# Patient Record
Sex: FEMALE | Race: WHITE | NOT HISPANIC OR LATINO | Employment: FULL TIME | URBAN - METROPOLITAN AREA
[De-identification: names, ages, dates, MRNs, and addresses within clinical notes are randomized per-mention and may not be internally consistent; named-entity substitution may affect disease eponyms.]

---

## 2017-07-15 ENCOUNTER — OFFICE VISIT (OUTPATIENT)
Dept: URGENT CARE | Age: 47
End: 2017-07-15
Payer: COMMERCIAL

## 2017-07-15 PROCEDURE — 99203 OFFICE O/P NEW LOW 30 MIN: CPT | Performed by: FAMILY MEDICINE

## 2017-07-27 ENCOUNTER — GENERIC CONVERSION - ENCOUNTER (OUTPATIENT)
Dept: OTHER | Facility: OTHER | Age: 47
End: 2017-07-27

## 2017-10-18 ENCOUNTER — ALLSCRIPTS OFFICE VISIT (OUTPATIENT)
Dept: OTHER | Facility: OTHER | Age: 47
End: 2017-10-18

## 2017-10-18 DIAGNOSIS — D64.9 ANEMIA: ICD-10-CM

## 2017-10-18 DIAGNOSIS — G43.019 INTRACTABLE MIGRAINE WITHOUT AURA AND WITHOUT STATUS MIGRAINOSUS: ICD-10-CM

## 2017-10-18 DIAGNOSIS — Z13.220 ENCOUNTER FOR SCREENING FOR LIPOID DISORDERS: ICD-10-CM

## 2017-10-18 DIAGNOSIS — R53.83 OTHER FATIGUE: ICD-10-CM

## 2017-10-18 DIAGNOSIS — H93.13 TINNITUS OF BOTH EARS: ICD-10-CM

## 2017-10-19 ENCOUNTER — TRANSCRIBE ORDERS (OUTPATIENT)
Dept: ADMINISTRATIVE | Facility: HOSPITAL | Age: 47
End: 2017-10-19

## 2017-10-19 DIAGNOSIS — H93.13 TINNITUS OF BOTH EARS: Primary | ICD-10-CM

## 2017-10-20 ENCOUNTER — GENERIC CONVERSION - ENCOUNTER (OUTPATIENT)
Dept: OTHER | Facility: OTHER | Age: 47
End: 2017-10-20

## 2017-10-20 ENCOUNTER — APPOINTMENT (OUTPATIENT)
Dept: LAB | Facility: CLINIC | Age: 47
End: 2017-10-20
Payer: COMMERCIAL

## 2017-10-20 DIAGNOSIS — G43.019 INTRACTABLE MIGRAINE WITHOUT AURA AND WITHOUT STATUS MIGRAINOSUS: ICD-10-CM

## 2017-10-20 DIAGNOSIS — H93.13 TINNITUS OF BOTH EARS: ICD-10-CM

## 2017-10-20 DIAGNOSIS — R53.83 OTHER FATIGUE: ICD-10-CM

## 2017-10-20 DIAGNOSIS — Z13.220 ENCOUNTER FOR SCREENING FOR LIPOID DISORDERS: ICD-10-CM

## 2017-10-20 LAB
ALBUMIN SERPL BCP-MCNC: 3.4 G/DL (ref 3.5–5)
ALP SERPL-CCNC: 67 U/L (ref 46–116)
ALT SERPL W P-5'-P-CCNC: 19 U/L (ref 12–78)
ANION GAP SERPL CALCULATED.3IONS-SCNC: 6 MMOL/L (ref 4–13)
AST SERPL W P-5'-P-CCNC: 11 U/L (ref 5–45)
BASOPHILS # BLD AUTO: 0.02 THOUSANDS/ΜL (ref 0–0.1)
BASOPHILS NFR BLD AUTO: 0 % (ref 0–1)
BILIRUB SERPL-MCNC: 0.27 MG/DL (ref 0.2–1)
BUN SERPL-MCNC: 13 MG/DL (ref 5–25)
CALCIUM SERPL-MCNC: 9.8 MG/DL (ref 8.3–10.1)
CHLORIDE SERPL-SCNC: 106 MMOL/L (ref 100–108)
CHOLEST SERPL-MCNC: 162 MG/DL (ref 50–200)
CO2 SERPL-SCNC: 27 MMOL/L (ref 21–32)
CREAT SERPL-MCNC: 0.66 MG/DL (ref 0.6–1.3)
EOSINOPHIL # BLD AUTO: 0.16 THOUSAND/ΜL (ref 0–0.61)
EOSINOPHIL NFR BLD AUTO: 3 % (ref 0–6)
ERYTHROCYTE [DISTWIDTH] IN BLOOD BY AUTOMATED COUNT: 13.8 % (ref 11.6–15.1)
GFR SERPL CREATININE-BSD FRML MDRD: 105 ML/MIN/1.73SQ M
GLUCOSE P FAST SERPL-MCNC: 96 MG/DL (ref 65–99)
HCT VFR BLD AUTO: 31.9 % (ref 34.8–46.1)
HDLC SERPL-MCNC: 47 MG/DL (ref 40–60)
HGB BLD-MCNC: 9.7 G/DL (ref 11.5–15.4)
LDLC SERPL CALC-MCNC: 77 MG/DL (ref 0–100)
LYMPHOCYTES # BLD AUTO: 1.81 THOUSANDS/ΜL (ref 0.6–4.47)
LYMPHOCYTES NFR BLD AUTO: 37 % (ref 14–44)
MCH RBC QN AUTO: 26.8 PG (ref 26.8–34.3)
MCHC RBC AUTO-ENTMCNC: 30.4 G/DL (ref 31.4–37.4)
MCV RBC AUTO: 88 FL (ref 82–98)
MONOCYTES # BLD AUTO: 0.31 THOUSAND/ΜL (ref 0.17–1.22)
MONOCYTES NFR BLD AUTO: 6 % (ref 4–12)
NEUTROPHILS # BLD AUTO: 2.57 THOUSANDS/ΜL (ref 1.85–7.62)
NEUTS SEG NFR BLD AUTO: 54 % (ref 43–75)
NRBC BLD AUTO-RTO: 0 /100 WBCS
PLATELET # BLD AUTO: 266 THOUSANDS/UL (ref 149–390)
PMV BLD AUTO: 10.5 FL (ref 8.9–12.7)
POTASSIUM SERPL-SCNC: 4.3 MMOL/L (ref 3.5–5.3)
PROT SERPL-MCNC: 7.1 G/DL (ref 6.4–8.2)
RBC # BLD AUTO: 3.62 MILLION/UL (ref 3.81–5.12)
SODIUM SERPL-SCNC: 139 MMOL/L (ref 136–145)
TRIGL SERPL-MCNC: 191 MG/DL
TSH SERPL DL<=0.05 MIU/L-ACNC: 2.48 UIU/ML (ref 0.36–3.74)
WBC # BLD AUTO: 4.88 THOUSAND/UL (ref 4.31–10.16)

## 2017-10-20 PROCEDURE — 36415 COLL VENOUS BLD VENIPUNCTURE: CPT

## 2017-10-20 PROCEDURE — 80061 LIPID PANEL: CPT

## 2017-10-20 PROCEDURE — 85025 COMPLETE CBC W/AUTO DIFF WBC: CPT

## 2017-10-20 PROCEDURE — 80053 COMPREHEN METABOLIC PANEL: CPT

## 2017-10-20 PROCEDURE — 84443 ASSAY THYROID STIM HORMONE: CPT

## 2017-11-30 ENCOUNTER — ALLSCRIPTS OFFICE VISIT (OUTPATIENT)
Dept: OTHER | Facility: OTHER | Age: 47
End: 2017-11-30

## 2017-12-05 NOTE — PROGRESS NOTES
Assessment    1  Acute maxillary sinusitis (461 0) (J01 00)    Plan  Acute maxillary sinusitis    · Azithromycin 250 MG Oral Tablet; TAKE 2 TABLETS ON DAY 1 THEN TAKE 1  TABLET A DAY FOR 4 DAYS    Discussion/Summary  Possible side effects of new medications were reviewed with the patient/guardian today  The treatment plan was reviewed with the patient/guardian  The patient/guardian understands and agrees with the treatment plan      Chief Complaint  PT here for Sore Throat, cough, mucus, body aches and fatigue      History of Present Illness  Cold Symptoms: Calton Ahumada presents with complaints of cold symptoms  Associated symptoms include nasal congestion, runny nose, post nasal drainage, sore throat, dry cough, facial pressure and fever, but no sneezing, no scratchy throat, no hoarseness, no productive cough, no facial pain, no headache, no plugged ear(s), no ear pain, no swollen lymph nodes, no wheezing, no shortness of breath, no fatigue, no weakness, no nausea, no vomiting, no diarrhea and no chills  Review of Systems    Constitutional: fever, but no chills  ENT: as noted in HPI  Cardiovascular: no complaints of slow or fast heart rate, no chest pain, no palpitations, no leg claudication or lower extremity edema  Respiratory: cough, but no shortness of breath and no wheezing  Active Problems    1  Anemia (285 9) (D64 9)   2  Encounter for routine gynecological examination () (Z01 419)   3  Denied: History of self breast exam   4  Migraine without aura, intractable (346 11) (G43 019)   5  Need for lipid screening (7 91) (Z13 220)   6  Screening for HPV (human papillomavirus) ( 81) (Z11 51)   7  Tinnitus of both ears (388 30) (H93 13)   8  Uterine prolapse (618 1) (N81 4)   9  Visit for screening mammogram (6 12) (Z12 31)    Past Medical History    1  History of  3   2  Headache (784 0) (R51)   3   Denied: History of self breast exam  Active Problems And Past Medical History Reviewed: The active problems and past medical history were reviewed and updated today  Family History  Father    1  Family history of Acute Renal Failure   2  Family history of Type 2 Diabetes Mellitus  Family History Reviewed: The family history was reviewed and updated today  Social History    · Denied: History of Alcohol Use (History)   · Birth Control Not Practiced   · Caffeine Use   · Marital History - Single   · Never A Smoker   · No drug use   · Three children   · Uses Safety Equipment - Seatbelts  The social history was reviewed and updated today  The social history was reviewed and is unchanged  Surgical History    1  History of  Section   2  History of Eye Surgery  Surgical History Reviewed: The surgical history was reviewed and updated today  Current Meds   1  ALPRAZolam 0 25 MG Oral Tablet; Therapy: 31YBS3315 to Recorded   2  Ibuprofen 800 MG Oral Tablet; Therapy: 2016 to Recorded   3  Magnesium Oxide 500 MG Oral Tablet; take 1 tablet every twelve hours; Therapy: 97MOI1373 to (Evaluate:2017)  Requested for: 07AXN9938; Last   Rx:2017 Ordered    The medication list was reviewed and updated today  Allergies    1  No Known Drug Allergies    2  Seasonal    Vitals   Recorded: 88USW1914 02:27PM   Temperature 100 1 F   Heart Rate 104   Respiration 16   Systolic 259   Diastolic 72   Height 5 ft 4 in   Weight 154 lb 4 oz   BMI Calculated 26 48   BSA Calculated 1 75     Physical Exam    Constitutional   General appearance: No acute distress, well appearing and well nourished  Eyes   Conjunctiva and lids: No swelling, erythema or discharge  Pupils and irises: Equal, round and reactive to light  Ears, Nose, Mouth, and Throat   External inspection of ears and nose: Normal     Otoscopic examination: Tympanic membranes translucent with normal light reflex  Canals patent without erythema      Nasal mucosa, septum, and turbinates: Abnormal  There was clear rhinorrhea from both nares  The bilateral nasal mucosa was red  Pulmonary   Respiratory effort: No increased work of breathing or signs of respiratory distress  Auscultation of lungs: Clear to auscultation  Cardiovascular   Palpation of heart: Normal PMI, no thrills  Auscultation of heart: Normal rate and rhythm, normal S1 and S2, without murmurs  Results/Data  PHQ-2 Adult Depression Screening 12QAE2410 02:29PM User, Ahs     Test Name Result Flag Reference   PHQ-2 Adult Depression Score 0     Over the last two weeks, how often have you been bothered by any of the following problems?   Little interest or pleasure in doing things: Not at all - 0  Feeling down, depressed, or hopeless: Not at all - 0   PHQ-2 Adult Depression Screening Negative         Future Appointments    Date/Time Provider Specialty Site   01/18/2018 04:45 PM Mayte De La O MD [address]   Electronically signed by : Raiford Brittle, MD; Nov 30 2017  2:43PM EST                       (Author)

## 2018-01-09 ENCOUNTER — GENERIC CONVERSION - ENCOUNTER (OUTPATIENT)
Dept: OTHER | Facility: OTHER | Age: 48
End: 2018-01-09

## 2018-01-12 NOTE — RESULT NOTES
Discussion/Summary   blood work showed mild anemia at 9 7 ( normal 11-15)   I want to order more blood work to find out the underlying cause of her anemia  blood work order in chart  I want her start iron and Vitamin C supplements     high triglycerides level- watch sweets and fatty food   normal liver and kidney function    - Dr Franco Love      Verified Results  (1) TSH WITH FT4 REFLEX 20Oct2017 08:34AM Leilani De La O    Order Number: [id number]     Test Name Result Flag Reference   TSH 2 480 uIU/mL  0 358-3 740   Patients undergoing fluorescein dye angiography may retain small amounts of fluorescein in the body for 48-72 hours post procedure  Samples containing fluorescein can produce falsely depressed TSH values  If the patient had this procedure,a specimen should be resubmitted post fluorescein clearance  The recommended reference ranges for TSH during pregnancy are as follows:  First trimester 0 1 to 2 5 uIU/mL  Second trimester  0 2 to 3 0 uIU/mL  Third trimester 0 3 to 3 0 uIU/m     (1) COMPREHENSIVE METABOLIC PANEL 09NKX6630 27:27IX [facility] Order Number: [id number]     Test Name Result Flag Reference   SODIUM 139 mmol/L  136-145   POTASSIUM 4 3 mmol/L  3 5-5 3   CHLORIDE 106 mmol/L  100-108   CARBON DIOXIDE 27 mmol/L  21-32   ANION GAP (CALC) 6 mmol/L  4-13   BLOOD UREA NITROGEN 13 mg/dL  5-25   CREATININE 0 66 mg/dL  0 60-1 30   Standardized to IDMS reference method   CALCIUM 9 8 mg/dL  8 3-10 1   BILI, TOTAL 0 27 mg/dL  0 20-1 00   ALK PHOSPHATAS 67 U/L     ALT (SGPT) 19 U/L  12-78   Specimen collection should occur prior to Sulfasalazine and/or Sulfapyridine administration due to the potential for falsely depressed results  AST(SGOT) 11 U/L  5-45   Specimen collection should occur prior to Sulfasalazine administration due to the potential for falsely depressed results     ALBUMIN 3 4 g/dL L 3 5-5 0   TOTAL PROTEIN 7 1 g/dL  6 4-8 2   eGFR 105 ml/min/1 73sq m National Kidney Disease Education Program recommendations are as follows:  GFR calculation is accurate only with a steady state creatinine  Chronic Kidney disease less than 60 ml/min/1 73 sq  meters  Kidney failure less than 15 ml/min/1 73 sq  meters  GLUCOSE FASTING 96 mg/dL  65-99   Specimen collection should occur prior to Sulfasalazine administration due to the potential for falsely depressed results  Specimen collection should occur prior to Sulfapyridine administration due to the potential for falsely elevated results  (1) CBC/PLT/DIFF 20Oct2017 08:34AM Masha De La O Order Number: [id number]     Test Name Result Flag Reference   WBC COUNT 4 88 Thousand/uL  4 31-10 16   RBC COUNT 3 62 Million/uL L 3 81-5 12   HEMOGLOBIN 9 7 g/dL L 11 5-15 4   HEMATOCRIT 31 9 % L 34 8-46  1   MCV 88 fL  82-98   MCH 26 8 pg  26 8-34 3   MCHC 30 4 g/dL L 31 4-37 4   RDW 13 8 %  11 6-15 1   MPV 10 5 fL  8 9-12 7   PLATELET COUNT 584 Thousands/uL  149-390   nRBC AUTOMATED 0 /100 WBCs     NEUTROPHILS RELATIVE PERCENT 54 %  43-75   LYMPHOCYTES RELATIVE PERCENT 37 %  14-44   MONOCYTES RELATIVE PERCENT 6 %  4-12   EOSINOPHILS RELATIVE PERCENT 3 %  0-6   BASOPHILS RELATIVE PERCENT 0 %  0-1   NEUTROPHILS ABSOLUTE COUNT 2 57 Thousands/? ??L  1 85-7 62   LYMPHOCYTES ABSOLUTE COUNT 1 81 Thousands/? ??L  0 60-4 47   MONOCYTES ABSOLUTE COUNT 0 31 Thousand/? ??L  0 17-1 22   EOSINOPHILS ABSOLUTE COUNT 0 16 Thousand/? ??L  0 00-0 61   BASOPHILS ABSOLUTE COUNT 0 02 Thousands/? ??L  0 00-0 10     (1) LIPID PANEL, FASTING 12UNF1883 08:34AM Masha De La O Order Number: [id number]     Test Name Result Flag Reference   CHOLESTEROL 162 mg/dL     HDL,DIRECT 47 mg/dL  40-60   Specimen collection should occur prior to Metamizole administration due to the potential for falsley depressed results     LDL CHOLESTEROL CALCULATED 77 mg/dL  0-100   Triglyceride:        Normal <150 mg/dl   Borderline High 150-199 mg/dl   High 200-499 mg/dl   Very High >499 mg/dl      Cholesterol:       Desirable <200 mg/dl    Borderline High 200-239 mg/dl    High >239 mg/dl      HDL Cholesterol:       High>59 mg/dL    Low <41 mg/dL      This screening LDL is a calculated result  It does not have the accuracy of the Direct Measured LDL in the monitoring of patients with hyperlipidemia and/or statin therapy  Direct Measure LDL (DTY008) must be ordered separately in these patients  TRIGLYCERIDES 191 mg/dL H <=150   Specimen collection should occur prior to N-Acetylcysteine or Metamizole administration due to the potential for falsely depressed results  Plan  Anemia    · (1) FERRITIN; Status:Active; Requested HVK:91CQT5131;    · (1) IRON; Status:Active; Requested DXY:25BRX0965;    · (1) METHYLMALONIC ACID,BLOOD; Status:Active; Requested for:20Oct2017;    · (1) VITAMIN B12; Status:Active;  Requested SAP:[id number];     Signatures   Electronically signed by : Kuldip Mueller MD; Oct 20 2017  2:49PM EST                       (Author)

## 2018-01-12 NOTE — PROGRESS NOTES
Assessment    1  Encounter for preventive health examination (V70 0) (Z00 00)   2  Tinnitus of both ears (388 30) (H93 13)   3  Migraine without aura, intractable (346 11) (G43 019)   4  Fatigue (780 79) (R53 83)   5  Need for lipid screening (V77 91) (Z13 220)    Plan  Fatigue    · (1) CBC/PLT/DIFF; Status:Active; Requested for:18Oct2017; Health Maintenance    · Always use a seat belt and shoulder strap when riding or driving a motor vehicle ;  Status:Complete;   Done: 51KWI4014   · Begin or continue regular aerobic exercise  Gradually work up to at least 3 sessions of 30  minutes of exercise a week ; Status:Complete;   Done: 02ZCR4652   · Brush your teeth 3 times a day and floss at least once a day ; Status:Complete;   Done:  73XMN4453   · Drink plenty of fluids ; Status:Complete;   Done: 03OXK5955   · Eat a low fat and low cholesterol diet ; Status:Complete;   Done: 57REB9663   · Eat a normal well-balanced diet ; Status:Complete;   Done: 32WJP8627   · Eat foods that are high in calcium ; Status:Complete;   Done: 62GFF0963   · Some eating tips that can help you lose weight ; Status:Complete;   Done: 79ZDB2871   · Use a sun block product with an SPF of 15 or more ; Status:Complete;   Done:  29KCL6648   · Vitamins can help you get daily requirements that your diet may not be giving you ;  Status:Complete;   Done: 04FXC0588   · We recommend that you bring your body mass index down to 26 ; Status:Complete;    Done: 09NJV8490   · Follow-up visit in 1 year Evaluation and Treatment  Follow-up  Status: Complete  Done:  70WSS5726  Migraine without aura, intractable    · Magnesium Oxide 500 MG Oral Tablet; take 1 tablet every twelve hours   · Topiramate 50 MG Oral Tablet (Topamax); 1/2 tab every night for 1 week then 1 tab  QHS  Migraine without aura, intractable, Tinnitus of both ears    · (1) COMPREHENSIVE METABOLIC PANEL; Status:Active; Requested for:18Oct2017;    · (1) TSH WITH FT4 REFLEX; Status:Active;  Requested AXV:27KID1122;    · * MRI BRAIN W WO CONTRAST; Status:Need Information - Financial Authorization; Requested for:18Oct2017;   Need for lipid screening    · (1) LIPID PANEL, FASTING; Status:Active; Requested for:18Oct2017;     Discussion/Summary  health maintenance visit Currently, she eats an adequate diet  the risks and benefits of cervical cancer screening were discussed Breast cancer screening: the risks and benefits of breast cancer screening were discussed and breast cancer screening is not indicated  Colorectal cancer screening: the risks and benefits of colorectal cancer screening were discussed and colorectal cancer screening is not indicated  Screening lab work includes hemoglobin, glucose and lipid profile  The patient declines immunizations  She was advised to be evaluated by an ophthalmologist and a dentist  Advice and education were given regarding nutrition, contraception, self skin examination, helmet use and seat belt use  Chief Complaint  Pt here for New PCP and Physical      History of Present Illness  HM, Adult Female: The patient is being seen for a health maintenance evaluation  The last health maintenance visit was 1 year(s) ago  General Health: The patient's health since the last visit is described as good  She has regular dental visits  She denies vision problems  She denies hearing loss  Immunizations status: not up to date  Lifestyle:  She consumes a diverse and healthy diet  She does not have any weight concerns  She exercises regularly  She does not use tobacco  She denies alcohol use  She denies drug use  Reproductive health:  she reports normal menses  she uses no contraception  she is not sexually active  pregnancy history: G 3P 3, 3  Screening: cancer screening reviewed and updated  metabolic screening reviewed and updated  risk screening reviewed and updated     HPI: tinnitus on both ears for 1 year  seen by ENT that time and all work up were normal   worsening headaches and tinnitus for the last few months   Never had MRI of the brain for evaluation   Ibuprofen is not helping with her headaches anymore       Review of Systems    Constitutional: No fever, no chills, feels well, no tiredness, no recent weight gain or weight loss  Eyes: No complaints of eye pain, no red eyes, no eyesight problems, no discharge, no dry eyes, no itching of eyes  ENT: no complaints of earache, no loss of hearing, no nose bleeds, no nasal discharge, no sore throat, no hoarseness  Cardiovascular: No complaints of slow heart rate, no fast heart rate, no chest pain, no palpitations, no leg claudication, no lower extremity edema  Respiratory: No complaints of shortness of breath, no wheezing, no cough, no SOB on exertion, no orthopnea, no PND  Gastrointestinal: No complaints of abdominal pain, no constipation, no nausea or vomiting, no diarrhea, no bloody stools  Genitourinary: No complaints of dysuria, no incontinence, no pelvic pain, no dysmenorrhea, no vaginal discharge or bleeding  Musculoskeletal: No complaints of arthralgias, no myalgias, no joint swelling or stiffness, no limb pain or swelling  Integumentary: No complaints of skin rash or lesions, no itching, no skin wounds, no breast pain or lump  Neurological: headache, but as noted in HPI, no numbness, no confusion and no convulsions  Psychiatric: Not suicidal, no sleep disturbance, no anxiety or depression, no change in personality, no emotional problems  Endocrine: No complaints of proptosis, no hot flashes, no muscle weakness, no deepening of the voice, no feelings of weakness  Hematologic/Lymphatic: No complaints of swollen glands, no swollen glands in the neck, does not bleed easily, does not bruise easily  Active Problems    1  Encounter for routine gynecological examination (V72 31) (Z01 419)   2  Denied: History of self breast exam   3  Screening for HPV (human papillomavirus) (Y83 81) (Z11 51)   4  Uterine prolapse (618 1) (N81 4)   5  Visit for screening mammogram (V76 12) (Z12 31)    Past Medical History    · History of  3   · Headache (784 0) (R51)   · Denied: History of self breast exam    Surgical History    · History of  Section   · History of Eye Surgery    Family History  Father    · Family history of Acute Renal Failure   · Family history of Type 2 Diabetes Mellitus    Social History    · Denied: History of Alcohol Use (History)   · Birth Control Not Practiced   · Caffeine Use   · Marital History - Single   · Never A Smoker   · No drug use   · Three children   · Uses Safety Equipment - Seatbelts    Current Meds   1  ALPRAZolam 0 25 MG Oral Tablet; Therapy: 03WYV4949 to Recorded   2  Ibuprofen 800 MG Oral Tablet; Therapy: 87Hfl1032 to Recorded    Allergies    1  No Known Drug Allergies    Vitals   Recorded: 21DVJ2197 03:52PM   Heart Rate 100   Respiration 16   Systolic 156   Diastolic 72   Height 5 ft 4 65 in   Weight 149 lb 4 oz   BMI Calculated 25 11   BSA Calculated 1 74     Physical Exam    Constitutional   General appearance: No acute distress, well appearing and well nourished  Head and Face   Head and face: Normal     Palpation of the face and sinuses: No sinus tenderness  Eyes   Conjunctiva and lids: No swelling, erythema or discharge  Pupils and irises: Equal, round, reactive to light  Ophthalmoscopic examination: Normal fundi and optic discs  Ears, Nose, Mouth, and Throat   External inspection of ears and nose: Normal     Otoscopic examination: Tympanic membranes translucent with normal light reflex  Canals patent without erythema  Hearing: Normal     Nasal mucosa, septum, and turbinates: Normal without edema or erythema  Lips, teeth, and gums: Normal, good dentition  Oropharynx: Normal with no erythema, edema, exudate or lesions  Neck   Neck: Supple, symmetric, trachea midline, no masses  Thyroid: Normal, no thyromegaly      Pulmonary Respiratory effort: No increased work of breathing or signs of respiratory distress  Auscultation of lungs: Clear to auscultation  Cardiovascular   Palpation of heart: Normal PMI, no thrills  Auscultation of heart: Normal rate and rhythm, normal S1 and S2, no murmurs  Carotid pulses: 2+ bilaterally  Examination of extremities for edema and/or varicosities: Normal     Chest   Chest: Normal     Abdomen   Abdomen: Non-tender, no masses  Liver and spleen: No hepatomegaly or splenomegaly  Examination for hernias: No hernia appreciated  Lymphatic   Palpation of lymph nodes in neck: No lymphadenopathy  Palpation of lymph nodes in axillae: No lymphadenopathy  Palpation of lymph nodes in groin: No lymphadenopathy  Musculoskeletal   Gait and station: Normal     Digits and nails: Normal without clubbing or cyanosis  Joints, bones, and muscles: Normal     Range of motion: Normal     Stability: Normal     Muscle strength/tone: Normal     Skin   Skin and subcutaneous tissue: Normal without rashes or lesions  Palpation of skin and subcutaneous tissue: Normal turgor  Neurologic   Cranial nerves: Cranial nerves II-XII intact  Cortical function: Normal mental status  Reflexes: 2+ and symmetric  Sensation: No sensory loss  Coordination: Normal finger to nose and heel to shin  Psychiatric   Judgment and insight: Normal     Orientation to person, place, and time: Normal     Recent and remote memory: Intact      Mood and affect: Normal        Future Appointments    Date/Time Provider Specialty Site   01/18/2018 04:45 PM Selena De La O MD [place] [address]   Electronically signed by : Lin Harvey MD; Oct 18 2017  5:03PM EST                       (Author)

## 2018-01-13 VITALS
HEART RATE: 100 BPM | HEIGHT: 65 IN | SYSTOLIC BLOOD PRESSURE: 118 MMHG | WEIGHT: 149.25 LBS | DIASTOLIC BLOOD PRESSURE: 72 MMHG | BODY MASS INDEX: 24.87 KG/M2 | RESPIRATION RATE: 16 BRPM

## 2018-01-14 VITALS
RESPIRATION RATE: 16 BRPM | WEIGHT: 154.25 LBS | HEART RATE: 104 BPM | BODY MASS INDEX: 26.34 KG/M2 | SYSTOLIC BLOOD PRESSURE: 118 MMHG | DIASTOLIC BLOOD PRESSURE: 72 MMHG | TEMPERATURE: 100.1 F | HEIGHT: 64 IN

## 2018-01-14 NOTE — MISCELLANEOUS
Message   Date: 27 Jul 2017 11:09 AM EST, Recorded By: Jose C Russ For: [place]: Nigel Runner, [place]   Phone: [phone] [facility]), [phone] (Work)   Reason: Medical Complaint   pt is having breast pain    pt will schedule apt for evaluation           Active Problems    1  Encounter for routine gynecological examination (V72 31) (Z01 419)   2  Headache (784 0) (R51)   3  Denied: History of self breast exam   4  Lightheadedness (780 4) (R42)   5  Screening for HPV (human papillomavirus) (V73 81) (Z11 51)   6  Tinnitus of left ear (388 30) (H93 12)   7  Uterine prolapse (618 1) (N81 4)   8  Visit for screening mammogram (V76 12) (Z12 31)    Current Meds   1  ALPRAZolam 0 25 MG Oral Tablet; Therapy: 88ZWQ5352 to Recorded   2  Ibuprofen 800 MG Oral Tablet; Therapy: 53Yrj9267 to Recorded    Allergies    1   No Known Drug Allergies    Signatures   Electronically signed by : Tommie Rodriguez, ; Jul 27 2017 11:10AM EST                       (Author)

## 2018-01-18 ENCOUNTER — GENERIC CONVERSION - ENCOUNTER (OUTPATIENT)
Dept: OTHER | Facility: OTHER | Age: 48
End: 2018-01-18

## 2018-01-18 DIAGNOSIS — D64.9 ANEMIA: ICD-10-CM

## 2018-01-24 VITALS
SYSTOLIC BLOOD PRESSURE: 136 MMHG | HEIGHT: 64 IN | HEART RATE: 72 BPM | DIASTOLIC BLOOD PRESSURE: 72 MMHG | WEIGHT: 153.13 LBS | BODY MASS INDEX: 26.14 KG/M2 | RESPIRATION RATE: 16 BRPM

## 2018-01-24 VITALS
WEIGHT: 152 LBS | RESPIRATION RATE: 16 BRPM | SYSTOLIC BLOOD PRESSURE: 142 MMHG | HEART RATE: 84 BPM | BODY MASS INDEX: 25.95 KG/M2 | DIASTOLIC BLOOD PRESSURE: 74 MMHG | TEMPERATURE: 97.9 F | HEIGHT: 64 IN

## 2018-03-19 ENCOUNTER — OFFICE VISIT (OUTPATIENT)
Dept: FAMILY MEDICINE CLINIC | Facility: CLINIC | Age: 48
End: 2018-03-19
Payer: COMMERCIAL

## 2018-03-19 VITALS
SYSTOLIC BLOOD PRESSURE: 112 MMHG | WEIGHT: 156.4 LBS | DIASTOLIC BLOOD PRESSURE: 72 MMHG | HEART RATE: 76 BPM | RESPIRATION RATE: 16 BRPM | BODY MASS INDEX: 26.85 KG/M2

## 2018-03-19 DIAGNOSIS — G43.019 MIGRAINE WITHOUT AURA, INTRACTABLE: Primary | ICD-10-CM

## 2018-03-19 DIAGNOSIS — H93.13 TINNITUS OF BOTH EARS: ICD-10-CM

## 2018-03-19 DIAGNOSIS — F51.01 PRIMARY INSOMNIA: ICD-10-CM

## 2018-03-19 PROCEDURE — 99214 OFFICE O/P EST MOD 30 MIN: CPT | Performed by: FAMILY MEDICINE

## 2018-03-19 RX ORDER — THIAMINE HCL 100 MG
1 TABLET ORAL
COMMUNITY
Start: 2017-10-18 | End: 2018-09-20

## 2018-03-19 RX ORDER — ALPRAZOLAM 0.25 MG/1
0.25 TABLET ORAL
Qty: 30 TABLET | Refills: 0 | Status: SHIPPED | OUTPATIENT
Start: 2018-03-19 | End: 2018-09-10 | Stop reason: SDUPTHER

## 2018-03-19 RX ORDER — RIZATRIPTAN BENZOATE 10 MG/1
10 TABLET, ORALLY DISINTEGRATING ORAL ONCE AS NEEDED
Qty: 9 TABLET | Refills: 0 | Status: SHIPPED | OUTPATIENT
Start: 2018-03-19 | End: 2018-05-30 | Stop reason: SDUPTHER

## 2018-03-19 RX ORDER — ALPRAZOLAM 0.25 MG/1
TABLET ORAL
COMMUNITY
Start: 2016-03-30 | End: 2018-03-19 | Stop reason: SDUPTHER

## 2018-03-19 NOTE — PROGRESS NOTES
Assessment/Plan:         Diagnoses and all orders for this visit:    Migraine without aura, intractable  -     rizatriptan (MAXALT-MLT) 10 MG disintegrating tablet; Take 1 tablet (10 mg total) by mouth once as needed for migraine for up to 1 dose May repeat in 2 hours if needed    Primary insomnia  -     ALPRAZolam (XANAX) 0 25 mg tablet; Take 1 tablet (0 25 mg total) by mouth daily at bedtime as needed for anxiety    Tinnitus of both ears  -     Ambulatory Referral to Otolaryngology    Other orders  -     Discontinue: ALPRAZolam (XANAX) 0 25 mg tablet; Take by mouth  -     Magnesium 500 MG TABS; Take 1 tablet by mouth      restart her magnesium daily  Spent 25 minutes with the patient and more than 50% was spent counseling   Subjective:      Patient ID: Tacho Styles is a 51 y o  female  Migraine    This is a new problem  The current episode started in the past 7 days  The problem has been waxing and waning  The pain is located in the bilateral region  The pain quality is similar to prior headaches  The quality of the pain is described as aching  The pain is at a severity of 3/10  The pain is mild  Associated symptoms include insomnia, phonophobia, photophobia and tinnitus  Pertinent negatives include no abdominal pain, abnormal behavior, anorexia, back pain, blurred vision, coughing, dizziness, drainage, ear pain, eye pain, eye redness, eye watering, facial sweating, fever, hearing loss, loss of balance, muscle aches, nausea, neck pain, numbness, rhinorrhea, scalp tenderness, seizures, sinus pressure, sore throat, swollen glands, tingling, visual change, vomiting, weakness or weight loss  The symptoms are aggravated by fatigue and noise  She has tried cold packs and Excedrin for the symptoms  The treatment provided mild relief  Her past medical history is significant for migraine headaches   There is no history of cancer, cluster headaches, hypertension, immunosuppression, migraines in the family, obesity, pseudotumor cerebri, recent head traumas, sinus disease or TMJ  Still having ringing in both ears on and off and hasn't seen an ENT yet    Trouble falling asleep at night due to stress  No recent stressors but her minds wont shut off at night  No suicidal or homicidal ideations      The following portions of the patient's history were reviewed and updated as appropriate: allergies, current medications, past family history, past medical history, past social history, past surgical history and problem list     Review of Systems   Constitutional: Negative for fever and weight loss  HENT: Positive for tinnitus  Negative for ear pain, hearing loss, rhinorrhea, sinus pressure and sore throat  Eyes: Positive for photophobia  Negative for blurred vision, pain and redness  Respiratory: Negative for cough  Gastrointestinal: Negative for abdominal pain, anorexia, nausea and vomiting  Musculoskeletal: Negative for back pain and neck pain  Neurological: Negative for dizziness, tingling, seizures, weakness, numbness and loss of balance  Psychiatric/Behavioral: The patient has insomnia  No past medical history on file  Past Surgical History:   Procedure Laterality Date     SECTION      EYE SURGERY       Social History     Social History    Marital status: Single     Spouse name: N/A    Number of children: 3    Years of education: N/A     Occupational History    Not on file       Social History Main Topics    Smoking status: Never Smoker    Smokeless tobacco: Never Used    Alcohol use No    Drug use: No    Sexual activity: Not on file      Comment: birth control not practiced     Other Topics Concern    Not on file     Social History Narrative    Caffeine use    Uses safety equipment seatbelts     Allergies   Allergen Reactions    Seasonal Ic  [Cholestatin]          Family History   Problem Relation Age of Onset    Kidney failure Father      acute per allscripts    Diabetes type II Father            Current Outpatient Prescriptions:     ALPRAZolam (XANAX) 0 25 mg tablet, Take 1 tablet (0 25 mg total) by mouth daily at bedtime as needed for anxiety, Disp: 30 tablet, Rfl: 0    Magnesium 500 MG TABS, Take 1 tablet by mouth, Disp: , Rfl:     rizatriptan (MAXALT-MLT) 10 MG disintegrating tablet, Take 1 tablet (10 mg total) by mouth once as needed for migraine for up to 1 dose May repeat in 2 hours if needed, Disp: 9 tablet, Rfl: 0    Objective:      /72   Pulse 76   Resp 16   Wt 70 9 kg (156 lb 6 4 oz)   BMI 26 85 kg/m²        Physical Exam   Constitutional: She is oriented to person, place, and time  She appears well-developed and well-nourished  HENT:   Head: Normocephalic  Eyes: Conjunctivae are normal  Pupils are equal, round, and reactive to light  Neck: Normal range of motion  Neck supple  Cardiovascular: Normal rate and regular rhythm  Pulmonary/Chest: Effort normal and breath sounds normal    Musculoskeletal: Normal range of motion  Neurological: She is alert and oriented to person, place, and time  Skin: Skin is warm  Psychiatric: She has a normal mood and affect   Her behavior is normal

## 2018-05-09 ENCOUNTER — OFFICE VISIT (OUTPATIENT)
Dept: FAMILY MEDICINE CLINIC | Facility: CLINIC | Age: 48
End: 2018-05-09
Payer: COMMERCIAL

## 2018-05-09 VITALS
WEIGHT: 155.8 LBS | BODY MASS INDEX: 26.74 KG/M2 | DIASTOLIC BLOOD PRESSURE: 64 MMHG | RESPIRATION RATE: 18 BRPM | HEART RATE: 64 BPM | OXYGEN SATURATION: 97 % | SYSTOLIC BLOOD PRESSURE: 108 MMHG

## 2018-05-09 DIAGNOSIS — R10.11 RIGHT UPPER QUADRANT ABDOMINAL PAIN: Primary | ICD-10-CM

## 2018-05-09 DIAGNOSIS — K59.04 CHRONIC IDIOPATHIC CONSTIPATION: ICD-10-CM

## 2018-05-09 PROCEDURE — 99213 OFFICE O/P EST LOW 20 MIN: CPT | Performed by: FAMILY MEDICINE

## 2018-05-09 RX ORDER — POLYETHYLENE GLYCOL 3350 17 G/17G
17 POWDER, FOR SOLUTION ORAL DAILY
Qty: 14 EACH | Refills: 0 | Status: SHIPPED | OUTPATIENT
Start: 2018-05-09 | End: 2018-09-20

## 2018-05-09 NOTE — PROGRESS NOTES
Assessment/Plan:         Diagnoses and all orders for this visit:    Right upper quadrant abdominal pain  -     US abdomen complete; Future    Chronic idiopathic constipation  -     polyethylene glycol (MIRALAX) 17 g packet; Take 17 g by mouth daily     take probiotics daily to help bloating  To r/o gallbladder disease        Subjective:      Patient ID: Sondra Pagan is a 51 y o  female  Abdominal Pain   This is a recurrent problem  The current episode started more than 1 month ago  The onset quality is gradual  The problem occurs intermittently  The problem has been waxing and waning  The pain is located in the RUQ  The pain is at a severity of 2/10  The pain is mild  The quality of the pain is dull  The abdominal pain does not radiate  Associated symptoms include constipation and flatus  Pertinent negatives include no anorexia, arthralgias, belching, diarrhea, dysuria, fever, frequency, headaches, hematochezia, hematuria, melena, myalgias, nausea, vomiting or weight loss  Nothing aggravates the pain  The pain is relieved by nothing  She has tried nothing for the symptoms  The treatment provided no relief  There is no history of abdominal surgery, colon cancer, Crohn's disease, gallstones, GERD, irritable bowel syndrome, pancreatitis, PUD or ulcerative colitis  The following portions of the patient's history were reviewed and updated as appropriate: allergies, current medications, past family history, past medical history, past social history, past surgical history and problem list     Review of Systems   Constitutional: Negative for fever and weight loss  Gastrointestinal: Positive for abdominal pain, constipation and flatus  Negative for anorexia, diarrhea, hematochezia, melena, nausea and vomiting  Genitourinary: Negative for dysuria, frequency and hematuria  Musculoskeletal: Negative for arthralgias and myalgias  Neurological: Negative for headaches               No past medical history on file   Past Surgical History:   Procedure Laterality Date     SECTION      EYE SURGERY       Social History     Social History    Marital status: Single     Spouse name: N/A    Number of children: 3    Years of education: N/A     Occupational History    Not on file  Social History Main Topics    Smoking status: Never Smoker    Smokeless tobacco: Never Used    Alcohol use No    Drug use: No    Sexual activity: Not on file      Comment: birth control not practiced     Other Topics Concern    Not on file     Social History Narrative    Caffeine use    Uses safety equipment seatbelts     Allergies   Allergen Reactions    Seasonal Ic  [Cholestatin]          Family History   Problem Relation Age of Onset    Kidney failure Father      acute per allscripts    Diabetes type II Father            Current Outpatient Prescriptions:     ALPRAZolam (XANAX) 0 25 mg tablet, Take 1 tablet (0 25 mg total) by mouth daily at bedtime as needed for anxiety, Disp: 30 tablet, Rfl: 0    Magnesium 500 MG TABS, Take 1 tablet by mouth, Disp: , Rfl:     polyethylene glycol (MIRALAX) 17 g packet, Take 17 g by mouth daily, Disp: 14 each, Rfl: 0    rizatriptan (MAXALT-MLT) 10 MG disintegrating tablet, Take 1 tablet (10 mg total) by mouth once as needed for migraine for up to 1 dose May repeat in 2 hours if needed, Disp: 9 tablet, Rfl: 0      Objective:      /64   Pulse 64   Resp 18   Wt 70 7 kg (155 lb 12 8 oz)   SpO2 97%   Breastfeeding? No   BMI 26 74 kg/m²          Physical Exam   Constitutional: She appears well-developed and well-nourished  HENT:   Head: Normocephalic and atraumatic  Eyes: EOM are normal  Pupils are equal, round, and reactive to light  Cardiovascular: Normal rate and regular rhythm  Pulmonary/Chest: Effort normal and breath sounds normal    Abdominal: Bowel sounds are normal  She exhibits no distension  There is tenderness

## 2018-05-16 ENCOUNTER — HOSPITAL ENCOUNTER (OUTPATIENT)
Dept: ULTRASOUND IMAGING | Facility: HOSPITAL | Age: 48
Discharge: HOME | End: 2018-05-16
Payer: COMMERCIAL

## 2018-05-16 DIAGNOSIS — K86.9 LESION OF PANCREAS: ICD-10-CM

## 2018-05-16 DIAGNOSIS — R10.13 EPIGASTRIC PAIN: Primary | ICD-10-CM

## 2018-05-16 DIAGNOSIS — R10.11 RIGHT UPPER QUADRANT ABDOMINAL PAIN: ICD-10-CM

## 2018-05-16 PROCEDURE — 76700 US EXAM ABDOM COMPLETE: CPT

## 2018-05-25 ENCOUNTER — APPOINTMENT (OUTPATIENT)
Dept: LAB | Facility: CLINIC | Age: 48
End: 2018-05-25
Payer: COMMERCIAL

## 2018-05-25 LAB
ALBUMIN SERPL BCP-MCNC: 3.5 G/DL (ref 3.5–5)
ALP SERPL-CCNC: 64 U/L (ref 46–116)
ALT SERPL W P-5'-P-CCNC: 20 U/L (ref 12–78)
AMYLASE SERPL-CCNC: 57 IU/L (ref 25–115)
ANION GAP SERPL CALCULATED.3IONS-SCNC: 6 MMOL/L (ref 4–13)
AST SERPL W P-5'-P-CCNC: 11 U/L (ref 5–45)
BASOPHILS # BLD AUTO: 0.02 THOUSANDS/ΜL (ref 0–0.1)
BASOPHILS NFR BLD AUTO: 0 % (ref 0–1)
BILIRUB SERPL-MCNC: 0.19 MG/DL (ref 0.2–1)
BUN SERPL-MCNC: 15 MG/DL (ref 5–25)
CALCIUM SERPL-MCNC: 10 MG/DL (ref 8.3–10.1)
CHLORIDE SERPL-SCNC: 110 MMOL/L (ref 100–108)
CO2 SERPL-SCNC: 26 MMOL/L (ref 21–32)
CREAT SERPL-MCNC: 0.65 MG/DL (ref 0.6–1.3)
EOSINOPHIL # BLD AUTO: 0.08 THOUSAND/ΜL (ref 0–0.61)
EOSINOPHIL NFR BLD AUTO: 1 % (ref 0–6)
ERYTHROCYTE [DISTWIDTH] IN BLOOD BY AUTOMATED COUNT: 14.4 % (ref 11.6–15.1)
GFR SERPL CREATININE-BSD FRML MDRD: 105 ML/MIN/1.73SQ M
GLUCOSE P FAST SERPL-MCNC: 96 MG/DL (ref 65–99)
HCT VFR BLD AUTO: 39.6 % (ref 34.8–46.1)
HGB BLD-MCNC: 12.2 G/DL (ref 11.5–15.4)
LIPASE SERPL-CCNC: 174 U/L (ref 73–393)
LYMPHOCYTES # BLD AUTO: 1.91 THOUSANDS/ΜL (ref 0.6–4.47)
LYMPHOCYTES NFR BLD AUTO: 31 % (ref 14–44)
MCH RBC QN AUTO: 29 PG (ref 26.8–34.3)
MCHC RBC AUTO-ENTMCNC: 30.8 G/DL (ref 31.4–37.4)
MCV RBC AUTO: 94 FL (ref 82–98)
MONOCYTES # BLD AUTO: 0.37 THOUSAND/ΜL (ref 0.17–1.22)
MONOCYTES NFR BLD AUTO: 6 % (ref 4–12)
NEUTROPHILS # BLD AUTO: 3.76 THOUSANDS/ΜL (ref 1.85–7.62)
NEUTS SEG NFR BLD AUTO: 61 % (ref 43–75)
NRBC BLD AUTO-RTO: 0 /100 WBCS
PLATELET # BLD AUTO: 266 THOUSANDS/UL (ref 149–390)
PMV BLD AUTO: 10.7 FL (ref 8.9–12.7)
POTASSIUM SERPL-SCNC: 4.3 MMOL/L (ref 3.5–5.3)
PROT SERPL-MCNC: 6.8 G/DL (ref 6.4–8.2)
RBC # BLD AUTO: 4.2 MILLION/UL (ref 3.81–5.12)
SODIUM SERPL-SCNC: 142 MMOL/L (ref 136–145)
WBC # BLD AUTO: 6.15 THOUSAND/UL (ref 4.31–10.16)

## 2018-05-25 PROCEDURE — 86301 IMMUNOASSAY TUMOR CA 19-9: CPT | Performed by: FAMILY MEDICINE

## 2018-05-25 PROCEDURE — 85025 COMPLETE CBC W/AUTO DIFF WBC: CPT | Performed by: FAMILY MEDICINE

## 2018-05-25 PROCEDURE — 36415 COLL VENOUS BLD VENIPUNCTURE: CPT | Performed by: FAMILY MEDICINE

## 2018-05-25 PROCEDURE — 82150 ASSAY OF AMYLASE: CPT | Performed by: FAMILY MEDICINE

## 2018-05-25 PROCEDURE — 83690 ASSAY OF LIPASE: CPT | Performed by: FAMILY MEDICINE

## 2018-05-25 PROCEDURE — 80053 COMPREHEN METABOLIC PANEL: CPT | Performed by: FAMILY MEDICINE

## 2018-05-26 LAB — CANCER AG19-9 SERPL-ACNC: 14 U/ML (ref 0–35)

## 2018-05-30 DIAGNOSIS — G43.019 MIGRAINE WITHOUT AURA, INTRACTABLE: ICD-10-CM

## 2018-05-30 RX ORDER — RIZATRIPTAN BENZOATE 10 MG/1
TABLET, ORALLY DISINTEGRATING ORAL
Qty: 9 TABLET | Refills: 0 | Status: SHIPPED | OUTPATIENT
Start: 2018-05-30 | End: 2018-09-28 | Stop reason: SDUPTHER

## 2018-06-05 ENCOUNTER — TELEPHONE (OUTPATIENT)
Dept: FAMILY MEDICINE CLINIC | Facility: CLINIC | Age: 48
End: 2018-06-05

## 2018-06-20 ENCOUNTER — HOSPITAL ENCOUNTER (OUTPATIENT)
Dept: RADIOLOGY | Facility: HOSPITAL | Age: 48
Discharge: HOME | End: 2018-06-20
Payer: COMMERCIAL

## 2018-06-20 DIAGNOSIS — K86.9 LESION OF PANCREAS: ICD-10-CM

## 2018-06-20 DIAGNOSIS — R10.13 EPIGASTRIC PAIN: ICD-10-CM

## 2018-06-20 PROCEDURE — A9585 GADOBUTROL INJECTION: HCPCS | Performed by: RADIOLOGY

## 2018-06-20 PROCEDURE — 74183 MRI ABD W/O CNTR FLWD CNTR: CPT

## 2018-06-20 RX ADMIN — GADOBUTROL 7 ML: 604.72 INJECTION INTRAVENOUS at 22:25

## 2018-06-25 ENCOUNTER — OFFICE VISIT (OUTPATIENT)
Dept: FAMILY MEDICINE CLINIC | Facility: CLINIC | Age: 48
End: 2018-06-25
Payer: COMMERCIAL

## 2018-06-25 ENCOUNTER — TELEPHONE (OUTPATIENT)
Dept: FAMILY MEDICINE CLINIC | Facility: CLINIC | Age: 48
End: 2018-06-25

## 2018-06-25 VITALS
RESPIRATION RATE: 22 BRPM | HEART RATE: 78 BPM | HEIGHT: 65 IN | WEIGHT: 151 LBS | BODY MASS INDEX: 25.16 KG/M2 | DIASTOLIC BLOOD PRESSURE: 80 MMHG | SYSTOLIC BLOOD PRESSURE: 146 MMHG

## 2018-06-25 DIAGNOSIS — N81.4 UTERINE PROLAPSE: Primary | ICD-10-CM

## 2018-06-25 DIAGNOSIS — R10.10 PAIN OF UPPER ABDOMEN: ICD-10-CM

## 2018-06-25 PROBLEM — G47.00 INSOMNIA: Status: ACTIVE | Noted: 2018-06-25

## 2018-06-25 PROCEDURE — 99213 OFFICE O/P EST LOW 20 MIN: CPT | Performed by: FAMILY MEDICINE

## 2018-06-25 RX ORDER — FEXOFENADINE HYDROCHLORIDE 60 MG/1
TABLET, FILM COATED ORAL EVERY 12 HOURS
COMMUNITY
End: 2018-07-09 | Stop reason: ALTCHOICE

## 2018-06-25 RX ORDER — MONTELUKAST SODIUM 10 MG/1
TABLET ORAL
COMMUNITY
End: 2018-07-09 | Stop reason: ALTCHOICE

## 2018-06-25 RX ORDER — IBUPROFEN 800 MG/1
TABLET ORAL
COMMUNITY
End: 2020-01-23

## 2018-06-25 RX ORDER — KETOROLAC TROMETHAMINE 4 MG/ML
SOLUTION/ DROPS OPHTHALMIC
COMMUNITY
End: 2018-07-09 | Stop reason: ALTCHOICE

## 2018-06-25 NOTE — PROGRESS NOTES
Assessment/Plan:         Diagnoses and all orders for this visit:    Uterine prolapse  -     Ambulatory referral to Obstetrics / Gynecology; Future    Pain of upper abdomen  -     Ambulatory referral to Gastroenterology    Other orders  -     fexofenadine (ALLEGRA ALLERGY) 60 MG tablet; Every 12 hours  -     ibuprofen (MOTRIN) 800 mg tablet; ibuprofen 800 mg tablet  -     montelukast (SINGULAIR) 10 mg tablet; montelukast 10 mg tablet  -     ketorolac (ACULAR) 0 4 % SOLN; ketorolac 0 4 % eye drops      Reviewed MRI abdomen in details with the patient and answered all the questions   To see GI if the pain persists  To watch her diet to avoid greasy, spicy food      Subjective:      Patient ID: Bronson mAado is a 51 y o  female  Here to f/u MRI abdomen   Epigastric pain has improved since she changed her diet  Normal MRI abdomen other than cysts in liver and kidneys  Feeling better  Hasn't seen her GYN for her uterine prolapse and she wants to get surgery done for it      Abdominal Pain   This is a recurrent problem  The current episode started more than 1 month ago  The onset quality is gradual  The problem occurs rarely  The problem has been gradually improving  The pain is located in the epigastric region and RUQ  The pain is at a severity of 0/10  The patient is experiencing no pain  The quality of the pain is sharp and colicky  The abdominal pain does not radiate  Pertinent negatives include no anorexia, arthralgias, belching, constipation, diarrhea, dysuria, fever, flatus, frequency, hematochezia, hematuria, melena, myalgias, nausea, vomiting or weight loss  Nothing aggravates the pain  The pain is relieved by nothing  She has tried nothing for the symptoms  The treatment provided moderate relief  Prior diagnostic workup includes CT scan and ultrasound  There is no history of abdominal surgery, colon cancer, Crohn's disease, gallstones, GERD, irritable bowel syndrome, pancreatitis, PUD or ulcerative colitis  The following portions of the patient's history were reviewed and updated as appropriate: allergies, current medications, past family history, past medical history, past social history, past surgical history and problem list     Review of Systems   Constitutional: Negative for fever and weight loss  Gastrointestinal: Positive for abdominal pain  Negative for anorexia, constipation, diarrhea, flatus, hematochezia, melena, nausea and vomiting  Genitourinary: Negative for dysuria, frequency and hematuria  Musculoskeletal: Negative for arthralgias and myalgias  Past Medical History:   Diagnosis Date    Known health problems: none 2018     Past Surgical History:   Procedure Laterality Date     SECTION      EYE SURGERY       Social History     Social History    Marital status: Single     Spouse name: N/A    Number of children: 3    Years of education: N/A     Occupational History    Not on file       Social History Main Topics    Smoking status: Never Smoker    Smokeless tobacco: Never Used    Alcohol use No    Drug use: No    Sexual activity: Not on file      Comment: birth control not practiced     Other Topics Concern    Not on file     Social History Narrative    Caffeine use    Uses safety equipment seatbelts     Allergies   Allergen Reactions    Seasonal Ic  [Cholestatin]     Sulfamethoxazole-Trimethoprim Rash         Family History   Problem Relation Age of Onset    Kidney failure Father         acute per allscripts    Diabetes type II Father            Current Outpatient Prescriptions:     rizatriptan (MAXALT-MLT) 10 MG disintegrating tablet, DISSOLVE 1 TABLET BY MOUTH ONCE AS NEEDED FOR MIGRAINE FOR UP TO 1 DOSE  MAY REPEAT IN 2 HOURS IF NEEDED, Disp: 9 tablet, Rfl: 0    ALPRAZolam (XANAX) 0 25 mg tablet, Take 1 tablet (0 25 mg total) by mouth daily at bedtime as needed for anxiety, Disp: 30 tablet, Rfl: 0    fexofenadine (ALLEGRA ALLERGY) 60 MG tablet, Every 12 hours, Disp: , Rfl:     ibuprofen (MOTRIN) 800 mg tablet, ibuprofen 800 mg tablet, Disp: , Rfl:     ketorolac (ACULAR) 0 4 % SOLN, ketorolac 0 4 % eye drops, Disp: , Rfl:     Magnesium 500 MG TABS, Take 1 tablet by mouth, Disp: , Rfl:     montelukast (SINGULAIR) 10 mg tablet, montelukast 10 mg tablet, Disp: , Rfl:     polyethylene glycol (MIRALAX) 17 g packet, Take 17 g by mouth daily, Disp: 14 each, Rfl: 0        Objective:      /80 (BP Location: Left arm, Patient Position: Sitting, Cuff Size: Standard)   Pulse 78   Resp 22   Ht 5' 5" (1 651 m)   Wt 68 5 kg (151 lb)   BMI 25 13 kg/m²          Physical Exam   Constitutional: She appears well-developed and well-nourished  Eyes: Pupils are equal, round, and reactive to light  Neck: Normal range of motion  Neck supple  Cardiovascular: Normal rate  Pulmonary/Chest: Effort normal and breath sounds normal    Abdominal: Soft  Bowel sounds are normal    Psychiatric: She has a normal mood and affect   Her behavior is normal

## 2018-07-09 ENCOUNTER — ANNUAL EXAM (OUTPATIENT)
Dept: OBGYN CLINIC | Facility: CLINIC | Age: 48
End: 2018-07-09
Payer: COMMERCIAL

## 2018-07-09 VITALS
SYSTOLIC BLOOD PRESSURE: 120 MMHG | HEIGHT: 64 IN | BODY MASS INDEX: 25.74 KG/M2 | DIASTOLIC BLOOD PRESSURE: 64 MMHG | WEIGHT: 150.8 LBS

## 2018-07-09 DIAGNOSIS — Z12.4 CERVICAL CANCER SCREENING: ICD-10-CM

## 2018-07-09 DIAGNOSIS — Z12.31 SCREENING MAMMOGRAM, ENCOUNTER FOR: Primary | ICD-10-CM

## 2018-07-09 DIAGNOSIS — Z12.9 SCREENING FOR CANCER: ICD-10-CM

## 2018-07-09 DIAGNOSIS — Z11.51 SCREENING FOR HUMAN PAPILLOMAVIRUS (HPV): ICD-10-CM

## 2018-07-09 DIAGNOSIS — B37.9 YEAST INFECTION: ICD-10-CM

## 2018-07-09 PROCEDURE — G0145 SCR C/V CYTO,THINLAYER,RESCR: HCPCS | Performed by: OBSTETRICS & GYNECOLOGY

## 2018-07-09 PROCEDURE — 99213 OFFICE O/P EST LOW 20 MIN: CPT | Performed by: OBSTETRICS & GYNECOLOGY

## 2018-07-09 PROCEDURE — 87624 HPV HI-RISK TYP POOLED RSLT: CPT | Performed by: OBSTETRICS & GYNECOLOGY

## 2018-07-09 RX ORDER — CLOTRIMAZOLE AND BETAMETHASONE DIPROPIONATE 10; .64 MG/G; MG/G
CREAM TOPICAL 2 TIMES DAILY
Qty: 30 G | Refills: 0 | Status: SHIPPED | OUTPATIENT
Start: 2018-07-09 | End: 2018-09-20

## 2018-07-09 NOTE — PROGRESS NOTES
A/P    1  Annual exam    Last pap was  discussed the schedulong of paps but with the history of  HPV in past and the desire for surgery pap was done today    Mammogram - 2016 normal per patient - slip given    Colonoscopy - no risk factors so to be done in  at 48 y/o    2  Procidentia    Pt has complete prolapse  Risk factors 3 pregnancy of 7 + pounds with 2  followed by C section     She is down to the introitus - progressively getting worse and now starting to      irritate her and cause itching, advised to use lubrication    Pelvic US and then schedule with Dr Leti Hayes for Surgery     3  Vaginal itching - Yeast infection external - Lotrisone cream     4   perimenopause -   One episode of break through bleeding over 4 months ago but regular cycles    Will get us and if abnormal lining for dc after prior to surgery or if the cycles  becomes abnormal       Subjective     Stacey Peguero is a 51 y o  female who presents for evaluation of a uterine prolapse  Problem started several years  Symptoms include: discomfort: mild and dryness and chafing of the cervix  Symptoms have gradually worsened  Menstrual History:  OB History     Para Term  AB Living   3 3 3     3   SAB TAB Ectopic Multiple Live Births           1      Menarche age: 12  Patient's last menstrual period was 2018 (within days)    Period Cycle (Days): 30  Period Duration (Days): 3-5  Period Pattern: Regular  Menstrual Flow: Heavy  Dysmenorrhea: None  The following portions of the patient's history were reviewed and updated as appropriate: allergies, current medications, past family history, past medical history, past social history, past surgical history and problem list     Review of Systems  A comprehensive review of systems was negative except for: Genitourinary: positive for uterine prolapse and pain and irritation     Objective    /64 (BP Location: Left arm, Patient Position: Sitting, Cuff Size: Standard)   Ht 5' 4" (1 626 m)   Wt 68 4 kg (150 lb 12 8 oz)   LMP 06/20/2018 (Within Days)   BMI 25 88 kg/m²     General: alert and oriented, in no acute distress, alert and appears stated age  Heart: normal rate   Lungs: symmetrical air entry   Abdomen: soft, non-tender, without masses or organomegaly  Vulva: normal  Vagina: normal mucosa  Cervix: prolapsed to the level of the introitus   Uterus: normal size, anteverted, non-tender  Adnexa: normal adnexa and no mass, fullness, tenderness

## 2018-07-09 NOTE — PATIENT INSTRUCTIONS
Uterine Prolapse   WHAT YOU NEED TO KNOW:   Uterine prolapse is when your uterus slips into your vagina  DISCHARGE INSTRUCTIONS:   Medicines:   · Estrogen therapy:  Estrogen may help strengthen the pelvic muscles and keep your uterine prolapse from getting worse  This may be taken as a pill, applied as a cream, or inserted into your vagina  · Take your medicine as directed  Contact your healthcare provider if you think your medicine is not helping or if you have side effects  Tell him or her if you are allergic to any medicine  Keep a list of the medicines, vitamins, and herbs you take  Include the amounts, and when and why you take them  Bring the list or the pill bottles to follow-up visits  Carry your medicine list with you in case of an emergency  Follow up with your healthcare provider or gynecologist as directed: You may need to return regularly to have your pessary checked  You may also need to see your gynecologist for possible surgery  Write down your questions so you remember to ask them during your visits  Pessary care:  A pessary is a rubber device shaped like a donut  It helps to hold your uterus in place  If your gynecologist fits you for a pessary, you will need to remove and clean it regularly  Self-care:   · Do Kegel exercises: These exercises strengthen the muscles that hold your uterus in place  They also tighten the muscles you use when you urinate or have a bowel movement  Tighten muscles in your pelvis (muscles you use to stop urinating)  Hold the muscles tight for 5 seconds, then relax for 5 seconds  Gradually work up to holding the muscles contracted for 10 seconds  Do at least 3 sets of 10 repetitions a day  · Avoid straining:  Do not lift heavy objects, stand for long periods of time, or strain to have a bowel movement  Prevent constipation by drinking plenty of liquids and eating foods high in fiber  Ask how much liquid to drink every day   High fiber foods include fresh fruits, vegetables, and whole grains  · Maintain a healthy weight:  Ask your healthcare provider if you need to lose weight and how much you need to lose  Weighing too much can put pressure on the tissues and muscles of your uterus and make your symptoms worse  Ask him to help you with a weight loss program   Contact your healthcare provider or gynecologist if:   · You are leaking urine or bowel movement  · You have a fever  · You have foul-smelling fluid coming from your vagina  · You see blood coming from your vagina that is not from your monthly period  · You have questions or concerns about your condition or care  Seek care immediately or call 911 if:   · You have bleeding from your vagina that does not stop  · You have a mass coming out of your vagina that you cannot push back in     · You are unable to urinate or have a bowel movement  · You have severe abdominal pain  © 2017 [employer] Information is for End User's use only and may not be sold, redistributed or otherwise used for commercial purposes  All illustrations and images included in CareNotes® are the copyrighted property of A [employer]  or [employer]  The above information is an  only  It is not intended as medical advice for individual conditions or treatments  Talk to your doctor, nurse or pharmacist before following any medical regimen to see if it is safe and effective for you

## 2018-07-11 ENCOUNTER — TELEPHONE (OUTPATIENT)
Dept: OBGYN CLINIC | Facility: CLINIC | Age: 48
End: 2018-07-11

## 2018-07-11 LAB — HPV RRNA GENITAL QL NAA+PROBE: NORMAL

## 2018-07-11 NOTE — PROGRESS NOTES
Please call the patient and let her know the testing was ok and normal follow up is expected   Thank you

## 2018-07-11 NOTE — TELEPHONE ENCOUNTER
----- Message from Norma Seth MD sent at 7/11/2018 12:05 PM EDT -----  Please call the patient and let her know the testing was ok and normal follow up is expected   Thank you

## 2018-07-12 ENCOUNTER — PROCEDURE VISIT (OUTPATIENT)
Dept: OBGYN CLINIC | Facility: CLINIC | Age: 48
End: 2018-07-12
Payer: COMMERCIAL

## 2018-07-12 ENCOUNTER — ULTRASOUND (OUTPATIENT)
Dept: OBGYN CLINIC | Facility: CLINIC | Age: 48
End: 2018-07-12
Payer: COMMERCIAL

## 2018-07-12 VITALS
BODY MASS INDEX: 25.44 KG/M2 | HEIGHT: 64 IN | SYSTOLIC BLOOD PRESSURE: 120 MMHG | DIASTOLIC BLOOD PRESSURE: 85 MMHG | WEIGHT: 149 LBS

## 2018-07-12 DIAGNOSIS — N81.3 COMPLETE UTEROVAGINAL PROLAPSE: Primary | ICD-10-CM

## 2018-07-12 DIAGNOSIS — R93.89 ENDOMETRIAL THICKENING ON ULTRASOUND: ICD-10-CM

## 2018-07-12 DIAGNOSIS — Z01.818 PREOPERATIVE TESTING: ICD-10-CM

## 2018-07-12 DIAGNOSIS — N81.4 UTERINE PROLAPSE: ICD-10-CM

## 2018-07-12 LAB
LAB AP GYN PRIMARY INTERPRETATION: NORMAL
Lab: NORMAL

## 2018-07-12 PROCEDURE — 58100 BIOPSY OF UTERUS LINING: CPT | Performed by: OBSTETRICS & GYNECOLOGY

## 2018-07-12 PROCEDURE — 88305 TISSUE EXAM BY PATHOLOGIST: CPT | Performed by: PATHOLOGY

## 2018-07-12 PROCEDURE — 99214 OFFICE O/P EST MOD 30 MIN: CPT | Performed by: OBSTETRICS & GYNECOLOGY

## 2018-07-12 PROCEDURE — 76830 TRANSVAGINAL US NON-OB: CPT

## 2018-07-12 NOTE — PROGRESS NOTES
AMB US Pelvic Non OB  Date/Time: 7/12/2018 1:13 PM  Performed by: Ray Nieto by: Antony Downey     Procedure details:     Technique:  Transvaginal US, Non-OB    Position: lithotomy exam    Uterine findings:     Diameter (mm):  94    Length (mm):  112    Width (mm):  91    Endometrial stripe: identified      Endometrium thickness (mm):  12 2  Left ovary findings:     Left ovary:  Visualized    Diameter (mm):  33 2    Length (mm):  49 6    Width (mm):  37  Right ovary findings:     Right ovary:  Visualized    Diameter (mm):  32 4    Length (mm):  45 9    Width (mm):  37 6  Other findings:     Free pelvic fluid: not identified      Free peritoneal fluid: not identified    Post-Procedure Details:     Impression:  Retroverted uterus demonstrates an anterior intramural fibroid 1 2cm  The endometrium is thickened and echogenic  The right ovary contains a complex cyst 2 9cm with increased peripheral blood flow on color doppler  The left ovary demonstrates an echogenic mass 2 8cm  There is normal appearing blood flow on color doppler  There is free fluid within the cul de sac  Tolerance: Tolerated well, no immediate complications    Complications: no complications    Additional Procedure Comments:      Siemens Acuson X150 EC9-4 transvaginal transducer Serial # [id number] was used to perform the examination today and subsequently followed with high level disinfection utilizing Trophon EPR procedure

## 2018-07-12 NOTE — PROGRESS NOTES
CC:  Uterus dropping    HPI: Dominick Florian presents for evaluation and possible surgery with respect to the patient feeling that her uterus is dropping  Laila Dior has been seen previously by 1 of my associates who did diagnosed uterine prolapse  The patient herself has been aware of this for many years  She has noted that is slowly worsening and currently to the point where she is very uncomfortable, and occasional have problems with defecation  She has had several vaginal deliveries, none of which had extensive tears into her rectal area or required extensive suturing  She denies any voiding issues  She has never had any vaginal surgery other than the childbearing  An ultrasound today does demonstrate a top-normal size uterus with thickened endometrium  A 2 9 cm cyst on both the right and left ovary  An endometrial sampling was performed today  A previously performed Pap smears within normal limits  Past Medical History:  Past Medical History:   Diagnosis Date    Abnormal Pap smear of cervix     HPV (human papilloma virus) infection     Known health problems: none 2018    Migraine        Past Surgical History:  Past Surgical History:   Procedure Laterality Date     SECTION      EYE SURGERY         Past OB/Gyn History:  Menstrual cycles     ALLERGIES:   Allergies   Allergen Reactions    Seasonal Ic  [Cholestatin]     Sulfamethoxazole-Trimethoprim Rash       MEDS:   Current Outpatient Prescriptions:     ALPRAZolam (XANAX) 0 25 mg tablet    clotrimazole-betamethasone (LOTRISONE) 1-0 05 % cream    ibuprofen (MOTRIN) 800 mg tablet    Magnesium 500 MG TABS    polyethylene glycol (MIRALAX) 17 g packet    rizatriptan (MAXALT-MLT) 10 MG disintegrating tablet    Review of Systems:  Skin: No rashes or discolorations of any concern  RESP: Denies SOB, no cough  CV: Denies chest pain or palpitations  Breasts: Denies masses, pain, skin changes and nipple discharge     GI: Denies abdominal pain, heartburn, nausea, vomiting, changes in bowel habits  : Denies dysuria, frequency, CVA tenderness, incontinence and hematuria  Genitalia: Denies abnormal vaginal discharge, external lesions, rashes, pelvic pain,or abnormal bleeding  Positive for pelvic and vaginal pressure  Rectal:  Denies pain, bleeding, hemorrhoids,    Physical Exam:  /85 (BP Location: Right arm, Patient Position: Sitting, Cuff Size: Standard)   Ht 5' 4" (1 626 m)   Wt 67 6 kg (149 lb)   LMP 06/20/2018 (Within Days)   BMI 25 58 kg/m²    Gen: The patient was alert and oriented x3, pleasant well-appearing female in no acute distress  Abd:  Soft, nontender, nondistended, no masses or organomegaly  Pelvic  Normal appearing external female genitalia, no visible lesions, no rashes  Upon inspection of the vagina, the cervix is practically at the level of the hymenal ring  It is large and patent, a speculum easily this places the uterus back up into the vaginal canal with a know where the cystocele visible but no posterior wall defect  Likewise there is very little deflection of the urethral area  Bimanual exam does disclose a uterus that is top-normal in size mobile and slightly tender  At this point in time an endometrial sampling is performed under aseptic technique, with the uterus sounded to 10 cm, a moderate amount of tissue being obtained  No anoperineal lesions  Skin:  No concerning lesions  Extremeties: No edema      Assessment & Plan:   1  Uterine prolapse with cystocele  I discussed with to remain the fact that she has a uterus that has dropped to the level of the hymenal ring and does have an associated bladder prolapse as well  Is recommended that she undergo a vaginal hysterectomy, anterior repair, and possibly may need a sacral spinous fixation    The latter I explained would probably not be needed since as a young patient, she should have sufficient elasticity in her uterus sacral ligaments to attach the vagina to  The patient and I reviewed the risks and benefits, pros and cons of the procedure, including alternatives  A pamphlet, going over the procedure was also given to the patient  Bella abarca had many questions which were answered to her satisfaction  She is aware that the ovaries will be conserved, we will inspect and carefully at the time of hysterectomy since they both contain small cysts  Bella abarca would like to proceed with the recommendations and was personally consented by myself  2   Endometrial thickening, await results of endometrial sampling

## 2018-07-16 ENCOUNTER — TELEPHONE (OUTPATIENT)
Dept: OBGYN CLINIC | Facility: CLINIC | Age: 48
End: 2018-07-16

## 2018-07-16 PROBLEM — N81.3 COMPLETE UTEROVAGINAL PROLAPSE: Status: ACTIVE | Noted: 2018-07-16

## 2018-07-16 NOTE — TELEPHONE ENCOUNTER
LM for patient that she should schedule her mammogram  Left central scheduling phone number for her convenience

## 2018-07-17 ENCOUNTER — TELEPHONE (OUTPATIENT)
Dept: OBGYN CLINIC | Facility: CLINIC | Age: 48
End: 2018-07-17

## 2018-07-17 NOTE — TELEPHONE ENCOUNTER
----- Message from Josie Hannon MD sent at 7/16/2018 11:19 AM EDT -----  Please inform patient her endometrial biopsy results are normal

## 2018-07-18 ENCOUNTER — TELEPHONE (OUTPATIENT)
Dept: OBGYN CLINIC | Facility: CLINIC | Age: 48
End: 2018-07-18

## 2018-07-18 DIAGNOSIS — Z12.31 VISIT FOR SCREENING MAMMOGRAM: Primary | ICD-10-CM

## 2018-07-24 ENCOUNTER — HOSPITAL ENCOUNTER (OUTPATIENT)
Dept: MAMMOGRAPHY | Facility: HOSPITAL | Age: 48
Discharge: HOME | End: 2018-07-24
Payer: COMMERCIAL

## 2018-07-24 DIAGNOSIS — Z12.31 VISIT FOR SCREENING MAMMOGRAM: ICD-10-CM

## 2018-07-24 PROCEDURE — 77067 SCR MAMMO BI INCL CAD: CPT

## 2018-07-24 PROCEDURE — 77063 BREAST TOMOSYNTHESIS BI: CPT

## 2018-08-06 ENCOUNTER — HOSPITAL ENCOUNTER (OUTPATIENT)
Dept: MAMMOGRAPHY | Facility: CLINIC | Age: 48
Discharge: HOME | End: 2018-08-06

## 2018-09-10 ENCOUNTER — OFFICE VISIT (OUTPATIENT)
Dept: FAMILY MEDICINE CLINIC | Facility: CLINIC | Age: 48
End: 2018-09-10
Payer: COMMERCIAL

## 2018-09-10 VITALS
DIASTOLIC BLOOD PRESSURE: 74 MMHG | OXYGEN SATURATION: 99 % | TEMPERATURE: 97.6 F | HEIGHT: 65 IN | SYSTOLIC BLOOD PRESSURE: 118 MMHG | WEIGHT: 147 LBS | HEART RATE: 88 BPM | BODY MASS INDEX: 24.49 KG/M2 | RESPIRATION RATE: 18 BRPM

## 2018-09-10 DIAGNOSIS — F41.9 ANXIETY: Primary | ICD-10-CM

## 2018-09-10 DIAGNOSIS — F51.01 PRIMARY INSOMNIA: ICD-10-CM

## 2018-09-10 PROCEDURE — 99214 OFFICE O/P EST MOD 30 MIN: CPT | Performed by: FAMILY MEDICINE

## 2018-09-10 PROCEDURE — 3008F BODY MASS INDEX DOCD: CPT | Performed by: FAMILY MEDICINE

## 2018-09-10 RX ORDER — ALPRAZOLAM 0.25 MG/1
0.25 TABLET ORAL
Qty: 30 TABLET | Refills: 0 | Status: SHIPPED | OUTPATIENT
Start: 2018-09-10 | End: 2018-10-10 | Stop reason: SDUPTHER

## 2018-09-10 RX ORDER — ESCITALOPRAM OXALATE 10 MG/1
10 TABLET ORAL DAILY
Qty: 30 TABLET | Refills: 0 | Status: SHIPPED | OUTPATIENT
Start: 2018-09-10 | End: 2018-09-20

## 2018-09-10 NOTE — PROGRESS NOTES
Assessment/Plan:         Diagnoses and all orders for this visit:    Anxiety  -     escitalopram (LEXAPRO) 10 mg tablet; Take 1 tablet (10 mg total) by mouth daily    Primary insomnia  -     ALPRAZolam (XANAX) 0 25 mg tablet; Take 1 tablet (0 25 mg total) by mouth daily at bedtime as needed for anxiety  -     escitalopram (LEXAPRO) 10 mg tablet; Take 1 tablet (10 mg total) by mouth daily      spent 25 minutes and more than 50% was spent counseling     Subjective:      Patient ID: Jose Parnell is a 51 y o  female  Feels like internal vibrating sensation around her legs at home on and off for the last 1 year  Feels like her hands feel numb at time   Panic attack at work last week- stressful at home     [facility] 64 for initial visit  The problem has been gradually worsening  Symptoms include chest pain, excessive worry, irritability, muscle tension, nervous/anxious behavior, obsessions, palpitations and panic  Patient reports no compulsions, confusion, decreased concentration, depressed mood, dizziness, dry mouth, feeling of choking, hyperventilation, impotence, insomnia, malaise, nausea, restlessness, shortness of breath or suicidal ideas  Symptoms occur most days  The severity of symptoms is mild  The quality of sleep is good  Nighttime awakenings: occasional      There are no known risk factors  Her past medical history is significant for depression  There is no history of anemia, anxiety/panic attacks, arrhythmia, asthma, bipolar disorder, CAD, CHF, chronic lung disease, fibromyalgia, hyperthyroidism or suicide attempts  Compliance with prior treatments has been good  The following portions of the patient's history were reviewed and updated as appropriate: allergies, current medications, past family history, past medical history, past social history, past surgical history and problem list     Review of Systems   Constitutional: Positive for irritability     Respiratory: Negative for shortness of breath  Cardiovascular: Positive for chest pain and palpitations  Gastrointestinal: Negative for nausea  Genitourinary: Negative for impotence  Neurological: Negative for dizziness  Psychiatric/Behavioral: Negative for confusion, decreased concentration and suicidal ideas  The patient is nervous/anxious  The patient does not have insomnia  Past Medical History:   Diagnosis Date    Abnormal Pap smear of cervix     HPV (human papilloma virus) infection     Known health problems: none 2018    Migraine      Past Surgical History:   Procedure Laterality Date     SECTION      EYE SURGERY       Social History     Social History    Marital status: Single     Spouse name: N/A    Number of children: 3    Years of education: N/A     Occupational History    Not on file       Social History Main Topics    Smoking status: Never Smoker    Smokeless tobacco: Never Used    Alcohol use No    Drug use: No    Sexual activity: Not Currently     Birth control/ protection: None      Comment: birth control not practiced     Other Topics Concern    Not on file     Social History Narrative    Caffeine use    Uses safety equipment seatbelts     Allergies   Allergen Reactions    Seasonal Ic  [Cholestatin]     Sulfamethoxazole-Trimethoprim Rash         Family History   Problem Relation Age of Onset    Kidney failure Father         acute per allscripts    Diabetes type II Father            Current Outpatient Prescriptions:     ALPRAZolam (XANAX) 0 25 mg tablet, Take 1 tablet (0 25 mg total) by mouth daily at bedtime as needed for anxiety, Disp: 30 tablet, Rfl: 0    ibuprofen (MOTRIN) 800 mg tablet, ibuprofen 800 mg tablet, Disp: , Rfl:     Magnesium 500 MG TABS, Take 1 tablet by mouth, Disp: , Rfl:     rizatriptan (MAXALT-MLT) 10 MG disintegrating tablet, DISSOLVE 1 TABLET BY MOUTH ONCE AS NEEDED FOR MIGRAINE FOR UP TO 1 DOSE  MAY REPEAT IN 2 HOURS IF NEEDED, Disp: 9 tablet, Rfl: 0   clotrimazole-betamethasone (LOTRISONE) 1-0 05 % cream, Apply topically 2 (two) times a day (Patient not taking: Reported on 9/10/2018 ), Disp: 30 g, Rfl: 0    escitalopram (LEXAPRO) 10 mg tablet, Take 1 tablet (10 mg total) by mouth daily, Disp: 30 tablet, Rfl: 0    polyethylene glycol (MIRALAX) 17 g packet, Take 17 g by mouth daily (Patient not taking: Reported on 9/10/2018 ), Disp: 14 each, Rfl: 0          Objective:      /74 (BP Location: Left arm, Patient Position: Sitting, Cuff Size: Standard)   Pulse 88   Temp 97 6 °F (36 4 °C)   Resp 18   Ht 5' 5" (1 651 m)   Wt 66 7 kg (147 lb)   SpO2 99%   BMI 24 46 kg/m²          Physical Exam   Constitutional: She is oriented to person, place, and time  She appears well-developed and well-nourished  HENT:   Head: Normocephalic and atraumatic  Eyes: EOM are normal  Pupils are equal, round, and reactive to light  Cardiovascular: Normal rate and regular rhythm  Pulmonary/Chest: Effort normal and breath sounds normal    Neurological: She is alert and oriented to person, place, and time     Psychiatric:   + anxious, crying

## 2018-09-13 ENCOUNTER — APPOINTMENT (OUTPATIENT)
Dept: LAB | Facility: CLINIC | Age: 48
End: 2018-09-13
Payer: COMMERCIAL

## 2018-09-13 ENCOUNTER — TRANSCRIBE ORDERS (OUTPATIENT)
Dept: LAB | Facility: CLINIC | Age: 48
End: 2018-09-13

## 2018-09-13 DIAGNOSIS — Z01.810 PRE-OPERATIVE CARDIOVASCULAR EXAMINATION: Primary | ICD-10-CM

## 2018-09-13 DIAGNOSIS — Z01.818 PREOPERATIVE TESTING: ICD-10-CM

## 2018-09-13 LAB
ABO GROUP BLD: NORMAL
BLD GP AB SCN SERPL QL: NEGATIVE
ERYTHROCYTE [DISTWIDTH] IN BLOOD BY AUTOMATED COUNT: 13.2 % (ref 11.6–15.1)
EST. AVERAGE GLUCOSE BLD GHB EST-MCNC: 97 MG/DL
HBA1C MFR BLD: 5 % (ref 4.2–6.3)
HCT VFR BLD AUTO: 37.3 % (ref 34.8–46.1)
HGB BLD-MCNC: 11.3 G/DL (ref 11.5–15.4)
MCH RBC QN AUTO: 28.5 PG (ref 26.8–34.3)
MCHC RBC AUTO-ENTMCNC: 30.3 G/DL (ref 31.4–37.4)
MCV RBC AUTO: 94 FL (ref 82–98)
PLATELET # BLD AUTO: 233 THOUSANDS/UL (ref 149–390)
PMV BLD AUTO: 11 FL (ref 8.9–12.7)
RBC # BLD AUTO: 3.97 MILLION/UL (ref 3.81–5.12)
RH BLD: POSITIVE
SPECIMEN EXPIRATION DATE: NORMAL
WBC # BLD AUTO: 5.1 THOUSAND/UL (ref 4.31–10.16)

## 2018-09-13 PROCEDURE — 86901 BLOOD TYPING SEROLOGIC RH(D): CPT

## 2018-09-13 PROCEDURE — 36415 COLL VENOUS BLD VENIPUNCTURE: CPT

## 2018-09-13 PROCEDURE — 86900 BLOOD TYPING SEROLOGIC ABO: CPT

## 2018-09-13 PROCEDURE — 83036 HEMOGLOBIN GLYCOSYLATED A1C: CPT

## 2018-09-13 PROCEDURE — 86850 RBC ANTIBODY SCREEN: CPT

## 2018-09-13 PROCEDURE — 85027 COMPLETE CBC AUTOMATED: CPT

## 2018-09-14 ENCOUNTER — HOSPITAL ENCOUNTER (OUTPATIENT)
Dept: MAMMOGRAPHY | Facility: CLINIC | Age: 48
Discharge: HOME | End: 2018-09-14
Payer: COMMERCIAL

## 2018-09-14 ENCOUNTER — HOSPITAL ENCOUNTER (OUTPATIENT)
Dept: ULTRASOUND IMAGING | Facility: CLINIC | Age: 48
Discharge: HOME | End: 2018-09-14
Payer: COMMERCIAL

## 2018-09-14 DIAGNOSIS — R92.8 ABNORMAL MAMMOGRAM: ICD-10-CM

## 2018-09-14 PROCEDURE — 76642 ULTRASOUND BREAST LIMITED: CPT

## 2018-09-14 PROCEDURE — 77065 DX MAMMO INCL CAD UNI: CPT

## 2018-09-14 PROCEDURE — G0279 TOMOSYNTHESIS, MAMMO: HCPCS

## 2018-09-18 DIAGNOSIS — N63.10 BREAST MASS, RIGHT: Primary | ICD-10-CM

## 2018-09-20 ENCOUNTER — ANESTHESIA EVENT (OUTPATIENT)
Dept: PERIOP | Facility: HOSPITAL | Age: 48
End: 2018-09-20
Payer: COMMERCIAL

## 2018-09-20 NOTE — PRE-PROCEDURE INSTRUCTIONS
Pre-Surgery Instructions:   Medication Instructions    ALPRAZolam (XANAX) 0 25 mg tablet Instructed patient per Anesthesia Guidelines   Ascorbic Acid (VITAMIN C PO) Instructed patient per Anesthesia Guidelines   ibuprofen (MOTRIN) 800 mg tablet Patient was instructed by Physician and understands   rizatriptan (MAXALT-MLT) 10 MG disintegrating tablet Instructed patient per Anesthesia Guidelines  Instructed to take alprazolam   If needed am of surgery with sip ofw ater per anesthesia

## 2018-09-24 DIAGNOSIS — Z41.9 SURGERY, ELECTIVE: Primary | ICD-10-CM

## 2018-09-25 ENCOUNTER — ANESTHESIA (OUTPATIENT)
Dept: PERIOP | Facility: HOSPITAL | Age: 48
End: 2018-09-25
Payer: COMMERCIAL

## 2018-09-25 ENCOUNTER — HOSPITAL ENCOUNTER (OUTPATIENT)
Facility: HOSPITAL | Age: 48
Setting detail: OUTPATIENT SURGERY
Discharge: HOME | End: 2018-09-26
Attending: OBSTETRICS & GYNECOLOGY | Admitting: OBSTETRICS & GYNECOLOGY
Payer: COMMERCIAL

## 2018-09-25 DIAGNOSIS — N81.3 COMPLETE UTEROVAGINAL PROLAPSE: ICD-10-CM

## 2018-09-25 DIAGNOSIS — Z90.710 S/P VAGINAL HYSTERECTOMY: Primary | ICD-10-CM

## 2018-09-25 LAB
ABO GROUP BLD: NORMAL
ANION GAP SERPL CALCULATED.3IONS-SCNC: 9 MMOL/L (ref 4–13)
APTT PPP: 26 SECONDS (ref 24–36)
BLD GP AB SCN SERPL QL: NEGATIVE
BUN SERPL-MCNC: 10 MG/DL (ref 5–25)
CALCIUM SERPL-MCNC: 8.8 MG/DL (ref 8.3–10.1)
CHLORIDE SERPL-SCNC: 108 MMOL/L (ref 100–108)
CO2 SERPL-SCNC: 23 MMOL/L (ref 21–32)
CREAT SERPL-MCNC: 0.76 MG/DL (ref 0.6–1.3)
ERYTHROCYTE [DISTWIDTH] IN BLOOD BY AUTOMATED COUNT: 12.9 % (ref 11.6–15.1)
ERYTHROCYTE [DISTWIDTH] IN BLOOD BY AUTOMATED COUNT: 12.9 % (ref 11.6–15.1)
EXT PREGNANCY TEST URINE: NEGATIVE
FIBRINOGEN PPP-MCNC: 214 MG/DL (ref 227–495)
GFR SERPL CREATININE-BSD FRML MDRD: 92 ML/MIN/1.73SQ M
GLUCOSE P FAST SERPL-MCNC: 144 MG/DL (ref 65–99)
GLUCOSE SERPL-MCNC: 113 MG/DL (ref 65–140)
GLUCOSE SERPL-MCNC: 144 MG/DL (ref 65–140)
HCT VFR BLD AUTO: 31.4 % (ref 34.8–46.1)
HCT VFR BLD AUTO: 32.8 % (ref 34.8–46.1)
HGB BLD-MCNC: 10.1 G/DL (ref 11.5–15.4)
HGB BLD-MCNC: 9.9 G/DL (ref 11.5–15.4)
INR PPP: 1.06 (ref 0.86–1.17)
MCH RBC QN AUTO: 28.6 PG (ref 26.8–34.3)
MCH RBC QN AUTO: 28.9 PG (ref 26.8–34.3)
MCHC RBC AUTO-ENTMCNC: 30.8 G/DL (ref 31.4–37.4)
MCHC RBC AUTO-ENTMCNC: 31.5 G/DL (ref 31.4–37.4)
MCV RBC AUTO: 92 FL (ref 82–98)
MCV RBC AUTO: 93 FL (ref 82–98)
PLATELET # BLD AUTO: 196 THOUSANDS/UL (ref 149–390)
PLATELET # BLD AUTO: 205 THOUSANDS/UL (ref 149–390)
PMV BLD AUTO: 10.1 FL (ref 8.9–12.7)
PMV BLD AUTO: 9.6 FL (ref 8.9–12.7)
POTASSIUM SERPL-SCNC: 4.1 MMOL/L (ref 3.5–5.3)
PROTHROMBIN TIME: 13.9 SECONDS (ref 11.8–14.2)
RBC # BLD AUTO: 3.42 MILLION/UL (ref 3.81–5.12)
RBC # BLD AUTO: 3.53 MILLION/UL (ref 3.81–5.12)
RH BLD: POSITIVE
SODIUM SERPL-SCNC: 140 MMOL/L (ref 136–145)
SPECIMEN EXPIRATION DATE: NORMAL
WBC # BLD AUTO: 14 THOUSAND/UL (ref 4.31–10.16)
WBC # BLD AUTO: 14.59 THOUSAND/UL (ref 4.31–10.16)

## 2018-09-25 PROCEDURE — 85384 FIBRINOGEN ACTIVITY: CPT | Performed by: OBSTETRICS & GYNECOLOGY

## 2018-09-25 PROCEDURE — 86901 BLOOD TYPING SEROLOGIC RH(D): CPT | Performed by: OBSTETRICS & GYNECOLOGY

## 2018-09-25 PROCEDURE — 86900 BLOOD TYPING SEROLOGIC ABO: CPT | Performed by: OBSTETRICS & GYNECOLOGY

## 2018-09-25 PROCEDURE — 58290 VAG HYST COMPLEX: CPT | Performed by: OBSTETRICS & GYNECOLOGY

## 2018-09-25 PROCEDURE — 85027 COMPLETE CBC AUTOMATED: CPT | Performed by: OBSTETRICS & GYNECOLOGY

## 2018-09-25 PROCEDURE — 80048 BASIC METABOLIC PNL TOTAL CA: CPT | Performed by: OBSTETRICS & GYNECOLOGY

## 2018-09-25 PROCEDURE — 86850 RBC ANTIBODY SCREEN: CPT | Performed by: OBSTETRICS & GYNECOLOGY

## 2018-09-25 PROCEDURE — 88305 TISSUE EXAM BY PATHOLOGIST: CPT | Performed by: PATHOLOGY

## 2018-09-25 PROCEDURE — 82948 REAGENT STRIP/BLOOD GLUCOSE: CPT

## 2018-09-25 PROCEDURE — 85610 PROTHROMBIN TIME: CPT | Performed by: OBSTETRICS & GYNECOLOGY

## 2018-09-25 PROCEDURE — 85730 THROMBOPLASTIN TIME PARTIAL: CPT | Performed by: OBSTETRICS & GYNECOLOGY

## 2018-09-25 PROCEDURE — 57282 COLPOPEXY EXTRAPERITONEAL: CPT | Performed by: OBSTETRICS & GYNECOLOGY

## 2018-09-25 PROCEDURE — 81025 URINE PREGNANCY TEST: CPT | Performed by: ANESTHESIOLOGY

## 2018-09-25 RX ORDER — ALPRAZOLAM 0.25 MG/1
0.25 TABLET ORAL
Status: DISCONTINUED | OUTPATIENT
Start: 2018-09-25 | End: 2018-09-26 | Stop reason: HOSPADM

## 2018-09-25 RX ORDER — GLYCOPYRROLATE 0.2 MG/ML
INJECTION INTRAMUSCULAR; INTRAVENOUS AS NEEDED
Status: DISCONTINUED | OUTPATIENT
Start: 2018-09-25 | End: 2018-09-25 | Stop reason: SURG

## 2018-09-25 RX ORDER — ONDANSETRON 2 MG/ML
4 INJECTION INTRAMUSCULAR; INTRAVENOUS EVERY 6 HOURS PRN
Status: DISCONTINUED | OUTPATIENT
Start: 2018-09-25 | End: 2018-09-26 | Stop reason: HOSPADM

## 2018-09-25 RX ORDER — OXYCODONE HYDROCHLORIDE AND ACETAMINOPHEN 5; 325 MG/1; MG/1
2 TABLET ORAL EVERY 4 HOURS PRN
Status: DISCONTINUED | OUTPATIENT
Start: 2018-09-25 | End: 2018-09-26 | Stop reason: HOSPADM

## 2018-09-25 RX ORDER — DEXAMETHASONE SODIUM PHOSPHATE 4 MG/ML
4 INJECTION, SOLUTION INTRA-ARTICULAR; INTRALESIONAL; INTRAMUSCULAR; INTRAVENOUS; SOFT TISSUE ONCE
Status: COMPLETED | OUTPATIENT
Start: 2018-09-25 | End: 2018-09-25

## 2018-09-25 RX ORDER — SODIUM CHLORIDE, SODIUM LACTATE, POTASSIUM CHLORIDE, CALCIUM CHLORIDE 600; 310; 30; 20 MG/100ML; MG/100ML; MG/100ML; MG/100ML
125 INJECTION, SOLUTION INTRAVENOUS CONTINUOUS
Status: DISCONTINUED | OUTPATIENT
Start: 2018-09-25 | End: 2018-09-26

## 2018-09-25 RX ORDER — MIDAZOLAM HYDROCHLORIDE 1 MG/ML
INJECTION INTRAMUSCULAR; INTRAVENOUS AS NEEDED
Status: DISCONTINUED | OUTPATIENT
Start: 2018-09-25 | End: 2018-09-25 | Stop reason: SURG

## 2018-09-25 RX ORDER — FENTANYL CITRATE 50 UG/ML
INJECTION, SOLUTION INTRAMUSCULAR; INTRAVENOUS AS NEEDED
Status: DISCONTINUED | OUTPATIENT
Start: 2018-09-25 | End: 2018-09-25 | Stop reason: SURG

## 2018-09-25 RX ORDER — SUMATRIPTAN 25 MG/1
25 TABLET, FILM COATED ORAL ONCE AS NEEDED
Status: COMPLETED | OUTPATIENT
Start: 2018-09-25 | End: 2018-09-25

## 2018-09-25 RX ORDER — HYDROMORPHONE HYDROCHLORIDE 2 MG/ML
INJECTION, SOLUTION INTRAMUSCULAR; INTRAVENOUS; SUBCUTANEOUS AS NEEDED
Status: DISCONTINUED | OUTPATIENT
Start: 2018-09-25 | End: 2018-09-25 | Stop reason: SURG

## 2018-09-25 RX ORDER — VITAMIN B COMPLEX
3 CAPSULE ORAL DAILY
COMMUNITY
End: 2019-10-14 | Stop reason: ALTCHOICE

## 2018-09-25 RX ORDER — ONDANSETRON 2 MG/ML
INJECTION INTRAMUSCULAR; INTRAVENOUS AS NEEDED
Status: DISCONTINUED | OUTPATIENT
Start: 2018-09-25 | End: 2018-09-25 | Stop reason: SURG

## 2018-09-25 RX ORDER — DEXAMETHASONE SODIUM PHOSPHATE 4 MG/ML
INJECTION, SOLUTION INTRA-ARTICULAR; INTRALESIONAL; INTRAMUSCULAR; INTRAVENOUS; SOFT TISSUE AS NEEDED
Status: DISCONTINUED | OUTPATIENT
Start: 2018-09-25 | End: 2018-09-25 | Stop reason: SURG

## 2018-09-25 RX ORDER — DIPHENHYDRAMINE HYDROCHLORIDE 50 MG/ML
INJECTION INTRAMUSCULAR; INTRAVENOUS AS NEEDED
Status: DISCONTINUED | OUTPATIENT
Start: 2018-09-25 | End: 2018-09-25 | Stop reason: SURG

## 2018-09-25 RX ORDER — SODIUM CHLORIDE 9 MG/ML
125 INJECTION, SOLUTION INTRAVENOUS CONTINUOUS
Status: DISCONTINUED | OUTPATIENT
Start: 2018-09-25 | End: 2018-09-25

## 2018-09-25 RX ORDER — FENTANYL CITRATE/PF 50 MCG/ML
25 SYRINGE (ML) INJECTION
Status: DISCONTINUED | OUTPATIENT
Start: 2018-09-25 | End: 2018-09-25 | Stop reason: HOSPADM

## 2018-09-25 RX ORDER — ROCURONIUM BROMIDE 10 MG/ML
INJECTION, SOLUTION INTRAVENOUS AS NEEDED
Status: DISCONTINUED | OUTPATIENT
Start: 2018-09-25 | End: 2018-09-25 | Stop reason: SURG

## 2018-09-25 RX ORDER — 0.9 % SODIUM CHLORIDE 0.9 %
VIAL (ML) INJECTION AS NEEDED
Status: DISCONTINUED | OUTPATIENT
Start: 2018-09-25 | End: 2018-09-25 | Stop reason: HOSPADM

## 2018-09-25 RX ORDER — ONDANSETRON 2 MG/ML
4 INJECTION INTRAMUSCULAR; INTRAVENOUS ONCE
Status: COMPLETED | OUTPATIENT
Start: 2018-09-25 | End: 2018-09-25

## 2018-09-25 RX ORDER — SODIUM CHLORIDE 9 MG/ML
INJECTION, SOLUTION INTRAVENOUS CONTINUOUS PRN
Status: DISCONTINUED | OUTPATIENT
Start: 2018-09-25 | End: 2018-09-25 | Stop reason: SURG

## 2018-09-25 RX ORDER — OXYCODONE HYDROCHLORIDE AND ACETAMINOPHEN 5; 325 MG/1; MG/1
1 TABLET ORAL EVERY 6 HOURS PRN
Qty: 20 TABLET | Refills: 0 | Status: SHIPPED | OUTPATIENT
Start: 2018-09-25 | End: 2018-10-05

## 2018-09-25 RX ORDER — OXYCODONE HYDROCHLORIDE AND ACETAMINOPHEN 5; 325 MG/1; MG/1
1 TABLET ORAL EVERY 4 HOURS PRN
Status: DISCONTINUED | OUTPATIENT
Start: 2018-09-25 | End: 2018-09-26 | Stop reason: HOSPADM

## 2018-09-25 RX ORDER — PROPOFOL 10 MG/ML
INJECTION, EMULSION INTRAVENOUS AS NEEDED
Status: DISCONTINUED | OUTPATIENT
Start: 2018-09-25 | End: 2018-09-25 | Stop reason: SURG

## 2018-09-25 RX ORDER — IBUPROFEN 600 MG/1
600 TABLET ORAL EVERY 6 HOURS PRN
Status: DISCONTINUED | OUTPATIENT
Start: 2018-09-25 | End: 2018-09-26 | Stop reason: HOSPADM

## 2018-09-25 RX ORDER — EPHEDRINE SULFATE 50 MG/ML
INJECTION, SOLUTION INTRAVENOUS AS NEEDED
Status: DISCONTINUED | OUTPATIENT
Start: 2018-09-25 | End: 2018-09-25 | Stop reason: SURG

## 2018-09-25 RX ADMIN — ROCURONIUM BROMIDE 40 MG: 10 INJECTION INTRAVENOUS at 09:14

## 2018-09-25 RX ADMIN — FENTANYL CITRATE 50 MCG: 50 INJECTION, SOLUTION INTRAMUSCULAR; INTRAVENOUS at 10:52

## 2018-09-25 RX ADMIN — DEXAMETHASONE SODIUM PHOSPHATE 4 MG: 4 INJECTION, SOLUTION INTRAMUSCULAR; INTRAVENOUS at 09:20

## 2018-09-25 RX ADMIN — ROCURONIUM BROMIDE 10 MG: 10 INJECTION INTRAVENOUS at 09:26

## 2018-09-25 RX ADMIN — SODIUM CHLORIDE: 0.9 INJECTION, SOLUTION INTRAVENOUS at 11:34

## 2018-09-25 RX ADMIN — MIDAZOLAM 2 MG: 1 INJECTION INTRAMUSCULAR; INTRAVENOUS at 09:04

## 2018-09-25 RX ADMIN — NEOSTIGMINE METHYLSULFATE 2.5 MG: 1 INJECTION, SOLUTION INTRAMUSCULAR; INTRAVENOUS; SUBCUTANEOUS at 11:15

## 2018-09-25 RX ADMIN — SUMATRIPTAN SUCCINATE 25 MG: 25 TABLET ORAL at 23:49

## 2018-09-25 RX ADMIN — SODIUM CHLORIDE: 0.9 INJECTION, SOLUTION INTRAVENOUS at 09:24

## 2018-09-25 RX ADMIN — SODIUM CHLORIDE, SODIUM LACTATE, POTASSIUM CHLORIDE, AND CALCIUM CHLORIDE 125 ML/HR: .6; .31; .03; .02 INJECTION, SOLUTION INTRAVENOUS at 12:32

## 2018-09-25 RX ADMIN — HYDROMORPHONE HYDROCHLORIDE 0.5 MG: 2 INJECTION, SOLUTION INTRAMUSCULAR; INTRAVENOUS; SUBCUTANEOUS at 10:24

## 2018-09-25 RX ADMIN — ONDANSETRON HYDROCHLORIDE 4 MG: 2 INJECTION, SOLUTION INTRAVENOUS at 10:57

## 2018-09-25 RX ADMIN — PROPOFOL 200 MG: 10 INJECTION, EMULSION INTRAVENOUS at 09:14

## 2018-09-25 RX ADMIN — GLYCOPYRROLATE 0.5 MG: 0.2 INJECTION, SOLUTION INTRAMUSCULAR; INTRAVENOUS at 11:15

## 2018-09-25 RX ADMIN — FENTANYL CITRATE 50 MCG: 50 INJECTION, SOLUTION INTRAMUSCULAR; INTRAVENOUS at 09:32

## 2018-09-25 RX ADMIN — EPHEDRINE SULFATE 5 MG: 50 INJECTION, SOLUTION INTRAMUSCULAR; INTRAVENOUS; SUBCUTANEOUS at 10:13

## 2018-09-25 RX ADMIN — ONDANSETRON 4 MG: 2 INJECTION INTRAMUSCULAR; INTRAVENOUS at 12:06

## 2018-09-25 RX ADMIN — FENTANYL CITRATE 100 MCG: 50 INJECTION, SOLUTION INTRAMUSCULAR; INTRAVENOUS at 09:14

## 2018-09-25 RX ADMIN — SODIUM CHLORIDE 1000 ML: 0.9 INJECTION, SOLUTION INTRAVENOUS at 14:46

## 2018-09-25 RX ADMIN — DEXAMETHASONE SODIUM PHOSPHATE 4 MG: 4 INJECTION, SOLUTION INTRAMUSCULAR; INTRAVENOUS at 12:25

## 2018-09-25 RX ADMIN — TRIMETHOBENZAMIDE HYDROCHLORIDE 200 MG: 100 INJECTION INTRAMUSCULAR at 12:42

## 2018-09-25 RX ADMIN — DIPHENHYDRAMINE HYDROCHLORIDE 12.5 MG: 50 INJECTION INTRAMUSCULAR; INTRAVENOUS at 09:20

## 2018-09-25 RX ADMIN — CEFAZOLIN SODIUM 1000 MG: 1 SOLUTION INTRAVENOUS at 09:20

## 2018-09-25 RX ADMIN — SODIUM CHLORIDE 125 ML/HR: 0.9 INJECTION, SOLUTION INTRAVENOUS at 07:53

## 2018-09-25 RX ADMIN — EPHEDRINE SULFATE 10 MG: 50 INJECTION, SOLUTION INTRAMUSCULAR; INTRAVENOUS; SUBCUTANEOUS at 10:16

## 2018-09-25 RX ADMIN — OXYCODONE AND ACETAMINOPHEN 2 TABLET: 5; 325 TABLET ORAL at 16:15

## 2018-09-25 RX ADMIN — OXYCODONE AND ACETAMINOPHEN 2 TABLET: 5; 325 TABLET ORAL at 20:28

## 2018-09-25 RX ADMIN — LIDOCAINE HYDROCHLORIDE 50 MG: 20 INJECTION, SOLUTION INTRAVENOUS at 09:14

## 2018-09-25 NOTE — PROGRESS NOTES
Subjective:  Patient examined for nursing report of hypotensive BP's of 80/40's upon arrival to the med-surg floor  Patient endorses dizziness upon standing, voided 200cc in the toilet, nursing bladder scanned 400cc  Patient in bed reports feeling weak & a headache  Pulse running in the 90'sbpm     Objective:  Vitals:    09/25/18 1407   BP: (!) 86/54   Pulse: 97   Resp:    Temp:    SpO2:      Physical exam:  NAD  S1 S2 RRR  CTAB  Abdomen soft nontender to palpation  : vaginal packing in place, no evidence of bleeding    A/P: 47yo s/p TVH, right sacrospinous ligament fixation    -will send STAT CBC, BMP, coags  -will give 1L NS bolus  -strict I&O's, consider Edwards catheterization

## 2018-09-25 NOTE — ANESTHESIA PREPROCEDURE EVALUATION
Review of Systems/Medical History  Patient summary reviewed  Chart reviewed  History of anesthetic complications PONV    Cardiovascular  Negative cardio ROS    Pulmonary  Negative pulmonary ROS        GI/Hepatic  Negative GI/hepatic ROS          Negative  ROS        Endo/Other  Negative endo/other ROS      GYN  Negative gynecology ROS          Hematology   Musculoskeletal  Negative musculoskeletal ROS        Neurology    Headaches,   Comment: Last migraine was a week ago Psychology   Anxiety,              Physical Exam    Airway    Mallampati score: II  TM Distance: >3 FB  Neck ROM: full     Dental   No notable dental hx     Cardiovascular  Comment: Negative ROS, Rhythm: regular, Rate: normal, Cardiovascular exam normal    Pulmonary  Breath sounds clear to auscultation,     Other Findings        Anesthesia Plan  ASA Score- 2     Anesthesia Type- general with ASA Monitors  Additional Monitors:   Airway Plan: ETT  Comment: Pt doesn't wan scopolamine patch, because she has issues with her eyes        Plan Factors-    Induction- intravenous  Postoperative Plan- Plan for postoperative opioid use  Informed Consent- Anesthetic plan and risks discussed with patient

## 2018-09-25 NOTE — DISCHARGE INSTRUCTIONS
Vaginal Hysterectomy   WHAT YOU SHOULD KNOW:   A vaginal hysterectomy is surgery to remove your uterus through your vagina  Other organs, such as your ovaries and fallopian tubes, may also be removed  AFTER YOU LEAVE:   Medicines:   · Anticoagulants    are a type of blood thinner medicine that helps prevent clots  Clots can cause strokes, heart attacks, and death  These medicines may cause you to bleed or bruise more easily  ¨ Watch for bleeding from your gums or nose  Watch for blood in your urine and bowel movements  Use a soft washcloth and a soft toothbrush  If you shave, use an electric razor  Avoid activities that can cause bruising or bleeding  ¨ Tell your healthcare provider about all medicines you take because many medicines cannot be used with anticoagulants  Do not start or stop any medicines unless your healthcare provider tells you to  Tell your dentist and other healthcare providers that you take anticoagulants  Wear a bracelet or necklace that says you take this medicine  ¨ You will need regular blood tests so your healthcare provider can decide how much medicine you need  Take anticoagulants exactly as directed  Tell your healthcare provider right away if you forget to take the medicine, or if you take too much  ¨ If you take warfarin, some foods can change how your blood clots  Do not make major changes to your diet while you take warfarin  Warfarin works best when you eat about the same amount of vitamin K every day  Vitamin K is found in green leafy vegetables, broccoli, grapes, and other foods  Ask for more information about what to eat when you take warfarin  · Prescription pain medicine  may be given  Ask your PHP how to take this medicine safely  · Antibiotics  help treat or prevent a bacterial infection  · Take your medicine as directed  Contact your PHP if you think your medicine is not helping or if you have side effects   Tell him if you are allergic to any medicine  Keep a list of the medicines, vitamins, and herbs you take  Include the amounts, and when and why you take them  Bring the list or the pill bottles to follow-up visits  Carry your medicine list with you in case of an emergency  Follow up with your PHP or gynecologist as directed: You may need to return for blood tests, ultrasound, CT scan, or other tests  Write down your questions so you remember to ask them during your visits  Rest as needed: You may feel like resting more after surgery  Slowly start to do more each day  Ask when you can return to your usual activities  Contact your PHP or gynecologist if:   · You have heavy vaginal bleeding that fills 1 or more sanitary pads in 1 hour  · You have a fever  · You feel pain when you urinate, or you have trouble urinating  · You feel pain during sex  · You feel pain or fullness in your vagina  · You feel like something is sticking out of your vagina  · You have questions or concerns about your condition or care  Seek care immediately or call 911 if:   · Your arm or leg feels warm, tender, and painful  It may look swollen and red  · You feel lightheaded, short of breath, and have chest pain  · You cough up blood  © 2014 [address] is for End User's use only and may not be sold, redistributed or otherwise used for commercial purposes  All illustrations and images included in CareNotes® are the copyrighted property of [employer]  or [employer] Estr[employer]  The above information is an  only  It is not intended as medical advice for individual conditions or treatments  Talk to your doctor, nurse or pharmacist before following any medical regimen to see if it is safe and effective for you

## 2018-09-25 NOTE — OP NOTE
OPERATIVE REPORT  PATIENT NAME: Bronson Amado    :  1970  MRN: [id number]  Pt Location: [facility] OR ROOM 04    SURGERY DATE: 2018    Surgeon(s) and Role:     * Sarath Banerjee MD - Primary     * Jude Taylor MD - Assisting    Preop Diagnosis:  Complete uterovaginal prolapse [N81 3]    Post-Op Diagnosis Codes:     * Complete uterovaginal prolapse [N81 3]    Procedure(s) (LRB):  TOTAL VAG  HYSTERECTOMY (N/A)  FIXATION LIGAMENT SACROSPINOUS, RIGHT (Right)  CYSTOSCOPY (N/A)    Specimen(s):  ID Type Source Tests Collected by Time Destination   1 : Uterus and cervix Tissue Uterus TISSUE EXAM Sarath Banerjee MD 2018 1049        Estimated Blood Loss:   100 mL    Drains:  [REMOVED] Urethral Catheter Non-latex; Double-lumen 16 Fr  (Removed)   Number of days: 0     Anesthesia Type:   General - ETT    Operative Indications:  Complete uterovaginal prolapse [N81 3]    Operative Findings:  -third degree uterine prolapse  -bilateral ovaries & fallopian tubes visualized, simple cyst of right ovary s/p cystotomy  -all pedicle sites hemostatic at conclusion of procedure  -cystoscopy: bilateral ureteral jets visualized peristalsis, no evidence of bladder injury    Complications:   None    Procedure and Technique:  Appropriate preoperative antibiotics chosen per ACOG guidelines were given  Bilateral SCD's were placed in the lower extremities for DVT prevention prior to the institution of anesthesia  No bladder, ureteral, viscus, or solid organ injury were noted at the end of the procedure  The patient was identified in the holding area by the operating room staff and attending physician  She was taken to the operating room where anesthesia was instituted without complications  She was placed in the dorsal lithotomy position with the legs in [address] [place] [address] with care taken to avoid excessive flexion or extension of her lower extremities  The patient was prepped and draped in the usual sterile fashion   A Edwards catheter was inserted  The cervix was grasped with a single-toothed tenaculum at 12 o'clock position  Dilute marcaine with epinephrine was injected into the cervical stroma at 12, 3, 6 and 9 o'clock positions  An incision was made anteriorly at the level of the cervicovaginal junction with the Bovie cautery  The incision was continued posteriorly in a V formation towards the posterior cul-de-sac  The posterior cul-de-sac was entered sharply with Metzenbaum scissors without difficulty  The bladder was carefully dissected off the pubocervical fascia anteriorly by sharp dissection with Metzenbaum scissors  The anterior peritoneum was visualized but not yet entered  We began the hysterectomy by grasping the uterosacral ligaments on either side with a Weston clamp  These were then transected and suture ligated with 0 Vicryl suture  Excellent hemostasis was assured  We proceeded with grasping the cardinal ligaments bilaterally with Weston clamp and dissecting this and suture ligating in a similar fashion  At this point, we were able to enter the vesicouterine space and a [place] was placed to protect the bladder  Once we entered the vesicouterine space, the uterine arteries &  broad ligament were then serially clamped with Weston clamps, transected and suture ligated on both sides  Excellent hemostasis was visualized  The uterus was inverted and the fundus partially delivered through the vagina  The bilateral cornua and tuboovarian ligaments were clamped with Weston clamps and the pedicles suture ligated with 0-Vicryl suture  The specimen was removed & sent to pathology  The ovaries were examined and grossly normal with a right cystotomy performed a right simple cyst  All pedicle sites were examined and noted to be hemostatic  The vaginal cuff angles were secured with 0-Vicryl suture  Then the vagina was closed with 0-Vicryl suture in a running locking fashion      Following, decision to proceed with a right sacrospinous ligament fixation was made  Please see separate dictation for operative report for that part of the procedure  Edwards catheter was removed  A cystoscopy was performed  Urine jets were noted from bilaterally ureteral orifices and there was no evidence of suture material or trauma to the bladder lumen  The vagina was packed with 6 feet of vaginal packing  The sponge, needle and instrument count were correct x 2  The patient tolerated the procedure well  She was awakened from anesthesia and transferred to the recovery room in stable condition      Patient Disposition:  PACU     SIGNATURE: Kenize Casas MD  DATE: September 25, 2018  TIME: 11:29 AM

## 2018-09-25 NOTE — H&P
Patient is a 49yo female here for scheduled surgery for uterine prolapse with cystocele  She was seen and counseled in office 07/12/2018  No interval changes to the H&P since then      Vitals:    09/25/18 0737   BP: 135/65   Pulse: 94   Resp: 16   Temp: 99 3 °F (37 4 °C)   SpO2: 98%     Physical exam:  No acute distress  S1 S2 RRR  CTAB, no wheezing, rales, crackles  Abdomen soft nontender to palpation    A/P: 49yo here for TVH, anterior & posterior repair, sacrospinous ligament fixation   -to OR  -preoperative antibiotics, anesthesia

## 2018-09-25 NOTE — ANESTHESIA POSTPROCEDURE EVALUATION
Post-Op Assessment Note      CV Status:  Stable    Mental Status:  Alert and awake    Hydration Status:  Euvolemic    PONV Controlled:  Controlled    Airway Patency:  Patent  Airway: intubated    Post Op Vitals Reviewed: Yes          Staff: Anesthesiologist           BP     Temp     Pulse     Resp      SpO2

## 2018-09-25 NOTE — PROGRESS NOTES
Dr Anita Veras made aware of B/P 80/50's and pt  C/o feeling dizzy  Pt  Also able to void but had PVR bladder scan of approximately 418 mL  New orders given  After bolus of 1 liter B/P was 105/59  Pt  No longer c/o feeling dizzy  Pt  Able to void more but continues to have PVR bladder scan of approximately 462 mL  No new orders at this time  Continue to monitor

## 2018-09-25 NOTE — PLAN OF CARE
DISCHARGE PLANNING     Discharge to home or other facility with appropriate resources Progressing        GENITOURINARY - ADULT     Maintains or returns to baseline urinary function Progressing     Absence of urinary retention Progressing        Knowledge Deficit     Patient/family/caregiver demonstrates understanding of disease process, treatment plan, medications, and discharge instructions Progressing        PAIN - ADULT     Verbalizes/displays adequate comfort level or baseline comfort level Progressing        SAFETY ADULT     Patient will remain free of falls Progressing     Maintain or return to baseline ADL function Progressing     Maintain or return mobility status to optimal level Progressing        SKIN/TISSUE INTEGRITY - ADULT     Skin integrity remains intact Progressing     Incision(s), wounds(s) or drain site(s) healing without S/S of infection Progressing     Oral mucous membranes remain intact Progressing

## 2018-09-25 NOTE — OP NOTE
OPERATIVE REPORT  PATIENT NAME: Phuc Pierson    :  1970  MRN: [id number]  Pt Location: [facility] OR ROOM 04    SURGERY DATE: 2018    Surgeon(s) and Role:     * Susan Hernandez MD - Primary     * Florence Sierra MD - Assisting    Preop Diagnosis:  Complete uterovaginal prolapse [N81 3]    Post-Op Diagnosis Codes:     * Complete uterovaginal prolapse [N81 3]    Procedure(s) (LRB):  TOTAL VAG  HYSTERECTOMY (N/A)  FIXATION LIGAMENT SACROSPINOUS, RIGHT (Right)  CYSTOSCOPY (N/A)    Specimen(s):  ID Type Source Tests Collected by Time Destination   1 : Uterus and cervix Tissue Uterus TISSUE EXAM Susan Hernandez MD 2018 1049        Estimated Blood Loss:   100 mL    Drains:  [REMOVED] Urethral Catheter Non-latex; Double-lumen 16 Fr  (Removed)   Number of days: 0       Anesthesia Type:   General    Operative Indications:  Complete uterovaginal prolapse [N81 3]      Operative Findings:  Per findings on resident dictation    Complications:   None    Procedure and Technique:  The patient having had a vaginal hysterectomy performed now shows evidence of vaginal cuff distension 2/3 of the way down the vaginal canal   Decision was therefore to go ahead with a sacral spinous fixation to prevent further vaginal prolapse  Two Allis clamps were placed at the 4 and 8:00 position on the hymenal ring  Saline was used to hydro dissect the rectovaginal tissue  A midline incision was made 1st by Bovie cautery followed by scissors dissection  The incision was carried to thirds of the way up the posterior vaginal wall  Once this was performed in 2 Allis clamps were used to support this tissue, blunt fingertip dissection easily found the right ischial spinous area  The loose areolar tissue was easily cleaned away until the right coccygeal muscle was easily identified    The Capio delivery device was loaded with an absorbable suture and after carefully placing the Capio against the right coccygeal muscles so that it was a finger breath away from the right ischial spine, and placed squarely within the middle portion of the right coccygeal muscle, the delivery device placed the suture within the sacral spinous ligament area which was noted by tugging on the suture and at the same time tugging the patient  A rectal exam was next performed which found no disruption of the rectal area from the placed sacral spinous suture  The absorbable sacral spinous ligament suture was now brought out through a identified area of the vagina which would provide good support for the vaginal cuff with little tension  The sutures who was now set aside and the vaginal mucosal incision was closed using running 0 Vicryl suture  The absorbable sacral spinous ligament sutures were now tied and the vaginal tissue brought into good apposition of the right sacral spinous area  After the sutures were cut, the cystoscopy in remaining portions of the procedure can be find under the residence dictation     I was present for the entire procedure    Patient Disposition:  PACU     SIGNATURE: Dagoberto Latham MD  DATE: September 25, 2018  TIME: 3:17 PM

## 2018-09-26 VITALS
OXYGEN SATURATION: 97 % | TEMPERATURE: 99 F | HEIGHT: 64 IN | SYSTOLIC BLOOD PRESSURE: 109 MMHG | WEIGHT: 145 LBS | HEART RATE: 95 BPM | BODY MASS INDEX: 24.75 KG/M2 | RESPIRATION RATE: 16 BRPM | DIASTOLIC BLOOD PRESSURE: 67 MMHG

## 2018-09-26 LAB
ERYTHROCYTE [DISTWIDTH] IN BLOOD BY AUTOMATED COUNT: 13.2 % (ref 11.6–15.1)
HCT VFR BLD AUTO: 29 % (ref 34.8–46.1)
HGB BLD-MCNC: 9.1 G/DL (ref 11.5–15.4)
MCH RBC QN AUTO: 28.9 PG (ref 26.8–34.3)
MCHC RBC AUTO-ENTMCNC: 31.4 G/DL (ref 31.4–37.4)
MCV RBC AUTO: 92 FL (ref 82–98)
PLATELET # BLD AUTO: 184 THOUSANDS/UL (ref 149–390)
PMV BLD AUTO: 9.9 FL (ref 8.9–12.7)
RBC # BLD AUTO: 3.15 MILLION/UL (ref 3.81–5.12)
WBC # BLD AUTO: 13.17 THOUSAND/UL (ref 4.31–10.16)

## 2018-09-26 PROCEDURE — 85027 COMPLETE CBC AUTOMATED: CPT | Performed by: OBSTETRICS & GYNECOLOGY

## 2018-09-26 RX ORDER — SUMATRIPTAN 25 MG/1
25 TABLET, FILM COATED ORAL ONCE
Status: COMPLETED | OUTPATIENT
Start: 2018-09-26 | End: 2018-09-26

## 2018-09-26 RX ADMIN — SODIUM CHLORIDE, SODIUM LACTATE, POTASSIUM CHLORIDE, AND CALCIUM CHLORIDE 125 ML/HR: .6; .31; .03; .02 INJECTION, SOLUTION INTRAVENOUS at 02:00

## 2018-09-26 RX ADMIN — SUMATRIPTAN SUCCINATE 25 MG: 25 TABLET ORAL at 14:13

## 2018-09-26 RX ADMIN — IBUPROFEN 600 MG: 600 TABLET ORAL at 08:44

## 2018-09-26 NOTE — PLAN OF CARE
DISCHARGE PLANNING     Discharge to home or other facility with appropriate resources Progressing        GENITOURINARY - ADULT     Maintains or returns to baseline urinary function Progressing     Absence of urinary retention Progressing        Knowledge Deficit     Patient/family/caregiver demonstrates understanding of disease process, treatment plan, medications, and discharge instructions Progressing        PAIN - ADULT     Verbalizes/displays adequate comfort level or baseline comfort level Progressing        Potential for Falls     Patient will remain free of falls Progressing        Prexisting or High Potential for Compromised Skin Integrity     Skin integrity is maintained or improved Progressing        SAFETY ADULT     Patient will remain free of falls Progressing     Maintain or return to baseline ADL function Progressing     Maintain or return mobility status to optimal level Progressing        SKIN/TISSUE INTEGRITY - ADULT     Skin integrity remains intact Progressing     Incision(s), wounds(s) or drain site(s) healing without S/S of infection Progressing     Oral mucous membranes remain intact Progressing

## 2018-09-26 NOTE — NURSING NOTE
Pt  D/C home  D/C instructions reviewed with pt  Pt  sent with all personal items  Pt  Escorted to car by PCA

## 2018-09-26 NOTE — PROGRESS NOTES
Gyn Oncology Progress note   Levern Hatchet 51 y o  female MRN: [id number]  Unit/Bed#: [place] [address] 228-01 Encounter: [id number]    Levern Hatchet has no current complaints  Pain is present, but tolerables with medications  Patient is currently voiding  She is not ambulating  Patient is not currently passing flatus and has had no bowel movement  She is tolerating PO, and denies nausea or vomitting  Patient denies fever, chills, chest pain, shortness of breath, or calf tenderness  /62 (BP Location: Right arm)   Pulse 74   Temp 99 °F (37 2 °C) (Tympanic)   Resp 18   Ht 5' 4" (1 626 m)   Wt 65 8 kg (145 lb)   LMP 09/10/2018   SpO2 96%   Breastfeeding? No   BMI 24 89 kg/m²     I/O last 3 completed shifts: In: 4753 3 [I V :3783 3; IV Piggyback:970]  Out: 1700 [Urine:1600; Blood:100]  I/O this shift: In: 935 4 [I V :935 4]  Out: 2900 [Urine:2900]    Lab Results   Component Value Date    WBC 14 00 (H) 09/25/2018    HGB 9 9 (L) 09/25/2018    HCT 31 4 (L) 09/25/2018    MCV 92 09/25/2018     09/25/2018       Lab Results   Component Value Date    CALCIUM 8 8 09/25/2018     09/25/2018    K 4 1 09/25/2018    CO2 23 09/25/2018     09/25/2018    BUN 10 09/25/2018    CREATININE 0 76 09/25/2018       Lab Results   Component Value Date/Time    POCGLU 113 09/25/2018 07:56 AM       Physical Exam  Gen: AAOx3, NAD, comfortable in bed  CVS: S1S2+, RRR, no murmurs  Lungs: CTA b/l normal respiratory effort and rate  Abdomen: soft, non tender, incisions C/D/I  : vaginal packing in place, no evidence of vaginal bleeding  Extremities: SCDs on and on, non tender    A/P: 51 y o  POD#1 s/p total vaginal hysterectomy, right sacrospinous ligament fixation, and cystoscopy for uterine prolapse  1) Prolapse: s/p surgery   F/u final pathology  2) Postoperative care:  *Pain:motrin prn, percocet prn  *Encouraged incentive spirometry to reduce atelectasis and pneumonia risk  *Encouraged ambulation as tolerated  *Will remove vaginal packing today  3) Episode of hypotension: resolved  -BP's stable 100-11/60's now postoperatively  -Hgb stable 9 9 from 11 3 postop, f/u am CBC  4) Migraines:  -sumatriptan PRN  5) FEN: regular diet, will dc IVF's  6) DVT PPx: SCD's  7) Dispo: anticipate DC home today    Katrinka Moritz, MD - PGY-IV  9/26/2018  6:16 AM

## 2018-09-26 NOTE — PLAN OF CARE
DISCHARGE PLANNING     Discharge to home or other facility with appropriate resources Adequate for Discharge        GENITOURINARY - ADULT     Maintains or returns to baseline urinary function Adequate for Discharge     Absence of urinary retention Adequate for Discharge        Knowledge Deficit     Patient/family/caregiver demonstrates understanding of disease process, treatment plan, medications, and discharge instructions Adequate for Discharge        PAIN - ADULT     Verbalizes/displays adequate comfort level or baseline comfort level Adequate for Discharge        Potential for Falls     Patient will remain free of falls Adequate for Discharge        Prexisting or High Potential for Compromised Skin Integrity     Skin integrity is maintained or improved Adequate for Discharge        SAFETY ADULT     Patient will remain free of falls Adequate for Discharge     Maintain or return to baseline ADL function Adequate for Discharge     Maintain or return mobility status to optimal level Adequate for Discharge        SKIN/TISSUE INTEGRITY - ADULT     Skin integrity remains intact Adequate for Discharge     Incision(s), wounds(s) or drain site(s) healing without S/S of infection Adequate for Discharge     Oral mucous membranes remain intact Adequate for Discharge

## 2018-09-28 ENCOUNTER — TELEPHONE (OUTPATIENT)
Dept: OBGYN CLINIC | Facility: CLINIC | Age: 48
End: 2018-09-28

## 2018-09-28 DIAGNOSIS — G43.019 MIGRAINE WITHOUT AURA, INTRACTABLE: ICD-10-CM

## 2018-09-28 RX ORDER — RIZATRIPTAN BENZOATE 10 MG/1
TABLET, ORALLY DISINTEGRATING ORAL
Qty: 9 TABLET | Refills: 0 | Status: SHIPPED | OUTPATIENT
Start: 2018-09-28 | End: 2019-01-02 | Stop reason: SDUPTHER

## 2018-09-28 NOTE — TELEPHONE ENCOUNTER
Patient called, surgery on Tuesday  Questions if it is ok to resume taking her multivitamin and magnesium

## 2018-10-09 ENCOUNTER — OFFICE VISIT (OUTPATIENT)
Dept: OBGYN CLINIC | Facility: CLINIC | Age: 48
End: 2018-10-09

## 2018-10-09 VITALS — HEIGHT: 64 IN | BODY MASS INDEX: 24.79 KG/M2 | WEIGHT: 145.2 LBS

## 2018-10-09 DIAGNOSIS — Z09 FOLLOW-UP EXAMINATION AFTER GYNECOLOGICAL SURGERY: Primary | ICD-10-CM

## 2018-10-09 PROCEDURE — 99024 POSTOP FOLLOW-UP VISIT: CPT | Performed by: OBSTETRICS & GYNECOLOGY

## 2018-10-09 RX ORDER — UREA 10 %
500 LOTION (ML) TOPICAL 2 TIMES DAILY
COMMUNITY
End: 2018-10-17 | Stop reason: SDUPTHER

## 2018-10-09 RX ORDER — MULTIVITAMIN
1 CAPSULE ORAL DAILY
COMMUNITY

## 2018-10-09 NOTE — PROGRESS NOTES
Malik Becker is here today following an uneventful vaginal hysterectomy with sacral spinous vault fixation     She is doing well and offers no complaints or concerns at this time  Ht 5' 4" (1 626 m)   Wt 65 9 kg (145 lb 3 2 oz)   LMP 09/10/2018   BMI 24 92 kg/m²   Review of Systems:  Skin: No rashes or discolorations of any concern  RESP: Denies SOB, no cough  CV: Denies chest pain or palpitations  GI: Denies abdominal pain, heartburn, nausea, vomiting, changes in bowel habits  : Denies dysuria, frequency, CVA tenderness, incontinence and hematuria  Genitalia: Denies abnormal vaginal discharge, external lesions, rashes, pelvic pain, pressure, abnormal bleeding  Rectal:  Denies pain, bleeding, hemorrhoids,    Her incisions are healing well with no signs of disruption or infection  Her pelvic exam reveals a vaginal cuff that is intact, free of any tenderness, seroma, hematoma, or infection  The sacral spinous site is holding nicely with no swelling or tenderness       We reviewed the surgical findings and the pathology from the procedure  Recommendations are start short distance driving, light household and physical activity  Patient is to return 2 weeks for final follow-up

## 2018-10-10 DIAGNOSIS — F51.01 PRIMARY INSOMNIA: Primary | ICD-10-CM

## 2018-10-10 RX ORDER — ALPRAZOLAM 0.25 MG/1
0.25 TABLET ORAL
Qty: 30 TABLET | Refills: 0 | Status: SHIPPED | OUTPATIENT
Start: 2018-10-10 | End: 2019-10-14 | Stop reason: SDUPTHER

## 2018-10-17 DIAGNOSIS — G43.709 CHRONIC MIGRAINE WITHOUT AURA WITHOUT STATUS MIGRAINOSUS, NOT INTRACTABLE: Primary | ICD-10-CM

## 2018-10-17 RX ORDER — UREA 10 %
500 LOTION (ML) TOPICAL 2 TIMES DAILY
Qty: 60 TABLET | Refills: 3 | Status: SHIPPED | OUTPATIENT
Start: 2018-10-17 | End: 2019-10-14 | Stop reason: ALTCHOICE

## 2018-10-23 ENCOUNTER — OFFICE VISIT (OUTPATIENT)
Dept: OBGYN CLINIC | Facility: CLINIC | Age: 48
End: 2018-10-23

## 2018-10-23 VITALS
HEIGHT: 64 IN | WEIGHT: 147 LBS | BODY MASS INDEX: 25.1 KG/M2 | DIASTOLIC BLOOD PRESSURE: 84 MMHG | SYSTOLIC BLOOD PRESSURE: 120 MMHG

## 2018-10-23 DIAGNOSIS — Z09 FOLLOW-UP EXAMINATION AFTER GYNECOLOGICAL SURGERY: Primary | ICD-10-CM

## 2018-10-23 PROCEDURE — 99024 POSTOP FOLLOW-UP VISIT: CPT | Performed by: OBSTETRICS & GYNECOLOGY

## 2018-10-23 NOTE — PROGRESS NOTES
Kasey Ashraf is here today following an uneventful vaginal hysterectomy and sacral spinous ligament fixation  She is doing well and offers no complaints or concerns at this time  /84 (BP Location: Left arm, Patient Position: Sitting, Cuff Size: Standard)   Ht 5' 4" (1 626 m)   Wt 66 7 kg (147 lb)   BMI 25 23 kg/m²   Review of Systems:  Skin: No rashes or discolorations of any concern  RESP: Denies SOB, no cough  CV: Denies chest pain or palpitations  GI: Denies abdominal pain, heartburn, nausea, vomiting, changes in bowel habits  : Denies dysuria, frequency, CVA tenderness, incontinence and hematuria  Genitalia: Denies abnormal vaginal discharge, external lesions, rashes, pelvic pain, pressure, abnormal bleeding  Rectal:  Denies pain, bleeding, hemorrhoids,    Her incisions are healing well with no signs of disruption or infection  Her pelvic exam reveals the vaginal cuff continues to heal well with no signs of infection or mucosal separation  The area of the sacral spinous fixation is holding well and is nontender  A strand of suture had loosened and this extraneous strand was cut free  Recommendations are to resume all normal activity with the exception of intercourse and exceptionally heavy lifting, pushing or pulling  Those last 2 items she should wait an additional 2 weeks before resuming       Patient is to return in 1 year or as needed

## 2018-11-05 ENCOUNTER — TELEPHONE (OUTPATIENT)
Dept: OBGYN CLINIC | Facility: CLINIC | Age: 48
End: 2018-11-05

## 2018-11-06 ENCOUNTER — OFFICE VISIT (OUTPATIENT)
Dept: FAMILY MEDICINE CLINIC | Facility: CLINIC | Age: 48
End: 2018-11-06
Payer: COMMERCIAL

## 2018-11-06 VITALS
HEART RATE: 80 BPM | RESPIRATION RATE: 16 BRPM | HEIGHT: 64 IN | OXYGEN SATURATION: 98 % | WEIGHT: 147 LBS | SYSTOLIC BLOOD PRESSURE: 122 MMHG | TEMPERATURE: 98.5 F | DIASTOLIC BLOOD PRESSURE: 74 MMHG | BODY MASS INDEX: 25.1 KG/M2

## 2018-11-06 DIAGNOSIS — Z00.00 ENCOUNTER FOR WELL ADULT EXAM WITHOUT ABNORMAL FINDINGS: ICD-10-CM

## 2018-11-06 DIAGNOSIS — Z12.11 COLON CANCER SCREENING: ICD-10-CM

## 2018-11-06 DIAGNOSIS — D50.9 IRON DEFICIENCY ANEMIA, UNSPECIFIED IRON DEFICIENCY ANEMIA TYPE: ICD-10-CM

## 2018-11-06 DIAGNOSIS — R30.9 PAINFUL URINATION: Primary | ICD-10-CM

## 2018-11-06 DIAGNOSIS — R31.29 OTHER MICROSCOPIC HEMATURIA: ICD-10-CM

## 2018-11-06 PROBLEM — N81.3 COMPLETE UTEROVAGINAL PROLAPSE: Status: RESOLVED | Noted: 2018-07-16 | Resolved: 2018-11-06

## 2018-11-06 PROBLEM — D64.9 ANEMIA: Status: ACTIVE | Noted: 2018-11-06

## 2018-11-06 LAB
SL AMB  POCT GLUCOSE, UA: ABNORMAL
SL AMB LEUKOCYTE ESTERASE,UA: ABNORMAL
SL AMB POCT BILIRUBIN,UA: ABNORMAL
SL AMB POCT BLOOD,UA: ABNORMAL
SL AMB POCT CLARITY,UA: ABNORMAL
SL AMB POCT COLOR,UA: YELLOW
SL AMB POCT KETONES,UA: ABNORMAL
SL AMB POCT NITRITE,UA: ABNORMAL
SL AMB POCT PH,UA: 5
SL AMB POCT SPECIFIC GRAVITY,UA: 1.01
SL AMB POCT URINE PROTEIN: ABNORMAL
SL AMB POCT UROBILINOGEN: 0.2

## 2018-11-06 PROCEDURE — 81002 URINALYSIS NONAUTO W/O SCOPE: CPT | Performed by: FAMILY MEDICINE

## 2018-11-06 PROCEDURE — 99396 PREV VISIT EST AGE 40-64: CPT | Performed by: FAMILY MEDICINE

## 2018-11-06 RX ORDER — PREDNISONE 20 MG/1
40 TABLET ORAL
Refills: 0 | COMMUNITY
Start: 2018-11-03 | End: 2018-11-06 | Stop reason: ALTCHOICE

## 2018-11-06 NOTE — PROGRESS NOTES
Caryl Butt was seen today for annual exam     Diagnoses and all orders for this visit:    Painful urination  -     CBC and differential; Future  -     Iron; Future  -     Ambulatory referral to Gastroenterology  -     POCT urine dip  -     Urine culture    Encounter for well adult exam without abnormal findings    Iron deficiency anemia, unspecified iron deficiency anemia type  -     CBC and differential; Future  -     Iron; Future    Colon cancer screening  -     Ambulatory referral to Gastroenterology    Other microscopic hematuria  -     US kidney and bladder; Future      Patient Counseling:  --Nutrition: Stressed importance of moderation in sodium/caffeine intake, saturated fat and cholesterol, caloric balance, sufficient intake of fresh fruits, vegetables, fiber, calcium, iron, and 1 mg of folate supplement per day   --Discussed  calcium supplement, and the daily use of baby aspirin  --Exercise: Stressed the importance of regular exercise  --Substance Abuse: Discussed cessation/primary prevention of tobacco, alcohol, or other drug use; driving or other dangerous activities under the influence; availability of treatment for abuse  --Sexuality: Discussed sexually transmitted diseases, partner selection, use of condoms, avoidance of unintended pregnancy  and contraceptive alternatives  --Injury prevention: Discussed safety belts, safety helmets, smoke detector, smoking near bedding or upholstery  --Dental health: Discussed importance of regular tooth brushing, flossing, and dental visits  --Immunizations reviewed  --Discussed benefits of screening colonoscopy    Chief Complaint   Patient presents with    Annual Exam       HPI    Patient here for a comprehensive physical exam The patient reports dysuria 2 days ago, no frequency or urgency     Do you take any herbs or supplements that were not prescribed by a doctor? no   Are you taking calcium supplements?  no   Are you taking aspirin daily? no  How often do you exercise? Diet?  2-3 servings fruits and vegetables   Dental visit:   Every 6 months     Vision test: wears contacts- this year    Colonoscopy:  Never had one done      History:  LMP: hysterectomy in 2018    Last pap date: 2018  Abnormal pap? no  : 3  Para: 3          History   Sexual Activity    Sexual activity: Not Currently    Birth control/ protection: None             Review of Systems   Constitutional: Negative  HENT: Negative  Eyes: Negative  Respiratory: Negative  Cardiovascular: Negative  Gastrointestinal: Negative  Endocrine: Negative  Genitourinary: Negative  Musculoskeletal: Negative  Skin: Negative  Allergic/Immunologic: Negative  Neurological: Negative  Hematological: Negative  Psychiatric/Behavioral: Negative  Family History   Problem Relation Age of Onset    Kidney failure Father         acute per allscripts    Diabetes type II Father        Past Medical History:   Diagnosis Date    Abnormal Pap smear of cervix     Anemia     Anxiety     Complete uterovaginal prolapse     Constipation     HPV (human papilloma virus) infection     Migraines     PONV (postoperative nausea and vomiting)     Seasonal allergies     Tinnitus     Wears contact lenses          Social History     Social History    Marital status: Single     Spouse name: N/A    Number of children: 3    Years of education: N/A     Occupational History    Not on file       Social History Main Topics    Smoking status: Never Smoker    Smokeless tobacco: Never Used    Alcohol use No    Drug use: No    Sexual activity: Not Currently     Birth control/ protection: None     Other Topics Concern    Not on file     Social History Narrative    Caffeine use    Uses safety equipment seatbelts       Current Outpatient Prescriptions on File Prior to Visit   Medication Sig Dispense Refill    ALPRAZolam (XANAX) 0 25 mg tablet Take 1 tablet (0 25 mg total) by mouth daily at bedtime as needed for anxiety 30 tablet 0    Ascorbic Acid (VITAMIN C PO) Take 1,500 mg by mouth daily      b complex vitamins capsule Take 3 capsules by mouth daily 3 gummies daily      ibuprofen (MOTRIN) 800 mg tablet ibuprofen 800 mg tablet  every 6 hrs prn      magnesium gluconate (MAGONATE) 500 mg tablet Take 1 tablet (500 mg total) by mouth 2 (two) times a day 60 tablet 3    Multiple Vitamin (MULTIVITAMIN) capsule Take 1 capsule by mouth daily      rizatriptan (MAXALT-MLT) 10 MG disintegrating tablet Dissolve 1 tablet by mouth once as needed for migraine for up to 1 dose, may repeat in 2 hours if needed 9 tablet 0     No current facility-administered medications on file prior to visit  Allergies   Allergen Reactions    Latex Rash    Sulfamethoxazole-Trimethoprim Rash         Vitals:    11/06/18 1553   BP: 122/74   BP Location: Left arm   Patient Position: Sitting   Cuff Size: Standard   Pulse: 80   Resp: 16   Temp: 98 5 °F (36 9 °C)   SpO2: 98%   Weight: 66 7 kg (147 lb)   Height: 5' 4" (1 626 m)         Physical Exam   Constitutional: She is oriented to person, place, and time  Vital signs are normal  She appears well-developed and well-nourished  HENT:   Head: Normocephalic and atraumatic  Nose: Nose normal    Mouth/Throat: Oropharynx is clear and moist    Eyes: Pupils are equal, round, and reactive to light  Neck: Normal range of motion  Neck supple  No thyromegaly present  Cardiovascular: Normal rate and regular rhythm  No murmur heard  Pulmonary/Chest: Effort normal and breath sounds normal    Abdominal: Soft  Bowel sounds are normal    Musculoskeletal: Normal range of motion  She exhibits no edema or deformity  Neurological: She is alert and oriented to person, place, and time  She has normal reflexes  Skin: Skin is warm  No rash noted  No erythema  Psychiatric: She has a normal mood and affect   Her behavior is normal        Recent Results (from the past 336 hour(s))   POCT urine dip    Collection Time: 11/06/18  4:37 PM   Result Value Ref Range    LEUKOCYTE ESTERASE,UA neg     NITRITE,UA neg     SL AMB POCT UROBILINOGEN 0 2     POCT URINE PROTEIN neg      PH,UA 5 0     BLOOD,UA large +++     SPECIFIC GRAVITY,UA 1 015     KETONES,UA neg     BILIRUBIN,UA neg     GLUCOSE, UA neg      COLOR,UA yellow     CLARITY,UA hazy

## 2018-11-07 ENCOUNTER — HOSPITAL ENCOUNTER (OUTPATIENT)
Dept: RADIOLOGY | Age: 48
Discharge: HOME | End: 2018-11-07
Payer: COMMERCIAL

## 2018-11-07 DIAGNOSIS — R31.29 OTHER MICROSCOPIC HEMATURIA: ICD-10-CM

## 2018-11-07 PROCEDURE — 76770 US EXAM ABDO BACK WALL COMP: CPT

## 2018-11-28 ENCOUNTER — TELEPHONE (OUTPATIENT)
Dept: FAMILY MEDICINE CLINIC | Facility: CLINIC | Age: 48
End: 2018-11-28

## 2018-11-28 NOTE — TELEPHONE ENCOUNTER
PATIENT WAS URGENT CARE AND THEN HAD OV WITH YOU ON 11-6-18   SYMPTOMS ARE RUNNING, NOSE  LOST OF VOICE , CONGESTION , NO FEVER   KIKI STATED SHE WAS GIVEN  A STEROID AN AMOXICILLIN AND HAS COMPLETED THE COURSE FOR BOTH    SHE IS STILL EXPERIENCING SAME SYMPTOMS   IS THERE SOMETHING ELSE YOU COULD SENT TO HELP IMPROVE HER SYMPTOMS OR IS ANOTHER OV REQUIRED  PLEASE ADVISE  OFFICE [phone]  EXT [id number] BEST CONTACT NUMBER TODAY

## 2018-11-28 NOTE — TELEPHONE ENCOUNTER
I want her to take flonase nasal spray  Delsym for cough  It takes a couple of week for symptoms to go away  She can also do saline nasal spray as well   I want to see her next week if she is still feeling bad

## 2018-11-29 ENCOUNTER — TELEPHONE (OUTPATIENT)
Dept: FAMILY MEDICINE CLINIC | Facility: CLINIC | Age: 48
End: 2018-11-29

## 2018-11-29 DIAGNOSIS — R05.9 COUGH: Primary | ICD-10-CM

## 2018-11-29 RX ORDER — BENZONATATE 100 MG/1
100 CAPSULE ORAL 3 TIMES DAILY PRN
Qty: 20 CAPSULE | Refills: 0 | Status: SHIPPED | OUTPATIENT
Start: 2018-11-29 | End: 2018-12-10 | Stop reason: SDUPTHER

## 2018-11-29 NOTE — TELEPHONE ENCOUNTER
Patient called and stated Delsym doesn't work and wanted to know if you can send in a script for tessalon perles      Pharmacy is [facility], Please advise

## 2018-12-10 DIAGNOSIS — R05.9 COUGH: ICD-10-CM

## 2018-12-10 RX ORDER — BENZONATATE 100 MG/1
100 CAPSULE ORAL 3 TIMES DAILY PRN
Qty: 20 CAPSULE | Refills: 0 | Status: SHIPPED | OUTPATIENT
Start: 2018-12-10 | End: 2019-09-19 | Stop reason: SDUPTHER

## 2019-01-02 DIAGNOSIS — G43.019 MIGRAINE WITHOUT AURA, INTRACTABLE: Primary | ICD-10-CM

## 2019-01-02 RX ORDER — RIZATRIPTAN BENZOATE 10 MG/1
TABLET, ORALLY DISINTEGRATING ORAL
Qty: 9 TABLET | Refills: 0 | Status: SHIPPED | OUTPATIENT
Start: 2019-01-02 | End: 2019-03-12 | Stop reason: SDUPTHER

## 2019-01-15 ENCOUNTER — TELEPHONE (OUTPATIENT)
Dept: FAMILY MEDICINE CLINIC | Facility: CLINIC | Age: 49
End: 2019-01-15

## 2019-01-15 NOTE — TELEPHONE ENCOUNTER
Patient was inquiring about the dermatologist you recommended for her the last time she was here and was trying to get their information  Upon looking in her chart I could not locate a doctor's order to be seen by one  She said it was in regards to a skin tag? Please advise

## 2019-01-16 ENCOUNTER — TRANSCRIBE ORDERS (OUTPATIENT)
Dept: ADMINISTRATIVE | Facility: HOSPITAL | Age: 49
End: 2019-01-16

## 2019-01-16 DIAGNOSIS — R42 VERTIGO, LATE EFFECT OF CEREBROVASCULAR DISEASE: Primary | ICD-10-CM

## 2019-01-16 DIAGNOSIS — I69.998 VERTIGO, LATE EFFECT OF CEREBROVASCULAR DISEASE: Primary | ICD-10-CM

## 2019-02-03 ENCOUNTER — HOSPITAL ENCOUNTER (OUTPATIENT)
Dept: MRI IMAGING | Facility: HOSPITAL | Age: 49
Discharge: HOME | End: 2019-02-03
Payer: COMMERCIAL

## 2019-02-03 DIAGNOSIS — H93.13 TINNITUS OF BOTH EARS: ICD-10-CM

## 2019-02-03 PROCEDURE — A9585 GADOBUTROL INJECTION: HCPCS | Performed by: FAMILY MEDICINE

## 2019-02-03 PROCEDURE — 70553 MRI BRAIN STEM W/O & W/DYE: CPT

## 2019-02-03 RX ADMIN — GADOBUTROL 7 ML: 604.72 INJECTION INTRAVENOUS at 12:16

## 2019-02-05 ENCOUNTER — TELEPHONE (OUTPATIENT)
Dept: FAMILY MEDICINE CLINIC | Facility: CLINIC | Age: 49
End: 2019-02-05

## 2019-02-05 DIAGNOSIS — G43.709 CHRONIC MIGRAINE WITHOUT AURA WITHOUT STATUS MIGRAINOSUS, NOT INTRACTABLE: Primary | ICD-10-CM

## 2019-02-05 RX ORDER — RIZATRIPTAN BENZOATE 10 MG/1
TABLET, ORALLY DISINTEGRATING ORAL
COMMUNITY
Start: 2019-01-02 | End: 2019-03-12 | Stop reason: ALTCHOICE

## 2019-02-05 NOTE — TELEPHONE ENCOUNTER
Patient called and stated she got an appointment with neurology on 3/12 put she wanted to know if you can give her a call she has some questions about her results of the MRI   Please advise      Phone Number: [phone]

## 2019-02-07 ENCOUNTER — OFFICE VISIT (OUTPATIENT)
Dept: FAMILY MEDICINE CLINIC | Facility: CLINIC | Age: 49
End: 2019-02-07
Payer: COMMERCIAL

## 2019-02-07 VITALS
BODY MASS INDEX: 25.61 KG/M2 | TEMPERATURE: 98 F | WEIGHT: 150 LBS | DIASTOLIC BLOOD PRESSURE: 82 MMHG | SYSTOLIC BLOOD PRESSURE: 122 MMHG | HEIGHT: 64 IN | RESPIRATION RATE: 15 BRPM | HEART RATE: 88 BPM | OXYGEN SATURATION: 97 %

## 2019-02-07 DIAGNOSIS — J20.9 ACUTE BRONCHITIS, UNSPECIFIED ORGANISM: Primary | ICD-10-CM

## 2019-02-07 PROCEDURE — 99213 OFFICE O/P EST LOW 20 MIN: CPT | Performed by: FAMILY MEDICINE

## 2019-02-07 PROCEDURE — 3008F BODY MASS INDEX DOCD: CPT | Performed by: FAMILY MEDICINE

## 2019-02-07 RX ORDER — ALBUTEROL SULFATE 90 UG/1
2 AEROSOL, METERED RESPIRATORY (INHALATION) EVERY 6 HOURS PRN
Qty: 8.5 G | Refills: 0 | Status: SHIPPED | OUTPATIENT
Start: 2019-02-07 | End: 2019-10-14 | Stop reason: ALTCHOICE

## 2019-02-07 RX ORDER — AZITHROMYCIN 250 MG/1
TABLET, FILM COATED ORAL
Qty: 6 TABLET | Refills: 0 | Status: SHIPPED | OUTPATIENT
Start: 2019-02-07 | End: 2019-03-14 | Stop reason: SDUPTHER

## 2019-02-07 NOTE — PROGRESS NOTES
Assessment/Plan:         Diagnoses and all orders for this visit:    Acute bronchitis, unspecified organism  -     azithromycin (ZITHROMAX) 250 mg tablet; 2 tab x 1 day, 1 tab x 4 days  -     fluticasone (VERAMYST) 27 5 MCG/SPRAY nasal spray; 2 sprays each nostril daily  -     albuterol (PROAIR HFA) 90 mcg/act inhaler; Inhale 2 puffs every 6 (six) hours as needed for wheezing          Subjective:      Patient ID: Dominga Palomino is a 47 y o  female  URI    This is a new problem  The current episode started 1 to 4 weeks ago  The problem has been waxing and waning  There has been no fever  Associated symptoms include coughing  The following portions of the patient's history were reviewed and updated as appropriate: allergies, current medications, past family history, past medical history, past social history, past surgical history and problem list     Review of Systems   Constitutional: Negative  HENT: Negative  Respiratory: Positive for cough  Negative for apnea, choking, chest tightness and shortness of breath  Objective:      /82 (BP Location: Left arm, Patient Position: Sitting, Cuff Size: Standard)   Pulse 88   Temp 98 °F (36 7 °C)   Resp 15   Ht 5' 4" (1 626 m)   Wt 68 kg (150 lb)   SpO2 97%   BMI 25 75 kg/m²          Physical Exam   Constitutional: She appears well-developed and well-nourished  HENT:   Head: Normocephalic and atraumatic  Mouth/Throat: No oropharyngeal exudate  Eyes: Pupils are equal, round, and reactive to light  EOM are normal    Neck: No tracheal deviation present  No thyromegaly present  Cardiovascular: Normal rate and regular rhythm  Pulmonary/Chest: No respiratory distress  She has no wheezes  She has no rales  Psychiatric: She has a normal mood and affect   Her behavior is normal

## 2019-03-12 ENCOUNTER — OFFICE VISIT (OUTPATIENT)
Dept: NEUROLOGY | Facility: CLINIC | Age: 49
End: 2019-03-12
Payer: COMMERCIAL

## 2019-03-12 VITALS
HEIGHT: 64 IN | BODY MASS INDEX: 26.12 KG/M2 | DIASTOLIC BLOOD PRESSURE: 70 MMHG | HEART RATE: 79 BPM | RESPIRATION RATE: 16 BRPM | WEIGHT: 153 LBS | SYSTOLIC BLOOD PRESSURE: 120 MMHG

## 2019-03-12 DIAGNOSIS — G37.9 CNS DEMYELINATION (HCC): ICD-10-CM

## 2019-03-12 DIAGNOSIS — G43.019 MIGRAINE WITHOUT AURA, INTRACTABLE: Primary | ICD-10-CM

## 2019-03-12 DIAGNOSIS — I95.1 ORTHOSTATIC HYPOTENSION: ICD-10-CM

## 2019-03-12 DIAGNOSIS — R20.8 ALLODYNIA: ICD-10-CM

## 2019-03-12 DIAGNOSIS — G44.019 EPISODIC CLUSTER HEADACHE, NOT INTRACTABLE: ICD-10-CM

## 2019-03-12 DIAGNOSIS — R55 NEAR SYNCOPE: ICD-10-CM

## 2019-03-12 DIAGNOSIS — G43.019 MIGRAINE WITHOUT AURA, INTRACTABLE: ICD-10-CM

## 2019-03-12 PROCEDURE — 99245 OFF/OP CONSLTJ NEW/EST HI 55: CPT | Performed by: PSYCHIATRY & NEUROLOGY

## 2019-03-12 PROCEDURE — 96372 THER/PROPH/DIAG INJ SC/IM: CPT | Performed by: PSYCHIATRY & NEUROLOGY

## 2019-03-12 RX ORDER — RIZATRIPTAN BENZOATE 10 MG/1
TABLET, ORALLY DISINTEGRATING ORAL
Qty: 9 TABLET | Refills: 0 | Status: SHIPPED | OUTPATIENT
Start: 2019-03-12 | End: 2019-05-30 | Stop reason: SDUPTHER

## 2019-03-12 RX ORDER — KETOROLAC TROMETHAMINE 30 MG/ML
60 INJECTION, SOLUTION INTRAMUSCULAR; INTRAVENOUS ONCE
Status: COMPLETED | OUTPATIENT
Start: 2019-03-12 | End: 2019-03-12

## 2019-03-12 RX ORDER — KETOROLAC TROMETHAMINE 30 MG/ML
60 INJECTION, SOLUTION INTRAMUSCULAR; INTRAVENOUS ONCE
Status: DISCONTINUED | OUTPATIENT
Start: 2019-03-12 | End: 2019-03-17

## 2019-03-12 RX ORDER — DIVALPROEX SODIUM 250 MG/1
250 TABLET, EXTENDED RELEASE ORAL DAILY
Qty: 30 TABLET | Refills: 4 | Status: SHIPPED | OUTPATIENT
Start: 2019-03-12 | End: 2019-10-14 | Stop reason: ALTCHOICE

## 2019-03-12 RX ORDER — KETOROLAC TROMETHAMINE 30 MG/ML
60 INJECTION, SOLUTION INTRAMUSCULAR; INTRAVENOUS ONCE
Status: DISCONTINUED | OUTPATIENT
Start: 2019-03-12 | End: 2019-03-12

## 2019-03-12 RX ADMIN — KETOROLAC TROMETHAMINE 60 MG: 30 INJECTION, SOLUTION INTRAMUSCULAR; INTRAVENOUS at 16:54

## 2019-03-12 NOTE — LETTER
March 12, 2019     Patient: Noris Feldman   YOB: 1970   Date of Visit: 3/12/2019       To Whom it May Concern:    Zachariah Mg is under my professional care  She was seen in my office on 3/12/2019  She may return to work on 3/13/2019  If you have any questions or concerns, please don't hesitate to call           Sincerely,          Ambika Sofia MD        CC: No Recipients
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
## 2019-03-14 DIAGNOSIS — J20.9 ACUTE BRONCHITIS, UNSPECIFIED ORGANISM: ICD-10-CM

## 2019-03-14 RX ORDER — AZITHROMYCIN 250 MG/1
TABLET, FILM COATED ORAL
Qty: 6 TABLET | Refills: 0 | Status: SHIPPED | OUTPATIENT
Start: 2019-03-14 | End: 2019-03-19

## 2019-03-15 ENCOUNTER — TELEPHONE (OUTPATIENT)
Dept: NEUROLOGY | Facility: CLINIC | Age: 49
End: 2019-03-15

## 2019-03-16 NOTE — TELEPHONE ENCOUNTER
Sathish - please review the chart - patient had ketorolac 60 mg and compazine 10 mg administered, but likely drugs were not properly documented and I can not sign the chart  I had received email from [place] about chart has not been signed, but I see no actions taken

## 2019-03-19 NOTE — TELEPHONE ENCOUNTER
I looked at the notes, it was documented correctly  Please discontinue one of the toradol and compazine orders  It appears there are 2 each  This is why it is showing meds have not been administered when they have been

## 2019-03-21 NOTE — TELEPHONE ENCOUNTER
those medications are not visible any longer in my chart - can not discontinue from my side  Would it be possible you can adjust/discontinue from your view of my note?  Thank you

## 2019-03-26 ENCOUNTER — TELEPHONE (OUTPATIENT)
Dept: FAMILY MEDICINE CLINIC | Facility: CLINIC | Age: 49
End: 2019-03-26

## 2019-03-26 NOTE — TELEPHONE ENCOUNTER
Patient called and stated that she was on antibiotics twice, but still has the mucus and drainage, she can not take musinex Dm as it makes her heart race, and benadryl makes her drowsy  She wanted to know if Sharon Scales would work or if there was something else OTC she can take   Please advise

## 2019-03-31 ENCOUNTER — HOSPITAL ENCOUNTER (OUTPATIENT)
Dept: MRI IMAGING | Facility: HOSPITAL | Age: 49
Discharge: HOME | End: 2019-03-31
Attending: PSYCHIATRY & NEUROLOGY
Payer: COMMERCIAL

## 2019-03-31 DIAGNOSIS — G43.019 MIGRAINE WITHOUT AURA, INTRACTABLE: ICD-10-CM

## 2019-03-31 DIAGNOSIS — G44.019 EPISODIC CLUSTER HEADACHE, NOT INTRACTABLE: ICD-10-CM

## 2019-03-31 PROCEDURE — 70544 MR ANGIOGRAPHY HEAD W/O DYE: CPT

## 2019-04-01 ENCOUNTER — TELEPHONE (OUTPATIENT)
Dept: OBGYN CLINIC | Facility: CLINIC | Age: 49
End: 2019-04-01

## 2019-05-30 DIAGNOSIS — G43.019 MIGRAINE WITHOUT AURA, INTRACTABLE: ICD-10-CM

## 2019-05-30 RX ORDER — RIZATRIPTAN BENZOATE 10 MG/1
TABLET, ORALLY DISINTEGRATING ORAL
Qty: 9 TABLET | Refills: 0 | Status: SHIPPED | OUTPATIENT
Start: 2019-05-30 | End: 2019-07-02 | Stop reason: SDUPTHER

## 2019-07-02 DIAGNOSIS — G43.019 MIGRAINE WITHOUT AURA, INTRACTABLE: ICD-10-CM

## 2019-07-02 RX ORDER — RIZATRIPTAN BENZOATE 10 MG/1
TABLET, ORALLY DISINTEGRATING ORAL
Qty: 9 TABLET | Refills: 0 | Status: SHIPPED | OUTPATIENT
Start: 2019-07-02 | End: 2019-11-21 | Stop reason: SDUPTHER

## 2019-07-26 ENCOUNTER — TRANSCRIBE ORDERS (OUTPATIENT)
Dept: LAB | Facility: CLINIC | Age: 49
End: 2019-07-26

## 2019-07-26 ENCOUNTER — APPOINTMENT (OUTPATIENT)
Dept: LAB | Facility: CLINIC | Age: 49
End: 2019-07-26
Payer: COMMERCIAL

## 2019-07-26 DIAGNOSIS — E78.00 PURE HYPERCHOLESTEROLEMIA: ICD-10-CM

## 2019-07-26 DIAGNOSIS — R53.83 OTHER FATIGUE: ICD-10-CM

## 2019-07-26 DIAGNOSIS — I10 ESSENTIAL HYPERTENSION, MALIGNANT: ICD-10-CM

## 2019-07-26 DIAGNOSIS — F45.8 ANXIETY HYPERVENTILATION: Primary | ICD-10-CM

## 2019-07-26 DIAGNOSIS — F41.9 ANXIETY HYPERVENTILATION: ICD-10-CM

## 2019-07-26 DIAGNOSIS — G37.9 CNS DEMYELINATION (HCC): ICD-10-CM

## 2019-07-26 DIAGNOSIS — F41.9 ANXIETY HYPERVENTILATION: Primary | ICD-10-CM

## 2019-07-26 DIAGNOSIS — F45.8 ANXIETY HYPERVENTILATION: ICD-10-CM

## 2019-07-26 LAB
ALBUMIN SERPL BCP-MCNC: 3.6 G/DL (ref 3.5–5)
ALP SERPL-CCNC: 79 U/L (ref 46–116)
ALT SERPL W P-5'-P-CCNC: 20 U/L (ref 12–78)
ANION GAP SERPL CALCULATED.3IONS-SCNC: 7 MMOL/L (ref 4–13)
AST SERPL W P-5'-P-CCNC: 8 U/L (ref 5–45)
BACTERIA UR QL AUTO: ABNORMAL /HPF
BASOPHILS # BLD AUTO: 0.03 THOUSANDS/ΜL (ref 0–0.1)
BASOPHILS NFR BLD AUTO: 1 % (ref 0–1)
BILIRUB SERPL-MCNC: 0.35 MG/DL (ref 0.2–1)
BILIRUB UR QL STRIP: NEGATIVE
BUN SERPL-MCNC: 11 MG/DL (ref 5–25)
CALCIUM SERPL-MCNC: 9.8 MG/DL (ref 8.3–10.1)
CHLORIDE SERPL-SCNC: 109 MMOL/L (ref 100–108)
CHOLEST SERPL-MCNC: 148 MG/DL (ref 50–200)
CLARITY UR: ABNORMAL
CO2 SERPL-SCNC: 26 MMOL/L (ref 21–32)
COLOR UR: YELLOW
CREAT SERPL-MCNC: 0.63 MG/DL (ref 0.6–1.3)
EOSINOPHIL # BLD AUTO: 0.12 THOUSAND/ΜL (ref 0–0.61)
EOSINOPHIL NFR BLD AUTO: 2 % (ref 0–6)
ERYTHROCYTE [DISTWIDTH] IN BLOOD BY AUTOMATED COUNT: 13.2 % (ref 11.6–15.1)
GFR SERPL CREATININE-BSD FRML MDRD: 105 ML/MIN/1.73SQ M
GLUCOSE P FAST SERPL-MCNC: 92 MG/DL (ref 65–99)
GLUCOSE UR STRIP-MCNC: NEGATIVE MG/DL
HCT VFR BLD AUTO: 42.1 % (ref 34.8–46.1)
HDLC SERPL-MCNC: 44 MG/DL (ref 40–60)
HGB BLD-MCNC: 13.3 G/DL (ref 11.5–15.4)
HGB UR QL STRIP.AUTO: NEGATIVE
IMM GRANULOCYTES # BLD AUTO: 0.01 THOUSAND/UL (ref 0–0.2)
IMM GRANULOCYTES NFR BLD AUTO: 0 % (ref 0–2)
KETONES UR STRIP-MCNC: NEGATIVE MG/DL
LDLC SERPL CALC-MCNC: 70 MG/DL (ref 0–100)
LEUKOCYTE ESTERASE UR QL STRIP: NEGATIVE
LYMPHOCYTES # BLD AUTO: 1.87 THOUSANDS/ΜL (ref 0.6–4.47)
LYMPHOCYTES NFR BLD AUTO: 30 % (ref 14–44)
MCH RBC QN AUTO: 29.2 PG (ref 26.8–34.3)
MCHC RBC AUTO-ENTMCNC: 31.6 G/DL (ref 31.4–37.4)
MCV RBC AUTO: 92 FL (ref 82–98)
MONOCYTES # BLD AUTO: 0.38 THOUSAND/ΜL (ref 0.17–1.22)
MONOCYTES NFR BLD AUTO: 6 % (ref 4–12)
MUCOUS THREADS UR QL AUTO: ABNORMAL
NEUTROPHILS # BLD AUTO: 3.79 THOUSANDS/ΜL (ref 1.85–7.62)
NEUTS SEG NFR BLD AUTO: 61 % (ref 43–75)
NITRITE UR QL STRIP: NEGATIVE
NON-SQ EPI CELLS URNS QL MICRO: ABNORMAL /HPF
NONHDLC SERPL-MCNC: 104 MG/DL
NRBC BLD AUTO-RTO: 0 /100 WBCS
PH UR STRIP.AUTO: 6.5 [PH]
PLATELET # BLD AUTO: 190 THOUSANDS/UL (ref 149–390)
PMV BLD AUTO: 10.9 FL (ref 8.9–12.7)
POTASSIUM SERPL-SCNC: 4.2 MMOL/L (ref 3.5–5.3)
PROT SERPL-MCNC: 6.8 G/DL (ref 6.4–8.2)
PROT UR STRIP-MCNC: NEGATIVE MG/DL
RBC # BLD AUTO: 4.56 MILLION/UL (ref 3.81–5.12)
RBC #/AREA URNS AUTO: ABNORMAL /HPF
SODIUM SERPL-SCNC: 142 MMOL/L (ref 136–145)
SP GR UR STRIP.AUTO: 1.02 (ref 1–1.03)
TRIGL SERPL-MCNC: 169 MG/DL
TSH SERPL DL<=0.05 MIU/L-ACNC: 2.19 UIU/ML (ref 0.36–3.74)
UROBILINOGEN UR QL STRIP.AUTO: 0.2 E.U./DL
WBC # BLD AUTO: 6.2 THOUSAND/UL (ref 4.31–10.16)
WBC #/AREA URNS AUTO: ABNORMAL /HPF

## 2019-07-26 PROCEDURE — 36415 COLL VENOUS BLD VENIPUNCTURE: CPT | Performed by: FAMILY MEDICINE

## 2019-07-26 PROCEDURE — 85025 COMPLETE CBC W/AUTO DIFF WBC: CPT

## 2019-07-26 PROCEDURE — 80061 LIPID PANEL: CPT

## 2019-07-26 PROCEDURE — 80053 COMPREHEN METABOLIC PANEL: CPT | Performed by: FAMILY MEDICINE

## 2019-07-26 PROCEDURE — 81001 URINALYSIS AUTO W/SCOPE: CPT

## 2019-07-26 PROCEDURE — 84443 ASSAY THYROID STIM HORMONE: CPT

## 2019-08-06 ENCOUNTER — HOSPITAL ENCOUNTER (OUTPATIENT)
Dept: MRI IMAGING | Facility: HOSPITAL | Age: 49
Discharge: HOME | End: 2019-08-06
Attending: PSYCHIATRY & NEUROLOGY
Payer: COMMERCIAL

## 2019-08-06 DIAGNOSIS — G37.9 CNS DEMYELINATION (HCC): ICD-10-CM

## 2019-08-06 PROCEDURE — 72156 MRI NECK SPINE W/O & W/DYE: CPT

## 2019-08-06 PROCEDURE — A9585 GADOBUTROL INJECTION: HCPCS | Performed by: PSYCHIATRY & NEUROLOGY

## 2019-08-06 PROCEDURE — 72157 MRI CHEST SPINE W/O & W/DYE: CPT

## 2019-08-06 RX ADMIN — GADOBUTROL 6 ML: 604.72 INJECTION INTRAVENOUS at 13:56

## 2019-09-19 DIAGNOSIS — R05.9 COUGH: ICD-10-CM

## 2019-09-19 RX ORDER — BENZONATATE 100 MG/1
100 CAPSULE ORAL 3 TIMES DAILY PRN
Qty: 20 CAPSULE | Refills: 0 | Status: SHIPPED | OUTPATIENT
Start: 2019-09-19 | End: 2020-01-23

## 2019-10-11 DIAGNOSIS — Z12.11 SPECIAL SCREENING FOR MALIGNANT NEOPLASMS, COLON: Primary | ICD-10-CM

## 2019-10-14 ENCOUNTER — OFFICE VISIT (OUTPATIENT)
Dept: FAMILY MEDICINE CLINIC | Facility: CLINIC | Age: 49
End: 2019-10-14
Payer: COMMERCIAL

## 2019-10-14 VITALS
TEMPERATURE: 99.1 F | BODY MASS INDEX: 21.69 KG/M2 | WEIGHT: 135 LBS | DIASTOLIC BLOOD PRESSURE: 62 MMHG | HEIGHT: 66 IN | HEART RATE: 78 BPM | SYSTOLIC BLOOD PRESSURE: 108 MMHG | RESPIRATION RATE: 16 BRPM

## 2019-10-14 DIAGNOSIS — J06.9 URI, ACUTE: ICD-10-CM

## 2019-10-14 DIAGNOSIS — Z12.11 SCREENING FOR MALIGNANT NEOPLASM OF COLON: ICD-10-CM

## 2019-10-14 DIAGNOSIS — Z76.89 ENCOUNTER TO ESTABLISH CARE: Primary | ICD-10-CM

## 2019-10-14 DIAGNOSIS — F51.01 PRIMARY INSOMNIA: ICD-10-CM

## 2019-10-14 PROCEDURE — 99214 OFFICE O/P EST MOD 30 MIN: CPT | Performed by: NURSE PRACTITIONER

## 2019-10-14 PROCEDURE — 3008F BODY MASS INDEX DOCD: CPT | Performed by: NURSE PRACTITIONER

## 2019-10-14 RX ORDER — TOPIRAMATE 50 MG/1
TABLET, FILM COATED ORAL
COMMUNITY
Start: 2017-10-18 | End: 2020-01-23

## 2019-10-14 RX ORDER — AZITHROMYCIN 250 MG/1
TABLET, FILM COATED ORAL
Qty: 6 TABLET | Refills: 0 | Status: SHIPPED | OUTPATIENT
Start: 2019-10-14 | End: 2021-03-26 | Stop reason: SDUPTHER

## 2019-10-14 RX ORDER — ALPRAZOLAM 0.25 MG/1
0.25 TABLET ORAL
Qty: 30 TABLET | Refills: 0 | Status: SHIPPED | OUTPATIENT
Start: 2019-10-14 | End: 2019-12-06 | Stop reason: SDUPTHER

## 2019-10-14 RX ORDER — METHYLPREDNISOLONE 4 MG/1
TABLET ORAL
Qty: 21 EACH | Refills: 0 | Status: SHIPPED | OUTPATIENT
Start: 2019-10-14 | End: 2019-11-04 | Stop reason: SDUPTHER

## 2019-10-14 NOTE — PROGRESS NOTES
Assessment/Plan:     Diagnoses and all orders for this visit:    Encounter to establish care    URI, acute  -     azithromycin (ZITHROMAX) 250 mg tablet; Take 2 tabs on Day 1 than 1 tab Days 2-5  -     methylPREDNISolone 4 MG tablet therapy pack; Use as directed on package    Primary insomnia  -     ALPRAZolam (XANAX) 0 25 mg tablet; Take 1 tablet (0 25 mg total) by mouth daily at bedtime as needed for anxiety    Screening for malignant neoplasm of colon  -     Cologuard; Future    #1 Encounter to establish care  Patient was a patient of Dr Stacy Porter ([address]) and changing providers to one closer to her home  #2 Acute URI  Discussed with patient plan to treat with a course of azithromycin and a Medrol Dose pack  #3 Primary insomnia  Discussed with patient plan to renew alprazolam at this time but would prefer her to use other means to manage anxiety  #4 Screening for malignant neoplasm of colon  Discussed with patient plan to order Cologuard  Patient instructed to call if no improvement in 72 hours or symptoms worsen      Subjective:      Patient ID: Delores Breaux is a 47 y o  female  47 y  o female presenting to become establish to the practice and to be evaluate for cough that as been present for two months  Patient was previously seen by Dr Stacy Porter within the [address]  She is under a lot of stress at work which is causing her anxiety to be elevated  She is taking the alprazolam a few nights a week to sleep but she is not interested in starting on daily medication to manage the anxiety  She states that she was on medication in the past which seem to cause a marisela, so she stopped  She is a single mother of two boys so changing employment is not currently an option open to her  She reports that since using the alprazolam to sleep her migraine headaches have been resolved  General Health: The patient's describes his/her overall health as good    Health Maintenance:  She has a dental home and goes for regular dental visits  She denies any vision problems and does not wear glasses/contacts  She denies hearing loss  Immunization status: up to date  Lifestyle: She consumes a diverse and healthy diet without current weight concerns  She does not have a formal exercise program but attempting to get more exercise and starting a yoga program to better control stress/anxiety issues  She is a life long non-smoker and denies alcohol use and as never used street/recreational drugs        Screening:    Cardiovascular: Discussed risk and benefits; health diet, healthy weight and physical activity   -Patient had recent lab work performed on 07/26/19 with elevated triglycerides so discussed lifestyle modifications and will recheck in one year  Colorectal: Discussed risks and benefits; healthy high fiber diet  -Order placed for Cologuard with discuss on possible positive results leading to need for colonoscopy   Diabetes Mellitus: Discussed risks and benefits; healthy diet, healthy weight and increase physical activity     -Fasting blood glucose from 07/26/19 lab work was within normal limits   Breast Cancer: Discussed risks and benefits    -Discussed with patient that she was to have a repeat diagnostic right mammogram 6 months after mammogram performed 09/18/18  -Discussed with patient that she needs to schedule her mammograms  Reproductive:  Discussed risks and benefits of having a routine gynecological exams to monitor external and internal abnormalities  -Discussed with patient need for gynecological visits even after a hysterectomy      Family History   Problem Relation Age of Onset    Kidney failure Father         acute per allscripts    Diabetes type II Father      Social History     Socioeconomic History    Marital status: Single     Spouse name: Not on file    Number of children: 3    Years of education: Not on file    Highest education level: Not on file Occupational History    Not on file   Social Needs    Financial resource strain: Not on file    Food insecurity:     Worry: Not on file     Inability: Not on file    Transportation needs:     Medical: Not on file     Non-medical: Not on file   Tobacco Use    Smoking status: Never Smoker    Smokeless tobacco: Never Used   Substance and Sexual Activity    Alcohol use: No    Drug use: No    Sexual activity: Not on file   Lifestyle    Physical activity:     Days per week: Not on file     Minutes per session: Not on file    Stress: Not on file   Relationships    Social connections:     Talks on phone: Not on file     Gets together: Not on file     Attends Cheondoism service: Not on file     Active member of club or organization: Not on file     Attends meetings of clubs or organizations: Not on file     Relationship status: Not on file    Intimate partner violence:     Fear of current or ex partner: Not on file     Emotionally abused: Not on file     Physically abused: Not on file     Forced sexual activity: Not on file   Other Topics Concern    Not on file   Social History Narrative    Caffeine use    Uses safety equipment seatbelts     Past Medical History:   Diagnosis Date    Abnormal Pap smear of cervix     Anemia     Anxiety     Complete uterovaginal prolapse     Constipation     HPV (human papilloma virus) infection     Migraines     PONV (postoperative nausea and vomiting)     Seasonal allergies     Tinnitus     Wears contact lenses      Past Surgical History:   Procedure Laterality Date    CATARACT EXTRACTION Bilateral      SECTION      COLONOSCOPY      CYSTOSCOPY N/A 2018    Procedure: CYSTOSCOPY;  Surgeon: Ernestina Morales MD;  Location: [facility];  Service: Gynecology    EYE SURGERY      [place] REVAGINAL PROLAPSE,SACROSP LIG Right 2018    Procedure: FIXATION LIGAMENT SACROSPINOUS, RIGHT;  Surgeon: Ernestina Morales MD;  [facility];  Service: Gynecology    [place] VAGINAL HYSTERECTOMY,UTERUS 250 GMS/< N/A 9/25/2018    Procedure: TOTAL VAG  HYSTERECTOMY;  Surgeon: Ernestina Morales MD;  Location: [facility];  Service: Gynecology    WISDOM TOOTH EXTRACTION       Allergies   Allergen Reactions    Latex Rash    Sulfamethoxazole-Trimethoprim Rash       Current Outpatient Medications:     ALPRAZolam (XANAX) 0 25 mg tablet, Take 1 tablet (0 25 mg total) by mouth daily at bedtime as needed for anxiety, Disp: 30 tablet, Rfl: 0    benzonatate (TESSALON PERLES) 100 mg capsule, Take 1 capsule (100 mg total) by mouth 3 (three) times a day as needed for cough, Disp: 20 capsule, Rfl: 0    ibuprofen (MOTRIN) 800 mg tablet, ibuprofen 800 mg tablet  every 6 hrs prn, Disp: , Rfl:     Multiple Vitamin (MULTIVITAMIN) capsule, Take 1 capsule by mouth daily, Disp: , Rfl:     rizatriptan (MAXALT-MLT) 10 MG disintegrating tablet, Dissolve 1 tablet by mouth once as needed for migraine for up to 1 dose, may repeat in 2 hours if needed, Disp: 9 tablet, Rfl: 0    azithromycin (ZITHROMAX) 250 mg tablet, Take 2 tabs on Day 1 than 1 tab Days 2-5, Disp: 6 tablet, Rfl: 0    methylPREDNISolone 4 MG tablet therapy pack, Use as directed on package, Disp: 21 each, Rfl: 0      Review of Systems   Constitutional: Negative  HENT: Positive for congestion  Eyes: Negative  Respiratory: Positive for cough and chest tightness  Cardiovascular: Negative  Gastrointestinal: Negative  Genitourinary: Negative  Musculoskeletal: Negative  Skin: Negative  Allergic/Immunologic: Negative  Neurological: Positive for headaches  Hematological: Negative  Psychiatric/Behavioral: The patient is nervous/anxious  Objective:    /62   Pulse 78   Temp 99 1 °F (37 3 °C)   Resp 16   Ht 5' 5 5" (1 664 m)   Wt 61 2 kg (135 lb)   LMP 09/10/2018   BMI 22 12 kg/m² (Reviewed)     Physical Exam   Constitutional: She is oriented to person, place, and time   Vital signs are normal  She appears well-developed and well-nourished  HENT:   Head: Normocephalic and atraumatic  Right Ear: Tympanic membrane, external ear and ear canal normal    Left Ear: Tympanic membrane, external ear and ear canal normal    Nose: Nose normal    Mouth/Throat: Uvula is midline, oropharynx is clear and moist and mucous membranes are normal    Eyes: Pupils are equal, round, and reactive to light  Conjunctivae, EOM and lids are normal    Neck: Trachea normal and full passive range of motion without pain  Neck supple  Cardiovascular: Normal rate, regular rhythm, normal heart sounds and intact distal pulses  Pulmonary/Chest: Effort normal  She has no decreased breath sounds  She has wheezes  She has rhonchi in the right upper field, the right middle field, the left upper field and the left middle field  She has no rales  Abdominal: Soft  Bowel sounds are normal  There is no tenderness  Musculoskeletal: Normal range of motion  Lymphadenopathy:     She has no cervical adenopathy  Neurological: She is alert and oriented to person, place, and time  She has normal strength and normal reflexes  No cranial nerve deficit or sensory deficit  Skin: Skin is warm and dry  Capillary refill takes less than 2 seconds  Psychiatric: She has a normal mood and affect  Her speech is normal and behavior is normal        ESPINOZA-7 Flowsheet Screening      Most Recent Value   Over the last two weeks, how often have you been bothered by the following problems? Feeling nervous, anxious, or on edge  2   Not being able to stop or control worrying  1   Worrying too much about different things  1   Trouble relaxing   3   Being so restless that it's hard to sit still  1   Becoming easily annoyed or irritable   2   Feeling afraid as if something awful might happen  1   How difficult have these problems made it for you to do your work, take care of things at home, or get along with other people?    Somewhat difficult   ESPINOZA Score   11

## 2019-10-28 ENCOUNTER — ANNUAL EXAM (OUTPATIENT)
Dept: OBGYN CLINIC | Facility: CLINIC | Age: 49
End: 2019-10-28
Payer: COMMERCIAL

## 2019-10-28 VITALS
DIASTOLIC BLOOD PRESSURE: 70 MMHG | BODY MASS INDEX: 21.69 KG/M2 | HEIGHT: 66 IN | SYSTOLIC BLOOD PRESSURE: 104 MMHG | WEIGHT: 135 LBS

## 2019-10-28 DIAGNOSIS — Z01.419 ENCOUNTER FOR GYNECOLOGICAL EXAMINATION WITHOUT ABNORMAL FINDING: Primary | ICD-10-CM

## 2019-10-28 PROCEDURE — 99396 PREV VISIT EST AGE 40-64: CPT | Performed by: OBSTETRICS & GYNECOLOGY

## 2019-10-28 NOTE — PROGRESS NOTES
CC:  Annual exam    HPI: Myriam Gillis presents for  Routine yearly exam   She is doing well but concerned that she may have recurrence of some type of vaginal descensus  She feels a sac-like area  She denies any problems with her bowel movements or bladder habits  She has not had any pelvic pain or other problems  She had a total hysterectomy a year ago  Past Medical History:  Past Medical History:   Diagnosis Date    Abnormal Pap smear of cervix     Anemia     Anxiety     Complete uterovaginal prolapse     Constipation     HPV (human papilloma virus) infection     Migraines     PONV (postoperative nausea and vomiting)     Seasonal allergies     Tinnitus     Varicella     Wears contact lenses        Past Surgical History:  Past Surgical History:   Procedure Laterality Date    CATARACT EXTRACTION Bilateral      SECTION      COLONOSCOPY      CYSTOSCOPY N/A 2018    Procedure: CYSTOSCOPY;  Surgeon: Francisca Aponte MD;  [facility];  Service: Gynecology    EYE SURGERY      MO REVAGINAL PROLAPSE,SACROSP LIG Right 2018    Procedure: FIXATION LIGAMENT SACROSPINOUS, RIGHT;  Surgeon: Francisca Aponte MD;  [facility];  Service: Gynecology    MO VAGINAL HYSTERECTOMY,UTERUS 250 GMS/< N/A 2018    Procedure: TOTAL VAG  HYSTERECTOMY;  Surgeon: Francisca Aponte MD;  [facility];  Service: Gynecology    WISDOM TOOTH EXTRACTION         Past OB/Gyn History:    Patient is status post hysterectomy  Denies any history of sexually transmitted infection  No history of abnormal pap smears       ALLERGIES:   Allergies   Allergen Reactions    Latex Rash    Sulfamethoxazole-Trimethoprim Rash       MEDS:   Current Outpatient Medications:     ALPRAZolam (XANAX) 0 25 mg tablet    benzonatate (TESSALON PERLES) 100 mg capsule    ibuprofen (MOTRIN) 800 mg tablet    Multiple Vitamin (MULTIVITAMIN) capsule    rizatriptan (MAXALT-MLT) 10 MG disintegrating tablet   topiramate (TOPAMAX) 50 MG tablet    methylPREDNISolone 4 MG tablet therapy pack    Family History:  Family History   Problem Relation Age of Onset    Kidney failure Father         acute per allscripts    Diabetes type II Father        Social History:  Social History     Socioeconomic History    Marital status: Single     Spouse name: Not on file    Number of children: 3    Years of education: Not on file    Highest education level: Not on file   Occupational History    Not on file   Social Needs    Financial resource strain: Not on file    Food insecurity:     Worry: Not on file     Inability: Not on file    Transportation needs:     Medical: Not on file     Non-medical: Not on file   Tobacco Use    Smoking status: Never Smoker    Smokeless tobacco: Never Used   Substance and Sexual Activity    Alcohol use: No    Drug use: No    Sexual activity: Not Currently   Lifestyle    Physical activity:     Days per week: Not on file     Minutes per session: Not on file    Stress: Not on file   Relationships    Social connections:     Talks on phone: Not on file     Gets together: Not on file     Attends Restoration service: Not on file     Active member of club or organization: Not on file     Attends meetings of clubs or organizations: Not on file     Relationship status: Not on file    Intimate partner violence:     Fear of current or ex partner: Not on file     Emotionally abused: Not on file     Physically abused: Not on file     Forced sexual activity: Not on file   Other Topics Concern    Not on file   Social History Narrative    Caffeine use    Uses safety equipment seatbelts         Review of Systems:  Gen:   Denies fatigue, chills, nausea, vomiting, fever  Skin: No rashes or discolorations of any concern  RESP: Denies SOB, no cough  CV: Denies chest pain or palpitations  Breasts: Denies masses, pain, skin changes and nipple discharge     GI: Denies abdominal pain, heartburn, nausea, vomiting, changes in bowel habits  : Denies dysuria, frequency, CVA tenderness, incontinence and hematuria  Genitalia: Denies abnormal vaginal discharge, external lesions, rashes, pelvic pain, pressure, abnormal bleeding  Rectal:  Denies pain, bleeding, hemorrhoids,    Physical Exam:  /70 (BP Location: Left arm, Patient Position: Sitting, Cuff Size: Standard)   Ht 5' 5 5" (1 664 m)   Wt 61 2 kg (135 lb)   LMP 09/10/2018   BMI 22 12 kg/m²    Gen: The patient was alert and oriented x3, pleasant well-appearing female in no acute distress  Neck:  Unremarkable, no lymphadenopathy, no thyromegaly, or tenderness  Breasts: Symmetric  No dominant, discrete, fixed  or suspicious masses are noted  No skin or nipple changes  No palpable axillary nodes  No supraclavicular adenopathy  Abd:  Soft, nontender, nondistended, no masses or organomegaly  Back:  No CVA tenderness, no tenderness to palpation along spine  Pelvic:  Normal appearing external female genitalia, no visible lesions, no rashes  Vagina is free of discharge, normal vaginal epithelium, no abnormal  lesions, no evidence of prolapse anteriorly or posteriorly  The cuff is well suspended, mobile and nontender  There is surgical absence of the uterus and cervix  No palpable adnexal masses or tenderness  No anoperineal lesions  Rectal:  No masses, tenderness, hemorrhoids, or obvious blood  Skin:  No concerning lesions  Extremeties: No edema      Assessment & Plan:   1  Routine annual exam      RTO one year orPRN  2   Patient is overdue for follow-up mammograms at the [address] [place] [address]  She was informed that these are already in the computer and that she should call the diagnostic center to schedule a vis    3  Status post vaginal  Hysterectomy  Patient reassured that at present she has very little evidence of cystocele and the vaginal cuff is well suspended

## 2019-11-04 ENCOUNTER — TELEPHONE (OUTPATIENT)
Dept: FAMILY MEDICINE CLINIC | Facility: CLINIC | Age: 49
End: 2019-11-04

## 2019-11-04 DIAGNOSIS — J06.9 URI, ACUTE: ICD-10-CM

## 2019-11-04 RX ORDER — AZITHROMYCIN 250 MG/1
TABLET, FILM COATED ORAL
Qty: 6 TABLET | Refills: 0 | Status: SHIPPED | OUTPATIENT
Start: 2019-11-04 | End: 2019-11-08

## 2019-11-04 RX ORDER — METHYLPREDNISOLONE 4 MG/1
TABLET ORAL
Qty: 21 EACH | Refills: 0 | Status: SHIPPED | OUTPATIENT
Start: 2019-11-04 | End: 2020-01-23

## 2019-11-04 NOTE — TELEPHONE ENCOUNTER
Patient called to report she improved from round of antibiotics & steroids for cough from OV on 10/14/19  She said cough returned last week & wondering if you would call in another round of meds? Offered appointment but did not want to schedule      Hoang Ibrahim

## 2019-11-20 ENCOUNTER — OFFICE VISIT (OUTPATIENT)
Dept: FAMILY MEDICINE CLINIC | Facility: CLINIC | Age: 49
End: 2019-11-20
Payer: COMMERCIAL

## 2019-11-20 VITALS
RESPIRATION RATE: 16 BRPM | SYSTOLIC BLOOD PRESSURE: 142 MMHG | DIASTOLIC BLOOD PRESSURE: 82 MMHG | BODY MASS INDEX: 22.52 KG/M2 | TEMPERATURE: 98.1 F | HEART RATE: 91 BPM | WEIGHT: 137.4 LBS | OXYGEN SATURATION: 98 %

## 2019-11-20 DIAGNOSIS — J01.01 ACUTE RECURRENT MAXILLARY SINUSITIS: Primary | ICD-10-CM

## 2019-11-20 DIAGNOSIS — M54.2 NECK PAIN: ICD-10-CM

## 2019-11-20 PROCEDURE — 1036F TOBACCO NON-USER: CPT | Performed by: FAMILY MEDICINE

## 2019-11-20 PROCEDURE — 99214 OFFICE O/P EST MOD 30 MIN: CPT | Performed by: FAMILY MEDICINE

## 2019-11-20 RX ORDER — CEFUROXIME AXETIL 500 MG/1
500 TABLET ORAL 2 TIMES DAILY WITH MEALS
Qty: 20 TABLET | Refills: 0 | Status: SHIPPED | OUTPATIENT
Start: 2019-11-20 | End: 2019-11-30

## 2019-11-20 NOTE — PROGRESS NOTES
Assessment/Plan:    No problem-specific Assessment & Plan notes found for this encounter  Diagnoses and all orders for this visit:    Acute recurrent maxillary sinusitis  Comments:  Pt stable on exam   Treating with Ceftin, warm salt water gargles, OTC Cough/Cold Preps PRN, rest, and good PO hydration  Orders:  -     cefuroxime (CEFTIN) 500 mg tablet; Take 1 tablet (500 mg total) by mouth 2 (two) times a day with meals for 10 days    Neck pain  Comments:  Ongoing issue for the pt; with likely assoc RUE radicular symptoms  See discussion below  Pt to contact / f/u with her Orthopedist           Subjective:      Patient ID: Lionel Charlton is a 47 y o  female  Pt with recent sinus pressure, headaches, low grade temps, runny nose, ST, etc   Going on x 1 week  She also has ongoing neck issues, with paresthesias in the right arm / hand  For this though, pt has seen [facility] (Cervical Spine MRI reviewed - DJD, bulging disc, no HNP; pt had negative w/u for MS), Ortho with [facility] (no records available), and [facility]  She did not complete the full course of PT due to cost though  We discussed at length today, including her cervical spine MRI results, potential treatments, etc   After this long discussion - I did recommend that she contacts / follows up with her Orthopedist there to discuss the next step in her management  Pt is not pregnant at this time - hx of hysterectomy          The following portions of the patient's history were reviewed and updated as appropriate: allergies, current medications, past family history, past social history, past surgical history and problem list     Past Medical History:   Diagnosis Date    Abnormal Pap smear of cervix     Anemia     Anxiety     Complete uterovaginal prolapse     Constipation     HPV (human papilloma virus) infection     Migraines     PONV (postoperative nausea and vomiting)     Seasonal allergies     Tinnitus     Varicella     Wears contact lenses        Current Outpatient Medications:     ALPRAZolam (XANAX) 0 25 mg tablet, Take 1 tablet (0 25 mg total) by mouth daily at bedtime as needed for anxiety, Disp: 30 tablet, Rfl: 0    benzonatate (TESSALON PERLES) 100 mg capsule, Take 1 capsule (100 mg total) by mouth 3 (three) times a day as needed for cough, Disp: 20 capsule, Rfl: 0    cefuroxime (CEFTIN) 500 mg tablet, Take 1 tablet (500 mg total) by mouth 2 (two) times a day with meals for 10 days, Disp: 20 tablet, Rfl: 0    ibuprofen (MOTRIN) 800 mg tablet, ibuprofen 800 mg tablet  every 6 hrs prn, Disp: , Rfl:     methylPREDNISolone 4 MG tablet therapy pack, Use as directed on package, Disp: 21 each, Rfl: 0    Multiple Vitamin (MULTIVITAMIN) capsule, Take 1 capsule by mouth daily, Disp: , Rfl:     rizatriptan (MAXALT-MLT) 10 MG disintegrating tablet, Dissolve 1 tablet by mouth once as needed for migraine for up to 1 dose, may repeat in 2 hours if needed, Disp: 9 tablet, Rfl: 0    topiramate (TOPAMAX) 50 MG tablet, Take by mouth, Disp: , Rfl:     Allergies   Allergen Reactions    Latex Rash    Sulfamethoxazole-Trimethoprim Rash     Past Surgical History:   Procedure Laterality Date    CATARACT EXTRACTION Bilateral      SECTION      COLONOSCOPY      CYSTOSCOPY N/A 2018    Procedure: CYSTOSCOPY;  Surgeon: Radha Denton MD;  [facility];  Service: Gynecology    EYE SURGERY      MI REVAGINAL PROLAPSE,SACROSP LIG Right 2018    Procedure: FIXATION LIGAMENT SACROSPINOUS, RIGHT;  Surgeon: Radha Denton MD;  [facility];  Service: Gynecology    MI VAGINAL HYSTERECTOMY,UTERUS 250 GMS/< N/A 2018    Procedure: TOTAL VAG  HYSTERECTOMY;  Surgeon: Radha Denton MD;  [facility];  Service: Gynecology    WISDOM TOOTH EXTRACTION       Social History     Tobacco Use    Smoking status: Never Smoker    Smokeless tobacco: Never Used   Substance Use Topics    Alcohol use: No    Drug use: No         Review of Systems   Constitutional: Positive for chills and fever  Negative for activity change  Feels feverish  HENT: Positive for congestion, rhinorrhea and sinus pressure  Respiratory: Negative for cough  Musculoskeletal: Positive for neck pain  Neurological:        Radicular pain  Objective:      /82   Pulse 91   Temp 98 1 °F (36 7 °C)   Resp 16   Wt 62 3 kg (137 lb 6 4 oz)   LMP 09/10/2018   SpO2 98%   BMI 22 52 kg/m²          Physical Exam   Constitutional: She is oriented to person, place, and time  She appears well-developed and well-nourished  No distress  HENT:   Head: Normocephalic and atraumatic  Right Ear: Hearing, tympanic membrane, external ear and ear canal normal    Left Ear: Hearing, tympanic membrane, external ear and ear canal normal    Nose: Mucosal edema present  Right sinus exhibits maxillary sinus tenderness  Left sinus exhibits maxillary sinus tenderness  Mouth/Throat: Oropharynx is clear and moist  No oropharyngeal exudate  Eyes: Conjunctivae are normal    Neck: Normal range of motion  Neck supple  No thyromegaly present  Cardiovascular: Normal rate, regular rhythm and normal heart sounds  Exam reveals no gallop and no friction rub  No murmur heard  Pulmonary/Chest: Effort normal and breath sounds normal  No stridor  No respiratory distress  She has no wheezes  She has no rales  Lymphadenopathy:     She has no cervical adenopathy  Neurological: She is alert and oriented to person, place, and time  She has normal strength  Reflex Scores:       Tricep reflexes are 2+ on the right side and 2+ on the left side  Bicep reflexes are 2+ on the right side and 2+ on the left side  Brachioradialis reflexes are 2+ on the right side and 2+ on the left side  Muscle strength +5/5 in the bilateral upper extremities  Skin: She is not diaphoretic     Psychiatric: Her behavior is normal  Judgment and thought content normal  Her mood appears anxious  Her speech is tangential    Nursing note and vitals reviewed

## 2019-11-21 DIAGNOSIS — G43.019 MIGRAINE WITHOUT AURA, INTRACTABLE: ICD-10-CM

## 2019-11-21 RX ORDER — RIZATRIPTAN BENZOATE 10 MG/1
TABLET, ORALLY DISINTEGRATING ORAL
Qty: 9 TABLET | Refills: 0 | Status: SHIPPED | OUTPATIENT
Start: 2019-11-21 | End: 2019-12-06 | Stop reason: SDUPTHER

## 2019-12-06 DIAGNOSIS — G43.019 MIGRAINE WITHOUT AURA, INTRACTABLE: ICD-10-CM

## 2019-12-06 DIAGNOSIS — F51.01 PRIMARY INSOMNIA: ICD-10-CM

## 2019-12-06 RX ORDER — RIZATRIPTAN BENZOATE 10 MG/1
TABLET, ORALLY DISINTEGRATING ORAL
Qty: 9 TABLET | Refills: 0 | Status: SHIPPED | OUTPATIENT
Start: 2019-12-06 | End: 2020-04-07 | Stop reason: SDUPTHER

## 2019-12-06 RX ORDER — ALPRAZOLAM 0.25 MG/1
0.25 TABLET ORAL
Qty: 30 TABLET | Refills: 0 | Status: SHIPPED | OUTPATIENT
Start: 2019-12-06 | End: 2020-02-14 | Stop reason: SDUPTHER

## 2019-12-12 ENCOUNTER — DOCUMENTATION (OUTPATIENT)
Dept: FAMILY MEDICINE CLINIC | Facility: OTHER | Age: 49
End: 2019-12-12

## 2020-01-02 ENCOUNTER — TELEPHONE (OUTPATIENT)
Dept: FAMILY MEDICINE CLINIC | Facility: CLINIC | Age: 50
End: 2020-01-02

## 2020-01-02 DIAGNOSIS — F51.01 PRIMARY INSOMNIA: Primary | ICD-10-CM

## 2020-01-02 RX ORDER — ZOLPIDEM TARTRATE 10 MG/1
10 TABLET ORAL
Qty: 30 TABLET | Refills: 0 | Status: SHIPPED | OUTPATIENT
Start: 2020-01-02 | End: 2020-01-23

## 2020-01-02 NOTE — TELEPHONE ENCOUNTER
PT CALLED AND IS TAKING XANAX TO HELP HER SLEEP AND ASKED IF YOU COULD SWITCH HER TO AMBIEN TO PHARMACY ON FILE

## 2020-01-06 ENCOUNTER — TELEPHONE (OUTPATIENT)
Dept: FAMILY MEDICINE CLINIC | Facility: CLINIC | Age: 50
End: 2020-01-06

## 2020-01-06 NOTE — TELEPHONE ENCOUNTER
PT CALLED LAST WEEK AND ASKED FOR AMBIEN BUT SHE WAS WRONG SHE NEEDS ATIVAN SENT TO [facility] IN [place] [facility]

## 2020-01-07 NOTE — TELEPHONE ENCOUNTER
SPOKE WITH PT TRIED AMBIEN TWICE DIDN'T WORK AND STOPPED IT  PT STOPPED XANAX LAST WEEK EXCEPT TOOK ONE LAST NIGHT TO SLEEP  PT WOULD LIKE TO STOP XANAX AND START THE ATIVAN

## 2020-01-07 NOTE — TELEPHONE ENCOUNTER
Bennett Ashley so is she taking ambien to sleep? What is wrong with her xanax she is taking? She cant be on both ativan and xanax  Is she going to stop the xanax?

## 2020-01-08 NOTE — TELEPHONE ENCOUNTER
Ativan for anxiety and I dont recommend that to use it for sleep as it can be habit forming    Does she want to try sleeping meds like trazodone, lunesta, etc?

## 2020-01-23 ENCOUNTER — OFFICE VISIT (OUTPATIENT)
Dept: FAMILY MEDICINE CLINIC | Facility: CLINIC | Age: 50
End: 2020-01-23
Payer: COMMERCIAL

## 2020-01-23 VITALS
HEIGHT: 65 IN | DIASTOLIC BLOOD PRESSURE: 84 MMHG | BODY MASS INDEX: 23.03 KG/M2 | SYSTOLIC BLOOD PRESSURE: 134 MMHG | WEIGHT: 138.2 LBS | RESPIRATION RATE: 16 BRPM | TEMPERATURE: 99.2 F | OXYGEN SATURATION: 99 % | HEART RATE: 83 BPM

## 2020-01-23 DIAGNOSIS — F51.01 PRIMARY INSOMNIA: ICD-10-CM

## 2020-01-23 DIAGNOSIS — Z12.31 VISIT FOR SCREENING MAMMOGRAM: ICD-10-CM

## 2020-01-23 DIAGNOSIS — I95.1 ORTHOSTATIC HYPOTENSION: ICD-10-CM

## 2020-01-23 DIAGNOSIS — M54.12 CERVICAL RADICULOPATHY: ICD-10-CM

## 2020-01-23 DIAGNOSIS — G43.019 MIGRAINE WITHOUT AURA, INTRACTABLE: Primary | ICD-10-CM

## 2020-01-23 PROCEDURE — 1036F TOBACCO NON-USER: CPT | Performed by: FAMILY MEDICINE

## 2020-01-23 PROCEDURE — 3075F SYST BP GE 130 - 139MM HG: CPT | Performed by: FAMILY MEDICINE

## 2020-01-23 PROCEDURE — 3008F BODY MASS INDEX DOCD: CPT | Performed by: NURSE PRACTITIONER

## 2020-01-23 PROCEDURE — 99214 OFFICE O/P EST MOD 30 MIN: CPT | Performed by: FAMILY MEDICINE

## 2020-01-23 PROCEDURE — 3079F DIAST BP 80-89 MM HG: CPT | Performed by: FAMILY MEDICINE

## 2020-01-23 PROCEDURE — 3008F BODY MASS INDEX DOCD: CPT | Performed by: FAMILY MEDICINE

## 2020-01-23 RX ORDER — TOPIRAMATE 25 MG/1
25 TABLET ORAL DAILY
Qty: 90 TABLET | Refills: 0 | Status: SHIPPED | OUTPATIENT
Start: 2020-01-23 | End: 2020-04-10

## 2020-01-23 NOTE — PROGRESS NOTES
Assessment/Plan:    No problem-specific Assessment & Plan notes found for this encounter  Diagnoses and all orders for this visit:    Migraine without aura, intractable  Comments:  trial of Topamax  Orders:  -     topiramate (TOPAMAX) 25 mg tablet; Take 1 tablet (25 mg total) by mouth daily    Primary insomnia  -     topiramate (TOPAMAX) 25 mg tablet; Take 1 tablet (25 mg total) by mouth daily    Orthostatic hypotension  Comments:  stable  continue to monitor     Cervical radiculopathy  -     Ambulatory referral to Pain Management; Future    Visit for screening mammogram  -     Mammo screening bilateral w cad; Future          Subjective:      Patient ID: Delores Breaux is a 47 y o  female  Here to follow up  Worsening anxiety   Neck pain on and off   Never follow up with ortho  Still cannot sleep      Anxiety   Presents for follow-up visit  Symptoms include excessive worry, irritability, nervous/anxious behavior and palpitations  Patient reports no chest pain, compulsions, confusion, decreased concentration, dizziness, dry mouth, feeling of choking, hyperventilation, impotence, insomnia, malaise, muscle tension, nausea, obsessions, panic, restlessness, shortness of breath or suicidal ideas  Symptoms occur occasionally  The quality of sleep is poor  Nighttime awakenings: several      Compliance with medications is %  Neck Pain    This is a chronic problem  The current episode started more than 1 year ago  The problem occurs intermittently  The pain is associated with nothing  The pain is at a severity of 2/10  The pain is mild  Pertinent negatives include no chest pain, fever, headaches, pain with swallowing, paresis, visual change, weakness or weight loss  She has tried home exercises, NSAIDs and muscle relaxants for the symptoms  The treatment provided mild relief         The following portions of the patient's history were reviewed and updated as appropriate: allergies, current medications, past family history, past medical history, past social history, past surgical history and problem list     Review of Systems   Constitutional: Positive for irritability  Negative for fever and weight loss  HENT: Negative  Eyes: Negative  Respiratory: Negative  Negative for shortness of breath  Cardiovascular: Positive for palpitations  Negative for chest pain  Gastrointestinal: Negative  Negative for nausea  Genitourinary: Negative  Negative for impotence  Musculoskeletal: Positive for neck pain  Neurological: Negative for dizziness, facial asymmetry, weakness and headaches  Psychiatric/Behavioral: Negative for confusion, decreased concentration and suicidal ideas  The patient is nervous/anxious  The patient does not have insomnia  Objective:      /84 (BP Location: Left arm, Patient Position: Sitting, Cuff Size: Standard)   Pulse 83   Temp 99 2 °F (37 3 °C) (Tympanic)   Resp 16   Ht 5' 5" (1 651 m)   Wt 62 7 kg (138 lb 3 2 oz)   LMP 09/10/2018   SpO2 99%   BMI 23 00 kg/m²          Physical Exam   Constitutional: She is oriented to person, place, and time  She appears well-developed and well-nourished  HENT:   Head: Normocephalic and atraumatic  Eyes: Pupils are equal, round, and reactive to light  EOM are normal    Neck: No tracheal deviation present  No thyromegaly present  Cardiovascular: Normal rate and regular rhythm  Exam reveals no friction rub  No murmur heard  Pulmonary/Chest: Effort normal and breath sounds normal  No stridor  No respiratory distress  Abdominal: She exhibits no distension  There is no tenderness  Musculoskeletal: She exhibits no tenderness  Neurological: She is alert and oriented to person, place, and time  No cranial nerve deficit  Coordination normal    Psychiatric: She has a normal mood and affect   Her behavior is normal

## 2020-02-13 ENCOUNTER — TELEPHONE (OUTPATIENT)
Dept: FAMILY MEDICINE CLINIC | Facility: CLINIC | Age: 50
End: 2020-02-13

## 2020-02-13 DIAGNOSIS — M25.512 ACUTE PAIN OF LEFT SHOULDER: ICD-10-CM

## 2020-02-13 DIAGNOSIS — G89.29 CHRONIC BILATERAL LOW BACK PAIN WITHOUT SCIATICA: ICD-10-CM

## 2020-02-13 DIAGNOSIS — M54.12 CERVICAL RADICULOPATHY: Primary | ICD-10-CM

## 2020-02-13 DIAGNOSIS — M54.50 CHRONIC BILATERAL LOW BACK PAIN WITHOUT SCIATICA: ICD-10-CM

## 2020-02-13 NOTE — TELEPHONE ENCOUNTER
Patient called asking if Dr Yani Martinez could write a physical therapy referral to [facility] in [place]  Patient is having neck, right shoulder, and back pain  The pain tingles down the right arm  Patient stated she had seen Dr Yani Martinez for this before and went to therapy and helped  If referral can be written patient would like it faxed to [phone]   Please advise

## 2020-02-14 DIAGNOSIS — F51.01 PRIMARY INSOMNIA: ICD-10-CM

## 2020-02-14 RX ORDER — ALPRAZOLAM 0.25 MG/1
0.25 TABLET ORAL
Qty: 30 TABLET | Refills: 0 | Status: SHIPPED | OUTPATIENT
Start: 2020-02-14 | End: 2020-04-07 | Stop reason: SDUPTHER

## 2020-02-17 DIAGNOSIS — J20.9 ACUTE BRONCHITIS, UNSPECIFIED ORGANISM: ICD-10-CM

## 2020-02-17 RX ORDER — ALBUTEROL SULFATE 90 UG/1
2 AEROSOL, METERED RESPIRATORY (INHALATION) EVERY 6 HOURS PRN
Qty: 8.5 G | Refills: 0 | Status: SHIPPED | OUTPATIENT
Start: 2020-02-17 | End: 2021-02-24

## 2020-04-07 DIAGNOSIS — F51.01 PRIMARY INSOMNIA: ICD-10-CM

## 2020-04-07 DIAGNOSIS — G43.019 MIGRAINE WITHOUT AURA, INTRACTABLE: ICD-10-CM

## 2020-04-07 RX ORDER — ALPRAZOLAM 0.25 MG/1
0.25 TABLET ORAL
Qty: 30 TABLET | Refills: 0 | Status: SHIPPED | OUTPATIENT
Start: 2020-04-07 | End: 2020-07-16

## 2020-04-07 RX ORDER — RIZATRIPTAN BENZOATE 10 MG/1
TABLET, ORALLY DISINTEGRATING ORAL
Qty: 9 TABLET | Refills: 0 | Status: SHIPPED | OUTPATIENT
Start: 2020-04-07 | End: 2020-08-17

## 2020-04-10 ENCOUNTER — TELEMEDICINE (OUTPATIENT)
Dept: FAMILY MEDICINE CLINIC | Facility: CLINIC | Age: 50
End: 2020-04-10
Payer: COMMERCIAL

## 2020-04-10 VITALS — TEMPERATURE: 95.5 F

## 2020-04-10 DIAGNOSIS — H00.014 HORDEOLUM EXTERNUM OF LEFT UPPER EYELID: Primary | ICD-10-CM

## 2020-04-10 DIAGNOSIS — R22.0 LEFT FACIAL SWELLING: ICD-10-CM

## 2020-04-10 PROCEDURE — 99214 OFFICE O/P EST MOD 30 MIN: CPT | Performed by: FAMILY MEDICINE

## 2020-04-10 RX ORDER — PREDNISONE 10 MG/1
TABLET ORAL
Qty: 20 TABLET | Refills: 0 | Status: SHIPPED | OUTPATIENT
Start: 2020-04-10 | End: 2021-02-24

## 2020-04-10 RX ORDER — CEPHALEXIN 500 MG/1
500 CAPSULE ORAL EVERY 8 HOURS SCHEDULED
Qty: 21 CAPSULE | Refills: 0 | Status: SHIPPED | OUTPATIENT
Start: 2020-04-10 | End: 2020-04-17

## 2020-04-10 RX ORDER — ERYTHROMYCIN 5 MG/G
0.5 OINTMENT OPHTHALMIC EVERY 8 HOURS SCHEDULED
Qty: 21 G | Refills: 0 | Status: SHIPPED | OUTPATIENT
Start: 2020-04-10 | End: 2020-04-17

## 2020-04-14 ENCOUNTER — TELEMEDICINE (OUTPATIENT)
Dept: FAMILY MEDICINE CLINIC | Facility: CLINIC | Age: 50
End: 2020-04-14
Payer: COMMERCIAL

## 2020-04-14 VITALS — BODY MASS INDEX: 22.03 KG/M2 | WEIGHT: 132.4 LBS

## 2020-04-14 DIAGNOSIS — H10.33 ACUTE BACTERIAL CONJUNCTIVITIS OF BOTH EYES: Primary | ICD-10-CM

## 2020-04-14 PROCEDURE — 99213 OFFICE O/P EST LOW 20 MIN: CPT | Performed by: NURSE PRACTITIONER

## 2020-05-07 ENCOUNTER — TELEPHONE (OUTPATIENT)
Dept: FAMILY MEDICINE CLINIC | Facility: CLINIC | Age: 50
End: 2020-05-07

## 2020-05-07 DIAGNOSIS — J30.2 SEASONAL ALLERGIES: Primary | ICD-10-CM

## 2020-05-07 RX ORDER — MONTELUKAST SODIUM 10 MG/1
10 TABLET ORAL
Qty: 90 TABLET | Refills: 0 | Status: SHIPPED | OUTPATIENT
Start: 2020-05-07 | End: 2021-02-24

## 2020-07-15 DIAGNOSIS — F51.01 PRIMARY INSOMNIA: ICD-10-CM

## 2020-07-16 RX ORDER — ALPRAZOLAM 0.25 MG/1
TABLET ORAL
Qty: 30 TABLET | Refills: 0 | Status: SHIPPED | OUTPATIENT
Start: 2020-07-16

## 2020-07-16 NOTE — TELEPHONE ENCOUNTER
Medication: xanax  PDMP:   04/07/2020  1  04/07/2020  ALPRAZOLAM 0 25 MG TABLET  30 0  30  [facility]       Active agreement on file -No

## 2020-08-15 DIAGNOSIS — G43.019 MIGRAINE WITHOUT AURA, INTRACTABLE: ICD-10-CM

## 2020-08-17 RX ORDER — RIZATRIPTAN BENZOATE 10 MG/1
TABLET, ORALLY DISINTEGRATING ORAL
Qty: 9 TABLET | Refills: 0 | Status: SHIPPED | OUTPATIENT
Start: 2020-08-17 | End: 2020-10-05

## 2020-09-29 ENCOUNTER — TELEPHONE (OUTPATIENT)
Dept: GASTROENTEROLOGY | Facility: AMBULARY SURGERY CENTER | Age: 50
End: 2020-09-29

## 2020-10-04 DIAGNOSIS — G43.019 MIGRAINE WITHOUT AURA, INTRACTABLE: ICD-10-CM

## 2020-10-05 RX ORDER — RIZATRIPTAN BENZOATE 10 MG/1
TABLET, ORALLY DISINTEGRATING ORAL
Qty: 9 TABLET | Refills: 0 | Status: SHIPPED | OUTPATIENT
Start: 2020-10-05 | End: 2021-01-18

## 2020-11-17 ENCOUNTER — TELEPHONE (OUTPATIENT)
Dept: FAMILY MEDICINE CLINIC | Facility: CLINIC | Age: 50
End: 2020-11-17

## 2020-11-17 DIAGNOSIS — G89.29 CHRONIC BILATERAL LOW BACK PAIN WITHOUT SCIATICA: ICD-10-CM

## 2020-11-17 DIAGNOSIS — M54.12 CERVICAL RADICULOPATHY: Primary | ICD-10-CM

## 2020-11-17 DIAGNOSIS — M54.50 CHRONIC BILATERAL LOW BACK PAIN WITHOUT SCIATICA: ICD-10-CM

## 2020-11-17 RX ORDER — IBUPROFEN 600 MG/1
600 TABLET ORAL EVERY 6 HOURS PRN
Qty: 30 TABLET | Refills: 0 | Status: SHIPPED | OUTPATIENT
Start: 2020-11-17 | End: 2021-03-25

## 2021-01-05 ENCOUNTER — TELEPHONE (OUTPATIENT)
Dept: FAMILY MEDICINE CLINIC | Facility: CLINIC | Age: 51
End: 2021-01-05

## 2021-01-05 NOTE — TELEPHONE ENCOUNTER
She can try delsym for cough  Dry cough and burning in chest are symptoms for acid reflux and I do want her to try pepcid or prilosec to see that helps  It wont hurt her to try   If she feels worse, she needs to be seen

## 2021-01-05 NOTE — TELEPHONE ENCOUNTER
Patient wanted to know what she can take for the dry cough, and she said the burning in her chest is not acid reflux and she said the Pepcid and prilosec wont work   Please advise

## 2021-01-05 NOTE — TELEPHONE ENCOUNTER
Patient called and stated that she has a dry cough, and some burning in her chest, she wanted to know what OTC medication she can try   Please advise

## 2021-01-15 DIAGNOSIS — G43.019 MIGRAINE WITHOUT AURA, INTRACTABLE: ICD-10-CM

## 2021-01-18 RX ORDER — RIZATRIPTAN BENZOATE 10 MG/1
TABLET, ORALLY DISINTEGRATING ORAL
Qty: 9 TABLET | Refills: 0 | Status: SHIPPED | OUTPATIENT
Start: 2021-01-18 | End: 2021-05-05

## 2021-01-25 ENCOUNTER — TELEPHONE (OUTPATIENT)
Dept: FAMILY MEDICINE CLINIC | Facility: CLINIC | Age: 51
End: 2021-01-25

## 2021-01-25 DIAGNOSIS — M54.2 NECK PAIN: ICD-10-CM

## 2021-01-25 DIAGNOSIS — I95.1 ORTHOSTATIC HYPOTENSION: Primary | ICD-10-CM

## 2021-01-25 DIAGNOSIS — G43.019 MIGRAINE WITHOUT AURA, INTRACTABLE: ICD-10-CM

## 2021-01-25 DIAGNOSIS — D50.9 IRON DEFICIENCY ANEMIA, UNSPECIFIED IRON DEFICIENCY ANEMIA TYPE: ICD-10-CM

## 2021-01-25 NOTE — TELEPHONE ENCOUNTER
I ordered labs   And also I ordered pain management for trigger point injection for her neck pain as PT messaged me today

## 2021-01-25 NOTE — TELEPHONE ENCOUNTER
Patient wanted to know if you can put in lab work for her she has an upcoming physical scheduled for 3/24/21    Lab is [facility]

## 2021-02-17 ENCOUNTER — TRANSCRIBE ORDERS (OUTPATIENT)
Dept: LAB | Facility: CLINIC | Age: 51
End: 2021-02-17

## 2021-02-17 ENCOUNTER — LAB (OUTPATIENT)
Dept: LAB | Facility: CLINIC | Age: 51
End: 2021-02-17
Payer: COMMERCIAL

## 2021-02-17 DIAGNOSIS — D50.9 IRON DEFICIENCY ANEMIA, UNSPECIFIED IRON DEFICIENCY ANEMIA TYPE: ICD-10-CM

## 2021-02-17 DIAGNOSIS — I95.1 ORTHOSTATIC HYPOTENSION: ICD-10-CM

## 2021-02-17 LAB
ALBUMIN SERPL BCP-MCNC: 4.4 G/DL (ref 3.5–5)
ALP SERPL-CCNC: 90 U/L (ref 46–116)
ALT SERPL W P-5'-P-CCNC: 26 U/L (ref 12–78)
ANION GAP SERPL CALCULATED.3IONS-SCNC: 5 MMOL/L (ref 4–13)
AST SERPL W P-5'-P-CCNC: 20 U/L (ref 5–45)
BILIRUB SERPL-MCNC: 0.45 MG/DL (ref 0.2–1)
BUN SERPL-MCNC: 12 MG/DL (ref 5–25)
CALCIUM SERPL-MCNC: 10.8 MG/DL (ref 8.3–10.1)
CHLORIDE SERPL-SCNC: 106 MMOL/L (ref 100–108)
CHOLEST SERPL-MCNC: 221 MG/DL (ref 50–200)
CO2 SERPL-SCNC: 28 MMOL/L (ref 21–32)
CREAT SERPL-MCNC: 0.59 MG/DL (ref 0.6–1.3)
ERYTHROCYTE [DISTWIDTH] IN BLOOD BY AUTOMATED COUNT: 12.9 % (ref 11.6–15.1)
GFR SERPL CREATININE-BSD FRML MDRD: 106 ML/MIN/1.73SQ M
GLUCOSE P FAST SERPL-MCNC: 90 MG/DL (ref 65–99)
HCT VFR BLD AUTO: 45.2 % (ref 34.8–46.1)
HDLC SERPL-MCNC: 60 MG/DL
HGB BLD-MCNC: 14.4 G/DL (ref 11.5–15.4)
LDLC SERPL CALC-MCNC: 123 MG/DL (ref 0–100)
MCH RBC QN AUTO: 29.6 PG (ref 26.8–34.3)
MCHC RBC AUTO-ENTMCNC: 31.9 G/DL (ref 31.4–37.4)
MCV RBC AUTO: 93 FL (ref 82–98)
PLATELET # BLD AUTO: 238 THOUSANDS/UL (ref 149–390)
PMV BLD AUTO: 10.6 FL (ref 8.9–12.7)
POTASSIUM SERPL-SCNC: 4.1 MMOL/L (ref 3.5–5.3)
PROT SERPL-MCNC: 7.7 G/DL (ref 6.4–8.2)
RBC # BLD AUTO: 4.87 MILLION/UL (ref 3.81–5.12)
SODIUM SERPL-SCNC: 139 MMOL/L (ref 136–145)
TRIGL SERPL-MCNC: 189 MG/DL
WBC # BLD AUTO: 5.61 THOUSAND/UL (ref 4.31–10.16)

## 2021-02-17 PROCEDURE — 85027 COMPLETE CBC AUTOMATED: CPT

## 2021-02-17 PROCEDURE — 80053 COMPREHEN METABOLIC PANEL: CPT | Performed by: FAMILY MEDICINE

## 2021-02-17 PROCEDURE — 36415 COLL VENOUS BLD VENIPUNCTURE: CPT | Performed by: FAMILY MEDICINE

## 2021-02-17 PROCEDURE — 80061 LIPID PANEL: CPT

## 2021-02-19 DIAGNOSIS — R92.8 ABNORMAL MAMMOGRAM: ICD-10-CM

## 2021-02-19 DIAGNOSIS — E83.52 HYPERCALCEMIA: Primary | ICD-10-CM

## 2021-02-22 DIAGNOSIS — E83.52 HYPERCALCEMIA: Primary | ICD-10-CM

## 2021-02-24 ENCOUNTER — TRANSCRIBE ORDERS (OUTPATIENT)
Dept: PAIN MEDICINE | Facility: CLINIC | Age: 51
End: 2021-02-24

## 2021-02-24 ENCOUNTER — CONSULT (OUTPATIENT)
Dept: PAIN MEDICINE | Facility: CLINIC | Age: 51
End: 2021-02-24
Payer: COMMERCIAL

## 2021-02-24 VITALS
HEART RATE: 76 BPM | BODY MASS INDEX: 23.3 KG/M2 | SYSTOLIC BLOOD PRESSURE: 126 MMHG | WEIGHT: 140 LBS | DIASTOLIC BLOOD PRESSURE: 80 MMHG

## 2021-02-24 DIAGNOSIS — M79.18 MYOFASCIAL PAIN SYNDROME: ICD-10-CM

## 2021-02-24 DIAGNOSIS — M50.120 CERVICAL DISC DISORDER WITH RADICULOPATHY OF MID-CERVICAL REGION: Primary | ICD-10-CM

## 2021-02-24 PROCEDURE — 99244 OFF/OP CNSLTJ NEW/EST MOD 40: CPT | Performed by: ANESTHESIOLOGY

## 2021-02-24 PROCEDURE — 1036F TOBACCO NON-USER: CPT | Performed by: ANESTHESIOLOGY

## 2021-02-24 NOTE — PATIENT INSTRUCTIONS
Neck Exercises   WHAT YOU NEED TO KNOW:   Why is it important to do neck exercises? Neck exercises help reduce neck pain, and improve neck movement and strength  Neck exercises also help prevent long-term neck problems  What do I need to know about neck exercises? · Do the exercises every day,  or as often as directed by your healthcare provider  · Move slowly, gently, and smoothly  Avoid fast or jerky motions  · Stand and sit the way your healthcare provider shows you  Good posture may reduce your neck pain  Check your posture often, even when you are not doing your neck exercises  How do I perform neck exercises safely? · Exercise position:  You may sit or stand while you do neck exercises  Face forward  Your shoulders should be straight and relaxed, with a good posture  · Head tilts, forward and back:  Gently bow your head and try to touch your chin to your chest  Your healthcare provider may tell you to push on the back of your neck to help bow your head  Raise your chin back to the starting position  Tilt your head back as far as possible so you are looking up at the ceiling  Your healthcare provider may tell you to lift your chin to help tilt your head back  Return your head to the starting position  · Head tilts, side to side:  Tilt your head, bringing your ear toward your shoulder  Then tilt your head toward the other shoulder  · Head turns:  Turn your head to look over your shoulder  Tilt your chin down and try to touch it to your shoulder  Do not raise your shoulder to your chin  Face forward again  Do the same on the other side  · Head rolls:  Slowly bring your chin toward your chest  Next, roll your head to the right  Your ear should be positioned over your shoulder  Hold this position for 5 seconds  Roll your head back toward your chest and to the left into the same position  Hold for 5 seconds   Gently roll your head back and around in a clockwise [place] 3 times  Next, move your head in the reverse direction (counterclockwise) in a [place] 3 times  Do not shrug your shoulders upwards while you do this exercise  When should I contact my healthcare provider? · Your pain does not get better, or gets worse  · You have questions or concerns about your condition, care, or exercise program     CARE AGREEMENT:   You have the right to help plan your care  Learn about your health condition and how it may be treated  Discuss treatment options with your healthcare providers to decide what care you want to receive  You always have the right to refuse treatment  The above information is an  only  It is not intended as medical advice for individual conditions or treatments  Talk to your doctor, nurse or pharmacist before following any medical regimen to see if it is safe and effective for you  © Copyright [address] Information is for End User's use only and may not be sold, redistributed or otherwise used for commercial purposes   All illustrations and images included in CareNotes® are the copyrighted property of A D A M , Inc  or [address] [place] [address]

## 2021-02-24 NOTE — PROGRESS NOTES
Assessment  1  Cervical disc disorder with radiculopathy of mid-cervical region    2  Myofascial pain syndrome        Plan  The patient's symptoms, history / physical are consistent with pain that is multifactorial in origin but predominantly the result of her cervical disc bulging at C5-6 where she has some mild stenosis  This is leading to neck pain as well as myofascial and right-sided radicular symptoms  At this time, I discussed treatment that will be multimodal in approach     She will be referred to Dr Ferdinand Louis for acupuncture treatment and acupressure treatment since the physical therapy has not been helpful  She may continue with massage therapy since that is helpful  She will call with an update in a few weeks on how she is doing  If she is not symptomatic we better, I discussed performing a cervical epidural steroid injection to help reduce swelling and inflammation from the C5-6 disc bulging  Finally, I have advised her to trying get a sit/stand work station for her home computer so that she is able to stand more while she works from home  My impressions and treatment recommendations were discussed in detail with the patient who verbalized understanding and had no further questions  Discharge instructions were provided  I personally saw and examined the patient and I agree with the above discussed plan of care  Orders Placed This Encounter   Procedures    Ambulatory referral for Acupuncture     Standing Status:   Future     Standing Expiration Date:   2/24/2022     Referral Priority:   Routine     Referral Type:   Consult - AMB     Referral Reason:   Specialty Services Required     Referred to Provider:   Clovis Vasquez MD     Number of Visits Requested:   1     Expiration Date:   2/24/2022     No orders of the defined types were placed in this encounter        History of Present Illness    Jessie Morfin is a 45 y o  female referred by Dr Kathy Scott who presents for consultation in regards to neck pain upper back pain and right-sided arm pain  Symptoms have been present for 2 years without any precipitating injury or trauma  Pain is moderate to severe rated 6-7/10 on numeric rating scale and felt constantly worse the morning  Pain is described to be burning with numbness and paresthesias that radiates down the right arm and up the head causing headaches  Symptoms are aggravated with turning her head, bending, sitting  There is no change with coughing, sneezing or bowel movements  Treatment history has included physical therapy and heat/ ice which have provided moderate relief  Use of ibuprofen provides minimal relief  I have personally reviewed and/or updated the patient's past medical history, past surgical history, family history, social history, current medications, allergies, and vital signs today  Review of Systems   Constitutional: Negative for fever and unexpected weight change  HENT: Negative for trouble swallowing  Eyes: Negative for visual disturbance  Respiratory: Negative for shortness of breath and wheezing  Cardiovascular: Negative for chest pain and palpitations  Gastrointestinal: Negative for constipation, diarrhea, nausea and vomiting  Endocrine: Negative for cold intolerance, heat intolerance and polydipsia  Genitourinary: Negative for difficulty urinating and frequency  Musculoskeletal: Positive for joint swelling, neck pain and neck stiffness  Negative for arthralgias, gait problem and myalgias  Skin: Negative for rash  Neurological: Positive for numbness and headaches  Negative for dizziness, seizures, syncope and weakness  Hematological: Does not bruise/bleed easily  Psychiatric/Behavioral: Positive for decreased concentration  Negative for dysphoric mood  The patient is nervous/anxious  All other systems reviewed and are negative        Patient Active Problem List   Diagnosis    Migraine without aura, intractable    Insomnia    Anemia    CNS demyelination (HCC)    Allodynia    Orthostatic hypotension    Episodic cluster headache, not intractable    Acute bacterial conjunctivitis of both eyes       Past Medical History:   Diagnosis Date    Abnormal Pap smear of cervix     Anemia     Anxiety     Complete uterovaginal prolapse     Constipation     HPV (human papilloma virus) infection     Migraines     PONV (postoperative nausea and vomiting)     Seasonal allergies     Tinnitus     Varicella     Wears contact lenses        Past Surgical History:   Procedure Laterality Date    CATARACT EXTRACTION Bilateral      SECTION      COLONOSCOPY      CYSTOSCOPY N/A 2018    Procedure: CYSTOSCOPY;  Surgeon: Jazzy Campos MD;  [facility];  Service: Gynecology    EYE SURGERY      [place] REVAGINAL PROLAPSE,SACROSP LIG Right 2018    Procedure: FIXATION LIGAMENT SACROSPINOUS, RIGHT;  Surgeon: Jazzy Campos MD;  [facility];  Service: Gynecology    [place] VAGINAL HYSTERECTOMY,UTERUS 250 GMS/< N/A 2018    Procedure: TOTAL VAG  HYSTERECTOMY;  Surgeon: Jazzy Campos MD;  [facility];  Service: Gynecology    WISDOM TOOTH EXTRACTION         Family History   Problem Relation Age of Onset    Kidney failure Father         acute per allscripts    Diabetes type II Father        Social History     Occupational History    Not on file   Tobacco Use    Smoking status: Never Smoker    Smokeless tobacco: Never Used   Substance and Sexual Activity    Alcohol use: No    Drug use: No    Sexual activity: Not Currently       Current Outpatient Medications on File Prior to Visit   Medication Sig    ALPRAZolam (XANAX) 0 25 mg tablet TAKE 1 TABLET BY MOUTH AT BEDTIME AS NEEDED FOR ANXIETY    ibuprofen (MOTRIN) 600 mg tablet Take 1 tablet (600 mg total) by mouth every 6 (six) hours as needed for mild pain    Multiple Vitamin (MULTIVITAMIN) capsule Take 1 capsule by mouth daily    rizatriptan (MAXALT-MLT) 10 MG disintegrating tablet DISSOLVE ONE TABLET BY MOUTH ONCE AS NEEDED FOR MIGRAINE FOR UP TO ONE DOSE MAY REPEAT IN IN TWO HOURS IF NEEDED    [DISCONTINUED] albuterol (ProAir HFA) 90 mcg/act inhaler Inhale 2 puffs every 6 (six) hours as needed for wheezing (Patient not taking: Reported on 4/10/2020)    [DISCONTINUED] montelukast (SINGULAIR) 10 mg tablet Take 1 tablet (10 mg total) by mouth daily at bedtime    [DISCONTINUED] predniSONE 10 mg tablet 4 tabs x 2 days, 3 tabs x 2 days, 2 tabs x 2 days, 1 tab x 2 days     No current facility-administered medications on file prior to visit  Allergies   Allergen Reactions    Latex Rash    Sulfamethoxazole-Trimethoprim Rash       Physical Exam    /80   Pulse 76   Wt 63 5 kg (140 lb)   LMP 09/10/2018   BMI 23 30 kg/m²     Constitutional: normal, well developed, well nourished, alert, in no distress and non-toxic and no overt pain behavior    Eyes: anicteric  HEENT: grossly intact  Neck: supple, symmetric, trachea midline and no masses   Pulmonary:even and unlabored  Cardiovascular:No edema or pitting edema present  Skin:Normal without rashes or lesions and well hydrated  Psychiatric:Mood and affect appropriate  Neurologic:Cranial Nerves II-XII grossly intact  Musculoskeletal:normal     Cervical Spine Exam  Appearance:  Normal lordosis  Palpation/Tenderness:  right cervical paraspinal tenderness  left trapezium tenderness  right trapezium tenderness  trigger points palpable  Range of Motion:  Flexion:  Minimally limited  with pain  Extension:  Minimally limited  with pain  Rotation - Left:  Minimally limited  with pain  Rotation - Right:  Minimally limited  with pain  Motor Strength:  Left Arm Flexion  5/5  Left Arm Extension  5/5  Right Arm Flexion  5/5  Right Arm Extension  5/5  Left Wrist Flexion  5/5  Left Wrist Extension  5/5  Left Finger Abduction  5/5  Right Finger Abduction  5/5  Left Pincer Grasp  5/5  Right Pincer Grasp 5/5  Reflexes:  Left Biceps:  2+   Right Biceps:  2+   Left Triceps:  2+   Right Triceps:  2+     Imaging    MRI CERVICAL SPINE WITH AND WITHOUT CONTRAST (8/6/2019)     INDICATION: G37 9: Demyelinating disease of central nervous system, unspecified      COMPARISON:  None      TECHNIQUE:  Sagittal T1, sagittal T2, sagittal inversion recovery, axial 2D merge and axial T2  Sagittal T1 and axial T1 postcontrast     IV Contrast:  6 mL of gadobutrol injection (MULTI-DOSE)      IMAGE QUALITY:  Diagnostic      FINDINGS:     ALIGNMENT:  Straightening of normal cervical lordosis  Slight degenerative anterolisthesis of C5 on C6      MARROW SIGNAL:  Normal marrow signal is identified within the visualized bony structures  No discrete marrow lesion      CERVICAL AND VISUALIZED UPPER THORACIC CORD:  Normal signal within the visualized cord  No convincing evidence for demyelinating disease      PREVERTEBRAL AND PARASPINAL SOFT TISSUES:  Normal      VISUALIZED POSTERIOR FOSSA:  The visualized posterior fossa demonstrates no abnormal signal      CERVICAL DISC SPACES:     C2-C3:  Normal      C3-C4:  Normal        C4-C5: Normal     C5-C6:  Circumferential bulging of the disc, marginal osteophytes, AP dimension of the canal is reduced to estimated 8-9 mm      C6-C7:  Minor bulge      C7-T1:  Normal      UPPER THORACIC DISC SPACES:  Normal      POSTCONTRAST IMAGING:  Normal      IMPRESSION:     No convincing evidence for demyelinating disease  Cervical spondylosis and osteoarthritis with minor canal stenosis at C5-C6             MRI THORACIC SPINE WITH AND WITHOUT CONTRAST     INDICATION: G37 9: Demyelinating disease of central nervous system, unspecified      COMPARISON:  Contemporary MR cervical spine     TECHNIQUE:  Sagittal T1, sagittal T2, sagittal inversion recovery, axial T2,  axial 2D MERGE    Sagittal and axial T1 postcontrast      IV Contrast:  6 mL of gadobutrol injection (MULTI-DOSE)      IMAGE QUALITY: Diagnostic      FINDINGS:     ALIGNMENT:  Normal alignment of the thoracic spine  No compression fracture  No subluxation  No evidence of scoliosis      MARROW SIGNAL:  Normal marrow signal is identified within the visualized bony structures  No discrete marrow lesion      THORACIC CORD:  Normal signal within the thoracic cord  No convincing evidence for demyelinating disease      PREVERTEBRAL AND PARASPINAL SOFT TISSUES:   Normal      THORACIC DEGENERATIVE CHANGE:  No disc herniation, canal stenosis or foraminal narrowing  No degenerative changes      POSTCONTRAST:  No abnormal enhancement      IMPRESSION:     Normal enhanced MRI of the thoracic spine            MRI BRAIN WITH AND WITHOUT CONTRAST     INDICATION: H93 13: Tinnitus, bilateral      COMPARISON:  Head CT from July 27, 2009      TECHNIQUE:  Sagittal T1, axial T2, axial FLAIR, axial T1, axial [place], axial diffusion  Sagittal, axial T1 postcontrast   Axial bravo postcontrast with coronal reconstructions        IV Contrast:  7 mL of gadobutrol injection (MULTI-DOSE)      IMAGE QUALITY:   Diagnostic      FINDINGS:     BRAIN PARENCHYMA:  Diffusion-weighted imaging is normal   There is a nonspecific focus of T2 prolongation in the right pericallosal white matter on image 20 of series 4  A subcentimeter round focus of T2 prolongation is also noted within the deep left   posterior parietal white matter on image 18 of series 4  There is a poorly defined subcentimeter focus of T2 prolongation within the right lateral cerebellar white matter on image 9 of series 4      Focal subcentimeter nodular tissue is noted in the left posterior periventricular location with a slightly irregular contour to the left atrium suggesting a developmental abnormality            Postcontrast imaging of the brain demonstrates no abnormal enhancement      VENTRICLES:  Slight asymmetric contour to the margins of the left atria    Prominence of the extra-axial spaces at the vertex  SELLA AND PITUITARY GLAND:  Normal      ORBITS:  Suggestion of left ocular banding  Neither lens is well defined      PARANASAL SINUSES:  Mild inflammatory mucosal thickening within the ethmoid air cells  Small fluid level in the dependent left maxillary sinus      VASCULATURE:  Evaluation of the major intracranial vasculature demonstrates appropriate flow voids      CALVARIUM AND SKULL BASE:  Normal      EXTRACRANIAL SOFT TISSUES:  Normal      IMPRESSION:        1  Nonspecific cerebral and cerebellar white matter signal abnormality raising the question of demyelinating disease of multiple sclerosis  Clinical and serologic correlation is recommended  Other etiologies such as migraine induced white matter   changes, microangiopathic disease, Lyme's disease and vasculitis are alternative differential considerations in the appropriate clinical setting  2   Developmental abnormality of the left posterior periventricular parenchyma    3   No pathologic intracranial enhancement

## 2021-03-08 ENCOUNTER — HOSPITAL ENCOUNTER (OUTPATIENT)
Dept: MAMMOGRAPHY | Facility: CLINIC | Age: 51
Discharge: HOME | End: 2021-03-08
Payer: COMMERCIAL

## 2021-03-08 ENCOUNTER — HOSPITAL ENCOUNTER (OUTPATIENT)
Dept: ULTRASOUND IMAGING | Facility: CLINIC | Age: 51
Discharge: HOME | End: 2021-03-08
Payer: COMMERCIAL

## 2021-03-08 VITALS — HEIGHT: 65 IN | WEIGHT: 140 LBS | BODY MASS INDEX: 23.32 KG/M2

## 2021-03-08 DIAGNOSIS — R92.8 ABNORMAL MAMMOGRAM: ICD-10-CM

## 2021-03-08 PROCEDURE — 77066 DX MAMMO INCL CAD BI: CPT

## 2021-03-08 PROCEDURE — G0279 TOMOSYNTHESIS, MAMMO: HCPCS

## 2021-03-11 ENCOUNTER — TELEPHONE (OUTPATIENT)
Dept: RADIOLOGY | Facility: CLINIC | Age: 51
End: 2021-03-11

## 2021-03-11 NOTE — TELEPHONE ENCOUNTER
----- Message from Shivam Reeves sent at 3/11/2021 10:29 AM EST -----  Regarding: RE: Visit Follow-Up Question  Contact: [phone]  Good Morning! Dr Ghanshyam Kolb  I just have some questions if I were to get the procedure with you  Is this done in your office and how long does it take and will I be able to drive or do I need a ?     Elisabet Lucio

## 2021-03-15 NOTE — TELEPHONE ENCOUNTER
Patient called returning the nurses call  Please be advise thank you        Please call patient back @ [phone]

## 2021-03-18 ENCOUNTER — TELEPHONE (OUTPATIENT)
Dept: PAIN MEDICINE | Facility: CLINIC | Age: 51
End: 2021-03-18

## 2021-03-18 DIAGNOSIS — M50.120 CERVICAL DISC DISORDER WITH RADICULOPATHY OF MID-CERVICAL REGION: Primary | ICD-10-CM

## 2021-03-18 NOTE — TELEPHONE ENCOUNTER
S/w pt, answered all questions pertaining to STORM as discussed during office visit  Pt verbalized understanding, pt states she would be interested in STORM at this time if recommended  Please advise, thank you

## 2021-03-22 NOTE — TELEPHONE ENCOUNTER
Scheduled pt for Natalie for 4/16/21  Pt denies rx blood thinners but is taking Ibuprofen and has been instructed to hold for 1 day with last dose on 4/14/21  Went over pre-procedure instructions below:  Nothing to eat or drink 1 hr prior to procedure  Need to arrange transportation  Proper clothing for procedure  If ill or placed on antibiotics please call to reschedule  Covid/travel/ and vaccine instructions

## 2021-03-25 ENCOUNTER — TELEPHONE (OUTPATIENT)
Dept: FAMILY MEDICINE CLINIC | Facility: CLINIC | Age: 51
End: 2021-03-25

## 2021-03-25 DIAGNOSIS — H92.02 LEFT EAR PAIN: Primary | ICD-10-CM

## 2021-03-25 DIAGNOSIS — M54.50 CHRONIC BILATERAL LOW BACK PAIN WITHOUT SCIATICA: ICD-10-CM

## 2021-03-25 DIAGNOSIS — M54.12 CERVICAL RADICULOPATHY: ICD-10-CM

## 2021-03-25 DIAGNOSIS — G89.29 CHRONIC BILATERAL LOW BACK PAIN WITHOUT SCIATICA: ICD-10-CM

## 2021-03-25 RX ORDER — IBUPROFEN 600 MG/1
TABLET ORAL
Qty: 30 TABLET | Refills: 0 | Status: SHIPPED | OUTPATIENT
Start: 2021-03-25 | End: 2021-06-01

## 2021-03-25 RX ORDER — AMOXICILLIN 500 MG/1
500 CAPSULE ORAL EVERY 8 HOURS SCHEDULED
Qty: 21 CAPSULE | Refills: 0 | Status: SHIPPED | OUTPATIENT
Start: 2021-03-25 | End: 2021-04-01

## 2021-03-25 NOTE — TELEPHONE ENCOUNTER
Then she has to come in for her yearly physical with me this year  Also regarding her ear pain - not sure what she is asking- more antibiotics? Pain meds?  Not sure what to give her as I dont know /and cannot see what is going on with her ear

## 2021-03-25 NOTE — TELEPHONE ENCOUNTER
She canceled her appointment with me yesterday and she has an appt with another physician   Does she want to stay with us or change PCP as she cannot have 2 PCPs

## 2021-03-25 NOTE — TELEPHONE ENCOUNTER
Pt having left pain and pressure in her left ear and asked if possible for you to call in something to [facility] in [place]? Pt states she had a root canal done a couple weeks ago and was on an antibiotic for 3 days only wasn't sure if it is all related   pls advise

## 2021-03-25 NOTE — TELEPHONE ENCOUNTER
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  She tried to call pharmacy but they said we have to call or send them for the rx  pls advise

## 2021-03-26 ENCOUNTER — TELEPHONE (OUTPATIENT)
Dept: FAMILY MEDICINE CLINIC | Facility: CLINIC | Age: 51
End: 2021-03-26

## 2021-03-26 DIAGNOSIS — J06.9 URI, ACUTE: ICD-10-CM

## 2021-03-26 RX ORDER — AZITHROMYCIN 250 MG/1
TABLET, FILM COATED ORAL
Qty: 6 TABLET | Refills: 0 | Status: SHIPPED | OUTPATIENT
Start: 2021-03-26 | End: 2021-03-31

## 2021-03-26 NOTE — TELEPHONE ENCOUNTER
[place] Mignon was prescribed amoxicillin yesterday by Dr Negrete Res  It upset her stomach  She is asking if a Rosana Munoz can be sent to the pharmacy instead?  [facility] in [place] Please advise

## 2021-04-16 ENCOUNTER — HOSPITAL ENCOUNTER (OUTPATIENT)
Dept: RADIOLOGY | Facility: CLINIC | Age: 51
Discharge: HOME | End: 2021-04-16
Attending: ANESTHESIOLOGY | Admitting: ANESTHESIOLOGY
Payer: COMMERCIAL

## 2021-04-16 VITALS
SYSTOLIC BLOOD PRESSURE: 142 MMHG | TEMPERATURE: 98 F | HEART RATE: 82 BPM | DIASTOLIC BLOOD PRESSURE: 87 MMHG | OXYGEN SATURATION: 98 % | RESPIRATION RATE: 18 BRPM

## 2021-04-16 DIAGNOSIS — M50.120 CERVICAL DISC DISORDER WITH RADICULOPATHY OF MID-CERVICAL REGION: ICD-10-CM

## 2021-04-16 PROCEDURE — 62321 NJX INTERLAMINAR CRV/THRC: CPT | Performed by: ANESTHESIOLOGY

## 2021-04-16 RX ORDER — METHYLPREDNISOLONE ACETATE 80 MG/ML
80 INJECTION, SUSPENSION INTRA-ARTICULAR; INTRALESIONAL; INTRAMUSCULAR; PARENTERAL; SOFT TISSUE ONCE
Status: COMPLETED | OUTPATIENT
Start: 2021-04-16 | End: 2021-04-16

## 2021-04-16 RX ADMIN — IOHEXOL 1 ML: 300 INJECTION, SOLUTION INTRAVENOUS at 12:10

## 2021-04-16 RX ADMIN — METHYLPREDNISOLONE ACETATE 80 MG: 80 INJECTION, SUSPENSION INTRA-ARTICULAR; INTRALESIONAL; INTRAMUSCULAR; PARENTERAL; SOFT TISSUE at 12:10

## 2021-04-16 NOTE — H&P
History of Present Illness: The patient is a 45 y o  female who presents with complaints of neck pain secondary to cervical spondylosis and is here today for cervical epidural steroid injection    Patient Active Problem List   Diagnosis    Migraine without aura, intractable    Insomnia    Anemia    CNS demyelination (HCC)    Allodynia    Orthostatic hypotension    Episodic cluster headache, not intractable    Acute bacterial conjunctivitis of both eyes       Past Medical History:   Diagnosis Date    Abnormal Pap smear of cervix     Anemia     Anxiety     Complete uterovaginal prolapse     Constipation     HPV (human papilloma virus) infection     Migraines     PONV (postoperative nausea and vomiting)     Seasonal allergies     Tinnitus     Varicella     Wears contact lenses        Past Surgical History:   Procedure Laterality Date    CATARACT EXTRACTION Bilateral      SECTION      COLONOSCOPY      CYSTOSCOPY N/A 2018    Procedure: CYSTOSCOPY;  Surgeon: Whitney Sharpe MD;  [facility];  Service: Gynecology    EYE SURGERY      HYSTERECTOMY      [place] REVAGINAL PROLAPSE,SACROSP LIG Right 2018    Procedure: FIXATION LIGAMENT SACROSPINOUS, RIGHT;  Surgeon: Whitney Sharpe MD;  [facility];  Service: Gynecology    SC VAGINAL HYSTERECTOMY,UTERUS 250 GMS/< N/A 2018    Procedure: TOTAL VAG  HYSTERECTOMY;  Surgeon: Whitney Sharpe MD;  [facility];  Service: Gynecology    WISDOM TOOTH EXTRACTION           Current Outpatient Medications:     ALPRAZolam (XANAX) 0 25 mg tablet, TAKE 1 TABLET BY MOUTH AT BEDTIME AS NEEDED FOR ANXIETY, Disp: 30 tablet, Rfl: 0    ibuprofen (MOTRIN) 600 mg tablet, TAKE ONE TABLET BY MOUTH EVERY 6 HOURS AS NEEDED FOR MILD PAIN, Disp: 30 tablet, Rfl: 0    Multiple Vitamin (MULTIVITAMIN) capsule, Take 1 capsule by mouth daily, Disp: , Rfl:     rizatriptan (MAXALT-MLT) 10 MG disintegrating tablet, DISSOLVE ONE TABLET BY MOUTH ONCE AS NEEDED FOR MIGRAINE FOR UP TO ONE DOSE MAY REPEAT IN IN TWO HOURS IF NEEDED, Disp: 9 tablet, Rfl: 0    Current Facility-Administered Medications:     iohexol (OMNIPAQUE) 300 mg/mL injection 50 mL, 50 mL, Epidural, Once, Kim Julio MD    methylPREDNISolone acetate (DEPO-MEDROL) injection 80 mg, 80 mg, Epidural, Once, Kim Julio MD    Allergies   Allergen Reactions    Latex Rash    Sulfamethoxazole-Trimethoprim Rash       Physical Exam:   Vitals:    04/16/21 1154   BP: 132/78   Pulse: (!) 107   Resp: 18   Temp: 98 °F (36 7 °C)   SpO2: 95%     General: Awake, Alert, Oriented x 3, Mood and affect appropriate  Respiratory: Respirations even and unlabored  Cardiovascular: Peripheral pulses intact; no edema  Musculoskeletal Exam:  Neck pain    ASA Score: 2    Patient/Chart Verification  Patient ID Verified: Verbal  ID Band Applied: No  Consents Confirmed: Procedural, To be obtained in the Pre-Procedure area  H&P( within 30 days) Verified: To be obtained in the Pre-Procedure area  Allergies Reviewed: Yes  Anticoag/NSAID held?: No(ibuprofen held 1 week )  Currently on antibiotics?: No    Assessment:   1   Cervical disc disorder with radiculopathy of mid-cervical region        Plan: STORM

## 2021-04-16 NOTE — DISCHARGE INSTR - LAB
Epidural Steroid Injection   WHAT YOU NEED TO KNOW:   An epidural steroid injection (HENRRY) is a procedure to inject steroid medicine into the epidural space  The epidural space is between your spinal cord and vertebrae  Steroids reduce inflammation and fluid buildup in your spine that may be causing pain  You may be given pain medicine along with the steroids  ACTIVITY  · Do not drive or operate machinery today  · No strenuous activity today - bending, lifting, etc   · You may resume normal activites starting tomorrow - start slowly and as tolerated  · You may shower today, but no tub baths or hot tubs  · You may have numbness for several hours from the local anesthetic  Please use caution and common sense, especially with weight-bearing activities  CARE OF THE INJECTION SITE  · If you have soreness or pain, apply ice to the area today (20 minutes on/20 minutes off)  · Starting tomorrow, you may use warm, moist heat or ice if needed  · You may have an increase or change in your discomfort for 36-48 hours after your treatment  · Apply ice and continue with any pain medication you have been prescribed  · Notify [facility] if you have any of the following: redness, drainage, swelling, headache, stiff neck or fever above 100°F     SPECIAL INSTRUCTIONS  · Our office will contact you in approximately 7 days for a progress report  MEDICATIONS  · Continue to take all routine medications  · Our office may have instructed you to hold some medications  As no general anesthesia was used in today's procedure, you should not experience any side effects related to anesthesia  If you have a problem specifically related to your procedure, please call our office at [phone]  Problems not related to your procedure should be directed to your primary care physician

## 2021-04-23 ENCOUNTER — TELEPHONE (OUTPATIENT)
Dept: RADIOLOGY | Facility: CLINIC | Age: 51
End: 2021-04-23

## 2021-04-23 NOTE — TELEPHONE ENCOUNTER
Patient states that she feels a little better  Patient understands that it takes two weeks for the full effect  Patient reports at this time she has received 50% relief  Patient asking if she can continue her massages? Patient states that she feels that they really helped her

## 2021-04-23 NOTE — TELEPHONE ENCOUNTER
MD Aware   Give steroid a little more time and please f/u with patient next week    Yes, ok to continue with massages

## 2021-05-04 DIAGNOSIS — G43.019 MIGRAINE WITHOUT AURA, INTRACTABLE: ICD-10-CM

## 2021-05-04 NOTE — TELEPHONE ENCOUNTER
Patient stated that her pain level is 5/10    improvement is decreasing and shes having more pain  Ryan Mariella improvement 30%   Thank you      [phone]

## 2021-05-05 RX ORDER — RIZATRIPTAN BENZOATE 10 MG/1
TABLET, ORALLY DISINTEGRATING ORAL
Qty: 9 TABLET | Refills: 0 | Status: SHIPPED | OUTPATIENT
Start: 2021-05-05 | End: 2021-08-02

## 2021-05-06 ENCOUNTER — TELEMEDICINE (OUTPATIENT)
Dept: FAMILY MEDICINE CLINIC | Facility: CLINIC | Age: 51
End: 2021-05-06
Payer: COMMERCIAL

## 2021-05-06 VITALS — WEIGHT: 140.6 LBS | BODY MASS INDEX: 23.43 KG/M2 | HEIGHT: 65 IN

## 2021-05-06 DIAGNOSIS — G43.019 MIGRAINE WITHOUT AURA, INTRACTABLE: ICD-10-CM

## 2021-05-06 DIAGNOSIS — J06.9 ACUTE URI: Primary | ICD-10-CM

## 2021-05-06 DIAGNOSIS — F51.01 PRIMARY INSOMNIA: ICD-10-CM

## 2021-05-06 DIAGNOSIS — Z12.11 COLON CANCER SCREENING: ICD-10-CM

## 2021-05-06 PROBLEM — H10.33 ACUTE BACTERIAL CONJUNCTIVITIS OF BOTH EYES: Status: RESOLVED | Noted: 2020-04-14 | Resolved: 2021-05-06

## 2021-05-06 PROCEDURE — 3725F SCREEN DEPRESSION PERFORMED: CPT | Performed by: FAMILY MEDICINE

## 2021-05-06 PROCEDURE — 1036F TOBACCO NON-USER: CPT | Performed by: FAMILY MEDICINE

## 2021-05-06 PROCEDURE — 3008F BODY MASS INDEX DOCD: CPT | Performed by: FAMILY MEDICINE

## 2021-05-06 PROCEDURE — 99214 OFFICE O/P EST MOD 30 MIN: CPT | Performed by: FAMILY MEDICINE

## 2021-05-06 RX ORDER — CALCIUM CARB/VITAMIN D3/VIT K1 500-500-40
TABLET,CHEWABLE ORAL
COMMUNITY
End: 2021-07-12 | Stop reason: ALTCHOICE

## 2021-05-06 NOTE — PROGRESS NOTES
Virtual Regular Visit      Assessment/Plan:    Halie Linda was seen today for virtual regular visit  Diagnoses and all orders for this visit:    Acute URI  -     Novel Coronavirus (Covid-19),PCR [facility] - Collected at   [facility]; Future    Migraine without aura, intractable  Comments:  stable  Imitrex PRN    Primary insomnia  Comments:  stable  Xanax PRN    Colon cancer screening  -     Cologuard; Future             Reason for visit is   Chief Complaint   Patient presents with    Virtual Regular Visit        Encounter provider Zabrina Bruce MD    Provider located at [address] [address]      Recent Visits  No visits were found meeting these conditions  Showing recent visits within past 7 days and meeting all other requirements     Today's Visits  Date Type Provider Dept   05/06/21 Telemedicine Zabrina Bruce MD [facility] today's visits and meeting all other requirements     Future Appointments  No visits were found meeting these conditions  Showing future appointments within next 150 days and meeting all other requirements        The patient was identified by name and date of birth  Shira Robb was informed that this is a telemedicine visit and that the visit is being conducted through [employer] and patient was informed that this is not a secure, HIPAA-compliant platform  She agrees to proceed     My office door was closed  No one else was in the room  She acknowledged consent and understanding of privacy and security of the video platform  The patient has agreed to participate and understands they can discontinue the visit at any time  Patient is aware this is a billable service  Subjective  Shira Robb is a 45 y o  female fever and chills for 2 days     Took antibiotics for her crown in her mouth last month  Started z pack yesterday for her teeth and feeling better today   Ear pain is better today No exposure to covid recently   Fever  This is a new problem  The current episode started yesterday  Associated symptoms include congestion  Pertinent negatives include no abdominal pain, anorexia, arthralgias, change in bowel habit, chest pain, chills, coughing, diaphoresis, fatigue, fever, headaches, joint swelling, myalgias, nausea, neck pain, numbness, rash, sore throat, swollen glands, urinary symptoms, vertigo, visual change, vomiting or weakness  Nothing aggravates the symptoms  Past Medical History:   Diagnosis Date    Abnormal Pap smear of cervix     Anemia     Anxiety     Complete uterovaginal prolapse     Constipation     HPV (human papilloma virus) infection     Migraines     PONV (postoperative nausea and vomiting)     Seasonal allergies     Tinnitus     Varicella     Wears contact lenses        Past Surgical History:   Procedure Laterality Date    CATARACT EXTRACTION Bilateral      SECTION      COLONOSCOPY      CYSTOSCOPY N/A 2018    Procedure: CYSTOSCOPY;  Surgeon: Areli Irby MD;  [facility];  Service: Gynecology    EYE SURGERY      HYSTERECTOMY  2018    [place] LIG Right 2018    Procedure: FIXATION LIGAMENT SACROSPINOUS, RIGHT;  Surgeon: Areli Irby MD;  [facility];  Service: Gynecology    [place] VAGINAL HYSTERECTOMY,UTERUS 250 GMS/< N/A 2018    Procedure: TOTAL VAG  HYSTERECTOMY;  Surgeon: Areli Irby MD;  [facility];  Service: Gynecology    WISDOM TOOTH EXTRACTION         Current Outpatient Medications   Medication Sig Dispense Refill    ALPRAZolam (XANAX) 0 25 mg tablet TAKE 1 TABLET BY MOUTH AT BEDTIME AS NEEDED FOR ANXIETY 30 tablet 0    ibuprofen (MOTRIN) 600 mg tablet TAKE ONE TABLET BY MOUTH EVERY 6 HOURS AS NEEDED FOR MILD PAIN 30 tablet 0    MAGNESIUM PO Take by mouth      Multiple Vitamins-Minerals (Multivitamin) LIQD Take by mouth      rizatriptan (MAXALT-MLT) 10 MG disintegrating tablet DISSOLVE ONE TABLET BY MOUTH ONCE AS NEEDED FOR MIGRAINE FOR UP TO ONE DOSE MAY REPEAT IN IN TWO HOURS IF NEEDED 9 tablet 0    Multiple Vitamin (MULTIVITAMIN) capsule Take 1 capsule by mouth daily       No current facility-administered medications for this visit  Allergies   Allergen Reactions    Latex Rash    Sulfamethoxazole-Trimethoprim Rash       Review of Systems   Constitutional: Negative for chills, diaphoresis, fatigue and fever  HENT: Positive for congestion  Negative for sore throat  Respiratory: Negative for cough  Cardiovascular: Negative for chest pain  Gastrointestinal: Negative for abdominal pain, anorexia, change in bowel habit, nausea and vomiting  Musculoskeletal: Negative for arthralgias, joint swelling, myalgias and neck pain  Skin: Negative for rash  Neurological: Negative for vertigo, weakness, numbness and headaches  Video Exam    Vitals:    05/06/21 1442   Weight: 63 8 kg (140 lb 9 6 oz)   Height: 5' 5" (1 651 m)       Physical Exam  Constitutional:       Appearance: Normal appearance  Pulmonary:      Effort: Pulmonary effort is normal  No respiratory distress  Neurological:      General: No focal deficit present  Mental Status: She is alert and oriented to person, place, and time  Psychiatric:         Mood and Affect: Mood normal          Behavior: Behavior normal           I spent 15 minutes directly with the patient during this visit      [facility] acknowledges that she has consented to an online visit or consultation  She understands that the online visit is based solely on information provided by her, and that, in the absence of a face-to-face physical evaluation by the physician, the diagnosis she receives is both limited and provisional in terms of accuracy and completeness  This is not intended to replace a full medical face-to-face evaluation by the physician   Shira Robb understands and accepts these terms

## 2021-05-07 NOTE — TELEPHONE ENCOUNTER
I asked the patient if she wanted another injection   She said she is unsure because she has been having a burning sensation on her right side   She said her neck really hurts too   So she wanted to know if that can be some kind of side effect from the injection she just had

## 2021-05-11 NOTE — TELEPHONE ENCOUNTER
No the injection should help burning pain    Just have her scheduled for re-eval in the office please

## 2021-05-13 NOTE — TELEPHONE ENCOUNTER
S/w pt, offered sooner appt with nurse practitioners  Pt declined, only willing to see FQ  Pt scheduled for 7/14 at 9:45, requesting sooner appt if one does become available, thank you

## 2021-05-13 NOTE — TELEPHONE ENCOUNTER
Second attempt - LMOM for pt to cb  CB# and [place] provided  * If pt calls back please schedule re-eval OV, thank you

## 2021-05-13 NOTE — TELEPHONE ENCOUNTER
Patient called & Dr Tiffanie Cleveland schedule is out to mid July  Patient would like to know what can she do to possibly see him sooner   Please advise, thx    Call back# [phone]

## 2021-05-30 DIAGNOSIS — G89.29 CHRONIC BILATERAL LOW BACK PAIN WITHOUT SCIATICA: ICD-10-CM

## 2021-05-30 DIAGNOSIS — M54.12 CERVICAL RADICULOPATHY: ICD-10-CM

## 2021-05-30 DIAGNOSIS — M54.50 CHRONIC BILATERAL LOW BACK PAIN WITHOUT SCIATICA: ICD-10-CM

## 2021-06-01 RX ORDER — IBUPROFEN 600 MG/1
TABLET ORAL
Qty: 30 TABLET | Refills: 0 | Status: SHIPPED | OUTPATIENT
Start: 2021-06-01 | End: 2021-08-30

## 2021-06-17 ENCOUNTER — TELEPHONE (OUTPATIENT)
Dept: FAMILY MEDICINE CLINIC | Facility: CLINIC | Age: 51
End: 2021-06-17

## 2021-06-17 NOTE — TELEPHONE ENCOUNTER
Patient called and stated that she had chest pains on and off last night but does not have them today, she just has a burning feeling  she has no other symptoms  She wanted to know if you can see her or what she should do         I did advise patient that she should be seen in the ER but she did not want to go and wanted to know what you suggest

## 2021-06-17 NOTE — TELEPHONE ENCOUNTER
It could be acid reflex   She can try nexium or pepcid over the counter daily for 2 weeks and avoid triggers

## 2021-07-09 ENCOUNTER — TELEPHONE (OUTPATIENT)
Dept: FAMILY MEDICINE CLINIC | Facility: CLINIC | Age: 51
End: 2021-07-09

## 2021-07-09 ENCOUNTER — APPOINTMENT (OUTPATIENT)
Dept: LAB | Facility: CLINIC | Age: 51
End: 2021-07-09
Payer: COMMERCIAL

## 2021-07-09 DIAGNOSIS — E83.52 HYPERCALCEMIA: ICD-10-CM

## 2021-07-09 LAB
ALBUMIN SERPL BCP-MCNC: 3.9 G/DL (ref 3.5–5)
ALP SERPL-CCNC: 92 U/L (ref 46–116)
ALT SERPL W P-5'-P-CCNC: 26 U/L (ref 12–78)
ANION GAP SERPL CALCULATED.3IONS-SCNC: 2 MMOL/L (ref 4–13)
AST SERPL W P-5'-P-CCNC: 13 U/L (ref 5–45)
BILIRUB SERPL-MCNC: 0.23 MG/DL (ref 0.2–1)
BUN SERPL-MCNC: 11 MG/DL (ref 5–25)
CALCIUM SERPL-MCNC: 10.6 MG/DL (ref 8.3–10.1)
CHLORIDE SERPL-SCNC: 108 MMOL/L (ref 100–108)
CO2 SERPL-SCNC: 28 MMOL/L (ref 21–32)
CREAT SERPL-MCNC: 0.55 MG/DL (ref 0.6–1.3)
GFR SERPL CREATININE-BSD FRML MDRD: 108 ML/MIN/1.73SQ M
GLUCOSE SERPL-MCNC: 83 MG/DL (ref 65–140)
POTASSIUM SERPL-SCNC: 4.3 MMOL/L (ref 3.5–5.3)
PROT SERPL-MCNC: 7.2 G/DL (ref 6.4–8.2)
SODIUM SERPL-SCNC: 138 MMOL/L (ref 136–145)

## 2021-07-09 PROCEDURE — 80053 COMPREHEN METABOLIC PANEL: CPT

## 2021-07-09 PROCEDURE — 36415 COLL VENOUS BLD VENIPUNCTURE: CPT

## 2021-07-09 PROCEDURE — 83970 ASSAY OF PARATHORMONE: CPT

## 2021-07-10 LAB — PTH-INTACT SERPL-MCNC: 169.5 PG/ML (ref 18.4–80.1)

## 2021-07-12 ENCOUNTER — OFFICE VISIT (OUTPATIENT)
Dept: FAMILY MEDICINE CLINIC | Facility: CLINIC | Age: 51
End: 2021-07-12
Payer: COMMERCIAL

## 2021-07-12 VITALS
RESPIRATION RATE: 16 BRPM | DIASTOLIC BLOOD PRESSURE: 82 MMHG | HEART RATE: 81 BPM | TEMPERATURE: 97.4 F | HEIGHT: 66 IN | OXYGEN SATURATION: 99 % | SYSTOLIC BLOOD PRESSURE: 128 MMHG | WEIGHT: 139.6 LBS | BODY MASS INDEX: 22.43 KG/M2

## 2021-07-12 DIAGNOSIS — E21.3 HYPERPARATHYROIDISM (HCC): ICD-10-CM

## 2021-07-12 DIAGNOSIS — K21.9 GASTROESOPHAGEAL REFLUX DISEASE WITHOUT ESOPHAGITIS: ICD-10-CM

## 2021-07-12 DIAGNOSIS — Z00.00 ANNUAL PHYSICAL EXAM: Primary | ICD-10-CM

## 2021-07-12 PROCEDURE — 99396 PREV VISIT EST AGE 40-64: CPT | Performed by: FAMILY MEDICINE

## 2021-07-12 PROCEDURE — 3008F BODY MASS INDEX DOCD: CPT | Performed by: ANESTHESIOLOGY

## 2021-07-12 RX ORDER — FAMOTIDINE 20 MG/1
20 TABLET, FILM COATED ORAL 2 TIMES DAILY
Qty: 60 TABLET | Refills: 0 | Status: SHIPPED | OUTPATIENT
Start: 2021-07-12 | End: 2021-10-15

## 2021-07-12 NOTE — PROGRESS NOTES
[address]    NAME: Stacey Peguero  AGE: 45 y o  SEX: female  : 1970     DATE: 2021     Assessment and Plan:     Problem List Items Addressed This Visit        Endocrine    Hyperparathyroidism ([address] )     Diagnosed 2021  Referral to endocrinology  CT scan ordered to look for adenoma          Relevant Orders    CT parathyroid study w wo contrast    Ambulatory referral to Endocrinology      Other Visit Diagnoses     Annual physical exam    -  Primary    Gastroesophageal reflux disease without esophagitis        Relevant Medications    famotidine (PEPCID) 20 mg tablet          Immunizations and preventive care screenings were discussed with patient today  Appropriate education was printed on patient's after visit summary  Counseling:  Alcohol/drug use: discussed moderation in alcohol intake, the recommendations for healthy alcohol use, and avoidance of illicit drug use  Dental Health: discussed importance of regular tooth brushing, flossing, and dental visits  Injury prevention: discussed safety/seat belts, safety helmets, smoke detectors, carbon dioxide detectors, and smoking near bedding or upholstery  Sexual health: discussed sexually transmitted diseases, partner selection, use of condoms, avoidance of unintended pregnancy, and contraceptive alternatives  · Exercise: the importance of regular exercise/physical activity was discussed  Recommend exercise 3-5 times per week for at least 30 minutes  No follow-ups on file  Chief Complaint:     Chief Complaint   Patient presents with    Physical Exam     Patient is here today for an annual exam       History of Present Illness:     Adult Annual Physical   Patient here for a comprehensive physical exam  The patient reports the followings    Trouble swallowing for the last few days   Eating makes it worse- something stuck in back of her throat  Didn't try anything   Recent PTH level is elevated         Diet and Physical Activity  · Diet/Nutrition: well balanced diet and consuming 3-5 servings of fruits/vegetables daily  · Exercise: walking  Depression Screening  PHQ-9 Depression Screening    PHQ-9:   Frequency of the following problems over the past two weeks:           General Health  · Sleep: sleeps well and gets 7-8 hours of sleep on average  · Hearing: normal - bilateral   · Vision: goes for regular eye exams, most recent eye exam <1 year ago and wears contacts  · Dental: regular dental visits and brushes teeth twice daily  /GYN Health  · Patient is: postmenopausal  · Last menstrual period: 2018  · Contraceptive method: partial hysterectomy 2018  Review of Systems:     Review of Systems   Constitutional: Negative  HENT: Negative  Eyes: Negative  Respiratory: Negative  Cardiovascular: Negative  Gastrointestinal: Negative  Endocrine: Negative  Genitourinary: Negative  Musculoskeletal: Negative  Skin: Negative  Allergic/Immunologic: Negative  Neurological: Negative  Hematological: Negative  Psychiatric/Behavioral: Negative         Past Medical History:     Past Medical History:   Diagnosis Date    Abnormal Pap smear of cervix     Anemia     Anxiety     Complete uterovaginal prolapse     Constipation     HPV (human papilloma virus) infection     Migraines     PONV (postoperative nausea and vomiting)     Seasonal allergies     Tinnitus     Varicella     Wears contact lenses       Past Surgical History:     Past Surgical History:   Procedure Laterality Date    CATARACT EXTRACTION Bilateral      SECTION      COLONOSCOPY      CYSTOSCOPY N/A 2018    Procedure: CYSTOSCOPY;  Surgeon: Josue Yañez MD;  [facility];  Service: Gynecology    EYE SURGERY      HYSTERECTOMY  2018    [place] REVAGINAL PROLAPSE,SACROSP LIG Right 2018    Procedure: FIXATION LIGAMENT SACROSPINOUS, RIGHT;  Surgeon: Jessie Ramirez MD;  [facility];  Service: Gynecology    [place] VAGINAL HYSTERECTOMY,UTERUS 250 GMS/< N/A 9/25/2018    Procedure: TOTAL VAG  HYSTERECTOMY;  Surgeon: Jessie Ramirez MD;  [facility];  Service: Gynecology    WISDOM TOOTH EXTRACTION        Social History:     Social History     Socioeconomic History    Marital status: Single     Spouse name: None    Number of children: 3    Years of education: None    Highest education level: None   Occupational History    None   Tobacco Use    Smoking status: Never Smoker    Smokeless tobacco: Never Used   Substance and Sexual Activity    Alcohol use: No    Drug use: No    Sexual activity: Not Currently   Other Topics Concern    None   Social History Narrative    Caffeine use    Uses safety equipment seatbelts     Social Determinants of Health     Financial Resource Strain:     Difficulty of Paying Living Expenses:    Food Insecurity:     Worried About Running Out of Food in the Last Year:     Ran Out of Food in the Last Year:    Transportation Needs:     Lack of Transportation (Medical):      Lack of Transportation (Non-Medical):    Physical Activity:     Days of Exercise per Week:     Minutes of Exercise per Session:    Stress:     Feeling of Stress :    Social Connections:     Frequency of Communication with Friends and Family:     Frequency of Social Gatherings with Friends and Family:     Attends [employer] Services:     Active Member of Clubs or Organizations:     Attends Club or Organization Meetings:     Marital Status:    Intimate Partner Violence:     Fear of Current or Ex-Partner:     Emotionally Abused:     Physically Abused:     Sexually Abused:       Family History:     Family History   Problem Relation Age of Onset    Kidney failure Father         acute per allscripts    Diabetes type II Father     No Known Problems Maternal Grandmother     No Known Problems Maternal Grandfather     No Known Problems Paternal Grandmother     Colon cancer Paternal Grandfather 58    No Known Problems Brother     No Known Problems Son     No Known Problems Son     No Known Problems Son     No Known Problems Paternal Aunt       Current Medications:     Current Outpatient Medications   Medication Sig Dispense Refill    ALPRAZolam (XANAX) 0 25 mg tablet TAKE 1 TABLET BY MOUTH AT BEDTIME AS NEEDED FOR ANXIETY 30 tablet 0    ibuprofen (MOTRIN) 600 mg tablet TAKE ONE TABLET BY MOUTH EVERY 6 HOURS AS NEEDED FOR MILD PAIN 30 tablet 0    rizatriptan (MAXALT-MLT) 10 MG disintegrating tablet DISSOLVE ONE TABLET BY MOUTH ONCE AS NEEDED FOR MIGRAINE FOR UP TO ONE DOSE MAY REPEAT IN IN TWO HOURS IF NEEDED 9 tablet 0    famotidine (PEPCID) 20 mg tablet Take 1 tablet (20 mg total) by mouth 2 (two) times a day 60 tablet 0    Multiple Vitamin (MULTIVITAMIN) capsule Take 1 capsule by mouth daily (Patient not taking: Reported on 7/12/2021)       No current facility-administered medications for this visit  Allergies: Allergies   Allergen Reactions    Latex Rash    Sulfamethoxazole-Trimethoprim Rash      Physical Exam:     /82 (BP Location: Left arm, Patient Position: Sitting, Cuff Size: Adult)   Pulse 81   Temp (!) 97 4 °F (36 3 °C) (Skin)   Resp 16   Ht 5' 5 63" (1 667 m)   Wt 63 3 kg (139 lb 9 6 oz)   LMP 09/10/2018   SpO2 99%   BMI 22 79 kg/m²     Physical Exam  Constitutional:       Appearance: Normal appearance  She is well-developed  HENT:      Head: Normocephalic and atraumatic  Right Ear: Tympanic membrane normal       Left Ear: Tympanic membrane normal       Mouth/Throat:      Mouth: Mucous membranes are moist    Eyes:      Pupils: Pupils are equal, round, and reactive to light  Cardiovascular:      Rate and Rhythm: Normal rate and regular rhythm  Pulses: Normal pulses  Pulmonary:      Effort: Pulmonary effort is normal  No respiratory distress        Breath sounds: Normal breath sounds  No wheezing  Abdominal:      General: Bowel sounds are normal       Palpations: Abdomen is soft  Musculoskeletal:         General: No deformity  Normal range of motion  Cervical back: Normal range of motion and neck supple  Skin:     General: Skin is warm  Capillary Refill: Capillary refill takes less than 2 seconds  Neurological:      General: No focal deficit present  Mental Status: She is alert and oriented to person, place, and time     Psychiatric:         Mood and Affect: Mood normal          Behavior: Behavior normal           Jayda Ortega MD  [facility] [facility]

## 2021-07-12 NOTE — PATIENT INSTRUCTIONS

## 2021-07-14 ENCOUNTER — OFFICE VISIT (OUTPATIENT)
Dept: PAIN MEDICINE | Facility: CLINIC | Age: 51
End: 2021-07-14
Payer: COMMERCIAL

## 2021-07-14 VITALS
SYSTOLIC BLOOD PRESSURE: 119 MMHG | DIASTOLIC BLOOD PRESSURE: 75 MMHG | HEART RATE: 77 BPM | BODY MASS INDEX: 22.69 KG/M2 | WEIGHT: 139 LBS

## 2021-07-14 DIAGNOSIS — M50.120 CERVICAL DISC DISORDER WITH RADICULOPATHY OF MID-CERVICAL REGION: Primary | ICD-10-CM

## 2021-07-14 DIAGNOSIS — M54.31 SCIATICA OF RIGHT SIDE: ICD-10-CM

## 2021-07-14 PROCEDURE — 99214 OFFICE O/P EST MOD 30 MIN: CPT | Performed by: ANESTHESIOLOGY

## 2021-07-14 PROCEDURE — 1036F TOBACCO NON-USER: CPT | Performed by: ANESTHESIOLOGY

## 2021-07-14 RX ORDER — METHYLPREDNISOLONE 4 MG/1
TABLET ORAL
Qty: 21 TABLET | Refills: 0 | Status: SHIPPED | OUTPATIENT
Start: 2021-07-14 | End: 2021-10-14 | Stop reason: HOSPADM

## 2021-07-14 NOTE — PROGRESS NOTES
Assessment:  1  Cervical disc disorder with radiculopathy of mid-cervical region    2  Sciatica of right side        Plan:  The patient is experiencing ongoing pain in the neck on the right radiating down the right arm but also right-sided sciatica  At this time, I will place her on a Medrol Dosepak to help reduce swelling inflammation  She will call with an update in 2 weeks on how she is doing  If she is not symptomatically better, I discussed repeating the cervical epidural steroid injection  My impressions and treatment recommendations were discussed in detail with the patient who verbalized understanding and had no further questions  Discharge instructions were provided  I personally saw and examined the patient and I agree with the above discussed plan of care  No orders of the defined types were placed in this encounter  New Medications Ordered This Visit   Medications    methylPREDNISolone 4 MG tablet therapy pack     Sig: Use as directed on package     Dispense:  21 tablet     Refill:  0       History of Present Illness:  Jennifer Card is a 45 y o  female who presents for a follow up office visit in regards to Neck Pain and Arm Pain (right side)  The patient is status post a cervical epidural steroid injection in April which only provided about a week relief  She reports ongoing pain in the neck that travels down into the right arm as well as now pain in the low back that travels down the right posterior thigh and calf  Symptoms are dull/aching throbbing with numbness felt in the arm  I have personally reviewed and/or updated the patient's past medical history, past surgical history, family history, social history, current medications, allergies, and vital signs today  Review of Systems   Respiratory: Negative for shortness of breath  Cardiovascular: Negative for chest pain  Gastrointestinal: Negative for constipation, diarrhea, nausea and vomiting     Musculoskeletal: Positive for neck pain  Negative for arthralgias, gait problem, joint swelling and myalgias  Skin: Negative for rash  Neurological: Positive for dizziness and headaches  Negative for seizures and weakness  All other systems reviewed and are negative  Patient Active Problem List   Diagnosis    Migraine without aura, intractable    Insomnia    Anemia    CNS demyelination (HCC)    Allodynia    Orthostatic hypotension    Episodic cluster headache, not intractable    Cervical disc disorder with radiculopathy of mid-cervical region    Hyperparathyroidism ([address] )       Past Medical History:   Diagnosis Date    Abnormal Pap smear of cervix     Anemia     Anxiety     Complete uterovaginal prolapse     Constipation     HPV (human papilloma virus) infection     Migraines     PONV (postoperative nausea and vomiting)     Seasonal allergies     Tinnitus     Varicella     Wears contact lenses        Past Surgical History:   Procedure Laterality Date    CATARACT EXTRACTION Bilateral      SECTION      COLONOSCOPY      CYSTOSCOPY N/A 2018    Procedure: CYSTOSCOPY;  Surgeon: Ramon Ernandez MD;  [facility];  Service: Gynecology    EYE SURGERY      HYSTERECTOMY  2018    [place] LIG Right 2018    Procedure: FIXATION LIGAMENT SACROSPINOUS, RIGHT;  Surgeon: Ramon Ernandez MD;  [facility];  Service: Gynecology    [place] VAGINAL HYSTERECTOMY,UTERUS 250 GMS/< N/A 2018    Procedure: TOTAL VAG  HYSTERECTOMY;  Surgeon: Ramon Ernandez MD;  [facility];  Service: Gynecology    WISDOM TOOTH EXTRACTION         Family History   Problem Relation Age of Onset    Kidney failure Father         acute per allscripts    Diabetes type II Father     No Known Problems Maternal Grandmother     No Known Problems Maternal Grandfather     No Known Problems Paternal Grandmother     Colon cancer Paternal Grandfather 58    No Known Problems Brother     No Known Problems Son     No Known Problems Son     No Known Problems Son     No Known Problems Paternal Aunt        Social History     Occupational History    Not on file   Tobacco Use    Smoking status: Never Smoker    Smokeless tobacco: Never Used   Substance and Sexual Activity    Alcohol use: No    Drug use: No    Sexual activity: Not Currently       Current Outpatient Medications on File Prior to Visit   Medication Sig    ALPRAZolam (XANAX) 0 25 mg tablet TAKE 1 TABLET BY MOUTH AT BEDTIME AS NEEDED FOR ANXIETY    famotidine (PEPCID) 20 mg tablet Take 1 tablet (20 mg total) by mouth 2 (two) times a day    ibuprofen (MOTRIN) 600 mg tablet TAKE ONE TABLET BY MOUTH EVERY 6 HOURS AS NEEDED FOR MILD PAIN    rizatriptan (MAXALT-MLT) 10 MG disintegrating tablet DISSOLVE ONE TABLET BY MOUTH ONCE AS NEEDED FOR MIGRAINE FOR UP TO ONE DOSE MAY REPEAT IN IN TWO HOURS IF NEEDED    Multiple Vitamin (MULTIVITAMIN) capsule Take 1 capsule by mouth daily (Patient not taking: Reported on 7/12/2021)     No current facility-administered medications on file prior to visit  Allergies   Allergen Reactions    Latex Rash    Sulfamethoxazole-Trimethoprim Rash       Physical Exam:    /75   Pulse 77   Wt 63 kg (139 lb)   LMP 09/10/2018   BMI 22 69 kg/m²     Constitutional:normal, well developed, well nourished, alert, in no distress and non-toxic and no overt pain behavior    Eyes:anicteric  HEENT:grossly intact  Neck:supple, symmetric, trachea midline and no masses   Pulmonary:even and unlabored  Cardiovascular:No edema or pitting edema present  Skin:Normal without rashes or lesions and well hydrated  Psychiatric:Mood and affect appropriate  Neurologic:Cranial Nerves II-XII grossly intact  Musculoskeletal:normal     Cervical Spine Exam  Appearance:  Normal lordosis  Palpation/Tenderness:  right cervical paraspinal tenderness  right trapezium tenderness  trigger points palpable  Range of Motion:  Flexion: Minimally limited  with pain  Extension:  Minimally limited  with pain  Rotation - Left:  Minimally limited  with pain  Rotation - Right:  Minimally limited  with pain  Motor Strength:  Left Arm Flexion  5/5  Left Arm Extension  5/5  Right Arm Flexion  5/5  Right Arm Extension  5/5  Left Wrist Flexion  5/5  Left Wrist Extension  5/5  Left Finger Abduction  5/5  Right Finger Abduction  5/5  Left Pincer Grasp  5/5  Right Pincer Grasp  5/5    Imaging    MRI CERVICAL SPINE WITH AND WITHOUT CONTRAST (8/6/2019)     INDICATION: G37 9: Demyelinating disease of central nervous system, unspecified      COMPARISON:  None      TECHNIQUE:  Sagittal T1, sagittal T2, sagittal inversion recovery, axial 2D merge and axial T2  Sagittal T1 and axial T1 postcontrast     IV Contrast:  6 mL of gadobutrol injection (MULTI-DOSE)      IMAGE QUALITY:  Diagnostic      FINDINGS:     ALIGNMENT:  Straightening of normal cervical lordosis  Slight degenerative anterolisthesis of C5 on C6      MARROW SIGNAL:  Normal marrow signal is identified within the visualized bony structures  No discrete marrow lesion      CERVICAL AND VISUALIZED UPPER THORACIC CORD:  Normal signal within the visualized cord    No convincing evidence for demyelinating disease      PREVERTEBRAL AND PARASPINAL SOFT TISSUES:  Normal      VISUALIZED POSTERIOR FOSSA:  The visualized posterior fossa demonstrates no abnormal signal      CERVICAL DISC SPACES:     C2-C3:  Normal      C3-C4:  Normal        C4-C5: Normal     C5-C6:  Circumferential bulging of the disc, marginal osteophytes, AP dimension of the canal is reduced to estimated 8-9 mm      C6-C7:  Minor bulge      C7-T1:  Normal      UPPER THORACIC DISC SPACES:  Normal      POSTCONTRAST IMAGING:  Normal

## 2021-07-20 ENCOUNTER — TELEPHONE (OUTPATIENT)
Dept: RADIOLOGY | Facility: CLINIC | Age: 51
End: 2021-07-20

## 2021-07-20 NOTE — TELEPHONE ENCOUNTER
----- Message from Lashay Randall RN sent at 7/19/2021 10:32 AM EDT -----  Regarding: FW: Visit Follow-Up Question  Contact: [phone]    ----- Message -----  From: Nika Brooks  Sent: 7/19/2021  10:15 AM EDT  To: [facility]  Subject: Visit Follow-Up Question                         Good Morning! Would you be able to give me a letter for my employer regarding my hours of working  I tried to speak with the Director of my dept explaining the pain in my body/anxiety/stress and no give  The statement I got is I have vacation, sick time however due to one person being out and another retiring that I could not have my time  This makes no sense since this is my time and not responsible for other people who are out or leaving the company which they need to hire more people which the dept has been ongoing understaffed  Not sure if you could state to work less hours when needed due to my ongoing pain or anything else can be stated so they stop giving me a hard time

## 2021-07-20 NOTE — TELEPHONE ENCOUNTER
Roni Allen,    I could give you a letter for temporary work hour restrictions, but not permanent because that would be a disability situation      Dr Ana Monahan

## 2021-07-27 ENCOUNTER — TELEPHONE (OUTPATIENT)
Dept: RADIOLOGY | Facility: CLINIC | Age: 51
End: 2021-07-27

## 2021-07-27 NOTE — LETTER
July 27, 2021     Patient: Bev Holt   YOB: 1970   Date of Visit: 7/27/2021       To Whom it May Concern:    Romeo Saavedra is under my professional care  She was seen in my office on 7/14/2021  Please allow her to work reduced hours when needed due to her chronic neck and back condition  If you have any questions or concerns, please don't hesitate to call           Sincerely,          Lenin Ribera MD        CC: No Recipients

## 2021-07-27 NOTE — TELEPHONE ENCOUNTER
----- Message from Barrington Astudillo RN sent at 7/27/2021 10:27 AM EDT -----  Regarding: FW: Visit Follow-Up Question  Contact: [phone]    ----- Message -----  From: Joselyn Clarke  Sent: 7/27/2021  10:22 AM EDT  To: [facility]  Subject: RE: Visit Follow-Up Question                     Dr Idalia Obrien,  Good Morning! Sorry I missed this message  Would you be able to say reduced hours when needed for now?     Thank you,  Shira Stout

## 2021-08-02 ENCOUNTER — HOSPITAL ENCOUNTER (OUTPATIENT)
Dept: CT IMAGING | Facility: HOSPITAL | Age: 51
Discharge: HOME | End: 2021-08-02
Payer: COMMERCIAL

## 2021-08-02 DIAGNOSIS — E21.3 HYPERPARATHYROIDISM (HCC): ICD-10-CM

## 2021-08-02 DIAGNOSIS — G43.019 MIGRAINE WITHOUT AURA, INTRACTABLE: ICD-10-CM

## 2021-08-02 PROCEDURE — 70492 CT SFT TSUE NCK W/O & W/DYE: CPT

## 2021-08-02 PROCEDURE — G1004 CDSM NDSC: HCPCS

## 2021-08-02 RX ORDER — RIZATRIPTAN BENZOATE 10 MG/1
TABLET, ORALLY DISINTEGRATING ORAL
Qty: 9 TABLET | Refills: 0 | Status: SHIPPED | OUTPATIENT
Start: 2021-08-02 | End: 2021-10-01

## 2021-08-02 RX ADMIN — IOHEXOL 85 ML: 350 INJECTION, SOLUTION INTRAVENOUS at 21:15

## 2021-08-04 ENCOUNTER — TELEPHONE (OUTPATIENT)
Dept: FAMILY MEDICINE CLINIC | Facility: CLINIC | Age: 51
End: 2021-08-04

## 2021-08-04 NOTE — TELEPHONE ENCOUNTER
Patient called and stated that she did get her CT results, but she is concerned and wanted to know if you can give her a call   Please advise

## 2021-08-04 NOTE — TELEPHONE ENCOUNTER
There is nothing to be concerned at this time  She has a growth in parathyroid gland which is causing her elevation of calcium level and can be a reason for her some joint/bone pain  Lets see what dr Peter Doe say about this

## 2021-08-05 ENCOUNTER — TELEPHONE (OUTPATIENT)
Dept: HEMATOLOGY ONCOLOGY | Facility: CLINIC | Age: 51
End: 2021-08-05

## 2021-08-05 NOTE — TELEPHONE ENCOUNTER
New Patient Request   Patient Details:     Jennifer Senters      1970      [id number]      Reason for Appointment   Who is calling to schedule? Patient   If not Patient, what is their name? Nani Guzman   What is the diagnosis? E21 3 (ICD-10-CM) - Hyperparathyroidism ([address] )   D35 1 (ICD-10-CM) - Parathyroid adenoma      Who is the referring doctor? Elvira Covington MD   Scheduling Information   Which department are you scheduling with ? Surg Onc   Preferred [address] Number to call back on? If calling from the [facility], use the Nurse number   [phone]   Miscellaneous Information: Referral in Epic     Please advise the patient, a new patient  will be calling them back within 1 business day

## 2021-08-05 NOTE — TELEPHONE ENCOUNTER
Its about 1 x 2 cm in size   Dr Nicky Shannon can explain to her more about this hence the referral was placed for him  Can we get her an appt to see him soon?  I dont see any appointment made yet    Thanks   Dr Mateo Almeida

## 2021-08-05 NOTE — TELEPHONE ENCOUNTER
Pt called and is still requesting more information regarding the growth ie is there more than one, how large? Requesting explanation for peace of mind  Please advise

## 2021-08-10 NOTE — TELEPHONE ENCOUNTER
New Patient Encounter    New Patient Intake Form   Patient Details:  Pratik Cardoza  1970  [id number]    Background Information:  [address] starts by opening a telephone encounter and gathering the following information   Who is calling to schedule? If not self, relationship to patient? Patient   Referring Provider Chit   What is the diagnosis? Hyperparathyroid, parathyroid adenoma   Is this Cancer or Non-Cancer? Non-Cancer   Is this diagnosis confirmed? Yes   When was the diagnosis? 8/2021   Is there a confirmed diagnosis from a biopsy/tissue reviewed by pathology? No   Were outside slides requested? NA   Is patient aware of diagnosis? Yes   Is there a personal history and what kind? No   Is there a family history and what kind? colon   Reason for visit? New Diagnosis   Have you had any imaging or labs done? If so: when, where? yes  [facility]   Are records in [facility]? yes   If patient has a prior history of cancer were old records obtained? NA   Was the patient told to bring a disk? No   Does the patient smoke or Vape? No   If yes, how many packs or cartridges per day? Scheduling Information:   Preferred [place]: [facility]     Are there any dates/time the patient cannot be seen? no   Miscellaneous:    After completing the above information, please route to Financial Counselor and the appropriate Nurse Navigator for review

## 2021-08-29 DIAGNOSIS — G89.29 CHRONIC BILATERAL LOW BACK PAIN WITHOUT SCIATICA: ICD-10-CM

## 2021-08-29 DIAGNOSIS — M54.12 CERVICAL RADICULOPATHY: ICD-10-CM

## 2021-08-29 DIAGNOSIS — M54.50 CHRONIC BILATERAL LOW BACK PAIN WITHOUT SCIATICA: ICD-10-CM

## 2021-08-30 RX ORDER — IBUPROFEN 600 MG/1
TABLET ORAL
Qty: 30 TABLET | Refills: 0 | Status: SHIPPED | OUTPATIENT
Start: 2021-08-30 | End: 2021-11-17

## 2021-09-08 ENCOUNTER — CONSULT (OUTPATIENT)
Dept: SURGICAL ONCOLOGY | Facility: CLINIC | Age: 51
End: 2021-09-08
Payer: COMMERCIAL

## 2021-09-08 VITALS
SYSTOLIC BLOOD PRESSURE: 104 MMHG | HEIGHT: 66 IN | TEMPERATURE: 98.6 F | DIASTOLIC BLOOD PRESSURE: 64 MMHG | RESPIRATION RATE: 16 BRPM | OXYGEN SATURATION: 98 % | WEIGHT: 133 LBS | BODY MASS INDEX: 21.38 KG/M2 | HEART RATE: 78 BPM

## 2021-09-08 DIAGNOSIS — D35.1 PARATHYROID ADENOMA: ICD-10-CM

## 2021-09-08 DIAGNOSIS — E21.3 HYPERPARATHYROIDISM (HCC): Primary | ICD-10-CM

## 2021-09-08 PROCEDURE — 99244 OFF/OP CNSLTJ NEW/EST MOD 40: CPT | Performed by: SURGERY

## 2021-09-08 PROCEDURE — 3008F BODY MASS INDEX DOCD: CPT | Performed by: SURGERY

## 2021-09-08 RX ORDER — TRAMADOL HYDROCHLORIDE 50 MG/1
50 TABLET ORAL EVERY 6 HOURS PRN
Qty: 10 TABLET | Refills: 0 | Status: SHIPPED | OUTPATIENT
Start: 2021-09-08 | End: 2021-12-28 | Stop reason: ALTCHOICE

## 2021-09-08 NOTE — PROGRESS NOTES
Surgical Oncology Consult       [address] [facility],[address]  [facility]  [address] [address]    Sarabjit Willams  1970  [id number]  [address] [facility],[address] [facility]  [facility]  [address] [address]    Chief Complaint   Patient presents with    Follow-up       Assessment/Plan:    No problem-specific Assessment & Plan notes found for this encounter  Diagnoses and all orders for this visit:    Hyperparathyroidism [facility])  -     Ambulatory referral to Surgical Oncology  -     Case request operating room: PARATHYROIDECTOMY, MINIMALLY INVASIVE, left, MONITORING INTRAOPERATIVE PTH (PARATHYROID HORMONE); Standing  -     Comprehensive metabolic panel; Future  -     CBC and differential; Future  -     EKG 12 lead; Future  -     XR chest pa & lateral; Future  -     Case request operating room: PARATHYROIDECTOMY, MINIMALLY INVASIVE, left, MONITORING INTRAOPERATIVE PTH (PARATHYROID HORMONE)    Parathyroid adenoma  -     Ambulatory referral to Surgical Oncology    Other orders  -     Incentive spirometry; Standing  -     Insert and maintain IV line; Standing  -     Void On-Call to O R ; Standing  -     Place sequential compression device; Standing  -     PTH, intact; Standing        Advance Care Planning/Advance Directives:  Discussed disease status, cancer treatment plans and/or cancer treatment goals with the patient  Oncology History    No history exists  History of Present Illness:   Patient is a 50-year-old woman who over last 6 months was noted have elevated calcium levels  This led to workup including a check of her PTH levels  These were also found to be elevated  She underwent CT scan to look for potential target, suspected adenoma  She is now here to discuss treatment options given her results  She complains of fatigue, achiness  No history of kidney stones    No abdominal pain or GI complaints  She has muscle back pain, though she attributes this to her job  She states that her job is fairly stressful  Review of Systems   Constitutional: Positive for fatigue  HENT: Negative  Eyes: Negative  Respiratory: Negative  Cardiovascular: Negative  Gastrointestinal: Negative  Endocrine: Negative  Genitourinary: Negative  Musculoskeletal: Positive for arthralgias, back pain and myalgias  Skin: Negative  Allergic/Immunologic: Negative  Neurological: Negative  Hematological: Negative  Psychiatric/Behavioral: The patient is nervous/anxious  Patient Active Problem List   Diagnosis    Migraine without aura, intractable    Insomnia    Anemia    CNS demyelination (HCC)    Allodynia    Orthostatic hypotension    Episodic cluster headache, not intractable    Cervical disc disorder with radiculopathy of mid-cervical region    Hyperparathyroidism ([address] )     Past Medical History:   Diagnosis Date    Abnormal Pap smear of cervix     Anemia     Anxiety     Complete uterovaginal prolapse     Constipation     HPV (human papilloma virus) infection     Migraines     PONV (postoperative nausea and vomiting)     Seasonal allergies     Tinnitus     Varicella     Wears contact lenses      Past Surgical History:   Procedure Laterality Date    CATARACT EXTRACTION Bilateral      SECTION      COLONOSCOPY      CYSTOSCOPY N/A 2018    Procedure: CYSTOSCOPY;  Surgeon: Van Arias MD;  [facility];  Service: Gynecology    EYE SURGERY      HYSTERECTOMY  2018    TN Royann Italian LIG Right 2018    Procedure: FIXATION LIGAMENT SACROSPINOUS, RIGHT;  Surgeon: Van Arias MD;  [facility];  Service: Gynecology    TN VAGINAL HYSTERECTOMY,UTERUS 250 GMS/< N/A 2018    Procedure: TOTAL VAG  HYSTERECTOMY;  Surgeon: Van Arias MD;  [facility];  Service: Gynecology    WISDOM TOOTH EXTRACTION Family History   Problem Relation Age of Onset    Kidney failure Father         acute per allscripts    Diabetes type II Father     No Known Problems Maternal Grandmother     No Known Problems Maternal Grandfather     No Known Problems Paternal Grandmother     Colon cancer Paternal Grandfather 58    No Known Problems Brother     No Known Problems Son     No Known Problems Son     No Known Problems Son     No Known Problems Paternal Aunt      Social History     Socioeconomic History    Marital status: Single     Spouse name: Not on file    Number of children: 3    Years of education: Not on file    Highest education level: Not on file   Occupational History    Not on file   Tobacco Use    Smoking status: Never Smoker    Smokeless tobacco: Never Used   Substance and Sexual Activity    Alcohol use: No    Drug use: No    Sexual activity: Not Currently   Other Topics Concern    Not on file   Social History Narrative    Caffeine use    Uses safety equipment seatbelts     Social Determinants of Health     Financial Resource Strain:     Difficulty of Paying Living Expenses:    Food Insecurity:     Worried About Running Out of Food in the Last Year:     Ran Out of Food in the Last Year:    Transportation Needs:     Lack of Transportation (Medical):      Lack of Transportation (Non-Medical):    Physical Activity:     Days of Exercise per Week:     Minutes of Exercise per Session:    Stress:     Feeling of Stress :    Social Connections:     Frequency of Communication with Friends and Family:     Frequency of Social Gatherings with Friends and Family:     Attends Voodoo Services:     Active Member of Clubs or Organizations:     Attends Club or Organization Meetings:     Marital Status:    Intimate Partner Violence:     Fear of Current or Ex-Partner:     Emotionally Abused:     Physically Abused:     Sexually Abused:        Current Outpatient Medications:     famotidine (PEPCID) 20 mg tablet, Take 1 tablet (20 mg total) by mouth 2 (two) times a day, Disp: 60 tablet, Rfl: 0    ibuprofen (MOTRIN) 600 mg tablet, TAKE ONE TABLET BY MOUTH EVERY 6 HOURS AS NEEDED FOR MILD PAIN, Disp: 30 tablet, Rfl: 0    Multiple Vitamin (MULTIVITAMIN) capsule, Take 1 capsule by mouth daily , Disp: , Rfl:     rizatriptan (MAXALT-MLT) 10 MG disintegrating tablet, DISSOLVE ONE TABLET BY MOUTH ONCE AS NEEDED FOR MIGRAINE FOR UP TO ONE DOSE MAY REPEAT IN IN TWO HOURS IF NEEDED, Disp: 9 tablet, Rfl: 0    ALPRAZolam (XANAX) 0 25 mg tablet, TAKE 1 TABLET BY MOUTH AT BEDTIME AS NEEDED FOR ANXIETY (Patient not taking: Reported on 9/8/2021), Disp: 30 tablet, Rfl: 0    methylPREDNISolone 4 MG tablet therapy pack, Use as directed on package (Patient not taking: Reported on 9/8/2021), Disp: 21 tablet, Rfl: 0  Allergies   Allergen Reactions    Latex Rash    Sulfamethoxazole-Trimethoprim Rash     Vitals:    09/08/21 1119   BP: 104/64   Pulse: 78   Resp: 16   Temp: 98 6 °F (37 °C)   SpO2: 98%       Physical Exam  Constitutional:       Appearance: Normal appearance  She is normal weight  HENT:      Head: Normocephalic and atraumatic  Right Ear: External ear normal       Left Ear: External ear normal       Nose: Nose normal       Mouth/Throat:      Mouth: Mucous membranes are dry  Cardiovascular:      Rate and Rhythm: Normal rate and regular rhythm  Pulses: Normal pulses  Heart sounds: Normal heart sounds  Pulmonary:      Effort: Pulmonary effort is normal       Breath sounds: Normal breath sounds  Abdominal:      General: Abdomen is flat  Palpations: Abdomen is soft  Genitourinary:     General: Normal vulva  Rectum: Normal    Musculoskeletal:         General: Normal range of motion  Cervical back: Normal range of motion and neck supple  No rigidity or tenderness  Lymphadenopathy:      Cervical: No cervical adenopathy  Skin:     General: Skin is warm and dry     Neurological: General: No focal deficit present  Mental Status: She is alert and oriented to person, place, and time  Psychiatric:         Mood and Affect: Mood normal          Behavior: Behavior normal          Thought Content: Thought content normal          Judgment: Judgment normal          Pathology:  none    Labs:  CBC, Coags, BMP, Mg, Phos  Lab Results   Component Value Date     5 (H) 07/09/2021    CALCIUM 10 6 (H) 07/09/2021          Imaging    CT  NECK  WITHOUT AND WITH CONTRAST FROM ROSE TO SKULL BASE     INDICATION: Hyperparathyroidism  No previous relevant imaging in this network      COMPARISON:  None      TECHNIQUE:This examination, like all CT scans performed in the [facility], was performed utilizing techniques to minimize radiation dose exposure, including the use of iterative reconstruction and automated exposure control      Both noncontrast, contrast, and post contrast delayed images were performed according to standard parathyroid protocol (4D technique) Coronal and sagittal reconstructions were performed  3D reconstructions were performed on an independent workstation,   and are supplied for review      85 mL of iohexol (OMNIPAQUE) was injected intravenously without immediate consequence      Radiation dose: 1459 mGy-cm      FINDINGS:     SERIAL NONCONTRAST, POST CONTRAST AND DELAYS: There is a 1 59 x 0 89 x 2 63 cm lesion identified in the left neck sitting posterior and inferior to the left thyroid lobe  There is a clearly defined plaque fat plane with blood vessels present between the   lesion and the thyroid  The lesion has some areas of mildly heterogeneous enhancement which are often seen in larger parathyroid adenomas  Overall, it meets CT enhancement criteria suspicious for parathyroid adenoma  Lesion is best seen on series 4   images 132 through 168, series 601 images 64 through 70, and series 602 images 58 through 60   No other suspicious lesions are identified      VASCULAR STRUCTURES:  Standard branching anatomy of the great vessels is noted without origin stenosis  There is no carotid stenosis by Nascet criteria      VISUALIZED PARANASAL SINUSES:  Normal      NASAL CAVITY AND NASOPHARYNX:  Normal      SUPRAHYOID NECK:  Normal oropharynx, oral cavity, parapharyngeal and retropharyngeal spaces      INFRAHYOID NECK:  Normal oropharynx, oral cavity, parapharyngeal and retropharyngeal spaces      THYROID GLAND:  There is some asymmetry with the right lobe larger than the left      LYMPH NODES:  No pathologic or enlarged adenopathy      MEDIASTINUM:  Unremarkable      BONY STRUCTURES  Mild osteopenia is present with degenerative changes in the cervical spine, most notable at the C4-5 intervertebral disc space      LUNG APICES:  Unremarkable       IMPRESSION:     1  1 59 x 0 89 x 2 63 cm lesion in the left neck as described above which meets the CT enhancement criteria suspicious for parathyroid adenoma  Please see above for further details  2  Mild osteopenia with degenerative changes noted in the cervical spine                     No results found  I reviewed the above laboratory and imaging data  Discussion/Summary:  Patient is a 48-year-old woman with suspected primary hyperparathyroidism  She has what appears to be a large left-sided target on scan  She is amenable to left parathyroidectomy, possible 4 gland exploration, with intraoperative PTH monitoring  Rationale for this discussed with her  She understands the plan wishes to proceed as outlined  All questions answered and consents signed for surgery

## 2021-09-20 ENCOUNTER — TELEPHONE (OUTPATIENT)
Dept: SURGICAL ONCOLOGY | Facility: CLINIC | Age: 51
End: 2021-09-20

## 2021-10-01 ENCOUNTER — OFFICE VISIT (OUTPATIENT)
Dept: LAB | Facility: CLINIC | Age: 51
End: 2021-10-01
Payer: COMMERCIAL

## 2021-10-01 ENCOUNTER — HOSPITAL ENCOUNTER (OUTPATIENT)
Dept: RADIOLOGY | Facility: HOSPITAL | Age: 51
Discharge: HOME | End: 2021-10-01
Attending: SURGERY
Payer: COMMERCIAL

## 2021-10-01 ENCOUNTER — APPOINTMENT (OUTPATIENT)
Dept: LAB | Facility: CLINIC | Age: 51
End: 2021-10-01
Payer: COMMERCIAL

## 2021-10-01 DIAGNOSIS — E21.3 HYPERPARATHYROIDISM (HCC): ICD-10-CM

## 2021-10-01 DIAGNOSIS — G43.019 MIGRAINE WITHOUT AURA, INTRACTABLE: ICD-10-CM

## 2021-10-01 LAB
ALBUMIN SERPL BCP-MCNC: 4.2 G/DL (ref 3.5–5)
ALP SERPL-CCNC: 100 U/L (ref 46–116)
ALT SERPL W P-5'-P-CCNC: 22 U/L (ref 12–78)
ANION GAP SERPL CALCULATED.3IONS-SCNC: 5 MMOL/L (ref 4–13)
AST SERPL W P-5'-P-CCNC: 12 U/L (ref 5–45)
ATRIAL RATE: 65 BPM
BASOPHILS # BLD AUTO: 0.02 THOUSANDS/ΜL (ref 0–0.1)
BASOPHILS NFR BLD AUTO: 0 % (ref 0–1)
BILIRUB SERPL-MCNC: 0.48 MG/DL (ref 0.2–1)
BUN SERPL-MCNC: 16 MG/DL (ref 5–25)
CALCIUM SERPL-MCNC: 10.7 MG/DL (ref 8.3–10.1)
CHLORIDE SERPL-SCNC: 105 MMOL/L (ref 100–108)
CO2 SERPL-SCNC: 29 MMOL/L (ref 21–32)
CREAT SERPL-MCNC: 0.63 MG/DL (ref 0.6–1.3)
EOSINOPHIL # BLD AUTO: 0.07 THOUSAND/ΜL (ref 0–0.61)
EOSINOPHIL NFR BLD AUTO: 2 % (ref 0–6)
ERYTHROCYTE [DISTWIDTH] IN BLOOD BY AUTOMATED COUNT: 12.7 % (ref 11.6–15.1)
GFR SERPL CREATININE-BSD FRML MDRD: 103 ML/MIN/1.73SQ M
GLUCOSE SERPL-MCNC: 88 MG/DL (ref 65–140)
HCT VFR BLD AUTO: 43.7 % (ref 34.8–46.1)
HGB BLD-MCNC: 13.8 G/DL (ref 11.5–15.4)
IMM GRANULOCYTES # BLD AUTO: 0.01 THOUSAND/UL (ref 0–0.2)
IMM GRANULOCYTES NFR BLD AUTO: 0 % (ref 0–2)
LYMPHOCYTES # BLD AUTO: 2.04 THOUSANDS/ΜL (ref 0.6–4.47)
LYMPHOCYTES NFR BLD AUTO: 43 % (ref 14–44)
MCH RBC QN AUTO: 29.2 PG (ref 26.8–34.3)
MCHC RBC AUTO-ENTMCNC: 31.6 G/DL (ref 31.4–37.4)
MCV RBC AUTO: 92 FL (ref 82–98)
MONOCYTES # BLD AUTO: 0.25 THOUSAND/ΜL (ref 0.17–1.22)
MONOCYTES NFR BLD AUTO: 5 % (ref 4–12)
NEUTROPHILS # BLD AUTO: 2.36 THOUSANDS/ΜL (ref 1.85–7.62)
NEUTS SEG NFR BLD AUTO: 50 % (ref 43–75)
NRBC BLD AUTO-RTO: 0 /100 WBCS
P AXIS: 66 DEGREES
PLATELET # BLD AUTO: 192 THOUSANDS/UL (ref 149–390)
PMV BLD AUTO: 10.4 FL (ref 8.9–12.7)
POTASSIUM SERPL-SCNC: 4.1 MMOL/L (ref 3.5–5.3)
PR INTERVAL: 154 MS
PROT SERPL-MCNC: 7.2 G/DL (ref 6.4–8.2)
QRS AXIS: 83 DEGREES
QRSD INTERVAL: 104 MS
QT INTERVAL: 384 MS
QTC INTERVAL: 399 MS
RBC # BLD AUTO: 4.73 MILLION/UL (ref 3.81–5.12)
SODIUM SERPL-SCNC: 139 MMOL/L (ref 136–145)
T WAVE AXIS: 66 DEGREES
VENTRICULAR RATE: 65 BPM
WBC # BLD AUTO: 4.75 THOUSAND/UL (ref 4.31–10.16)

## 2021-10-01 PROCEDURE — 85025 COMPLETE CBC W/AUTO DIFF WBC: CPT

## 2021-10-01 PROCEDURE — 71046 X-RAY EXAM CHEST 2 VIEWS: CPT

## 2021-10-01 PROCEDURE — 93005 ELECTROCARDIOGRAM TRACING: CPT

## 2021-10-01 PROCEDURE — 36415 COLL VENOUS BLD VENIPUNCTURE: CPT

## 2021-10-01 PROCEDURE — 80053 COMPREHEN METABOLIC PANEL: CPT

## 2021-10-01 PROCEDURE — 93010 ELECTROCARDIOGRAM REPORT: CPT | Performed by: INTERNAL MEDICINE

## 2021-10-01 RX ORDER — RIZATRIPTAN BENZOATE 10 MG/1
TABLET, ORALLY DISINTEGRATING ORAL
Qty: 9 TABLET | Refills: 0 | Status: SHIPPED | OUTPATIENT
Start: 2021-10-01 | End: 2021-11-01

## 2021-10-08 ENCOUNTER — TELEPHONE (OUTPATIENT)
Dept: HEMATOLOGY ONCOLOGY | Facility: CLINIC | Age: 51
End: 2021-10-08

## 2021-10-11 ENCOUNTER — TELEPHONE (OUTPATIENT)
Dept: SURGICAL ONCOLOGY | Facility: CLINIC | Age: 51
End: 2021-10-11

## 2021-10-13 ENCOUNTER — ANESTHESIA EVENT (OUTPATIENT)
Dept: PERIOP | Facility: HOSPITAL | Age: 51
End: 2021-10-13
Payer: COMMERCIAL

## 2021-10-14 ENCOUNTER — ANESTHESIA (OUTPATIENT)
Dept: PERIOP | Facility: HOSPITAL | Age: 51
End: 2021-10-14
Payer: COMMERCIAL

## 2021-10-14 ENCOUNTER — HOSPITAL ENCOUNTER (OUTPATIENT)
Facility: HOSPITAL | Age: 51
Setting detail: OUTPATIENT SURGERY
Discharge: HOME | End: 2021-10-14
Attending: SURGERY | Admitting: SURGERY
Payer: COMMERCIAL

## 2021-10-14 VITALS
HEART RATE: 88 BPM | DIASTOLIC BLOOD PRESSURE: 55 MMHG | BODY MASS INDEX: 21.66 KG/M2 | HEIGHT: 65 IN | SYSTOLIC BLOOD PRESSURE: 120 MMHG | RESPIRATION RATE: 16 BRPM | TEMPERATURE: 97.8 F | WEIGHT: 130 LBS | OXYGEN SATURATION: 100 %

## 2021-10-14 DIAGNOSIS — E21.3 HYPERPARATHYROIDISM (HCC): ICD-10-CM

## 2021-10-14 LAB
CALCIUM SERPL-MCNC: 10.3 MG/DL (ref 8.3–10.1)
CALCIUM SERPL-MCNC: 9.6 MG/DL (ref 8.3–10.1)
CALCIUM SERPL-MCNC: 9.7 MG/DL (ref 8.3–10.1)
PTH-INTACT SERPL-MCNC: 183.9 PG/ML (ref 18.4–80.1)
PTH-INTACT SERPL-MCNC: 359.8 PG/ML (ref 18.4–80.1)
PTH-INTACT SERPL-MCNC: 60.1 PG/ML (ref 18.4–80.1)
PTH-INTACT SERPL-MCNC: 639.2 PG/ML (ref 18.4–80.1)
PTH-INTACT SERPL-MCNC: 88.2 PG/ML (ref 18.4–80.1)
PTH-INTACT SERPL-MCNC: 898.1 PG/ML (ref 18.4–80.1)

## 2021-10-14 PROCEDURE — 88341 IMHCHEM/IMCYTCHM EA ADD ANTB: CPT | Performed by: SPECIALIST

## 2021-10-14 PROCEDURE — 88331 PATH CONSLTJ SURG 1 BLK 1SPC: CPT | Performed by: SPECIALIST

## 2021-10-14 PROCEDURE — 82310 ASSAY OF CALCIUM: CPT | Performed by: PHYSICIAN ASSISTANT

## 2021-10-14 PROCEDURE — 60500 EXPLORE PARATHYROID GLANDS: CPT | Performed by: SURGERY

## 2021-10-14 PROCEDURE — 88305 TISSUE EXAM BY PATHOLOGIST: CPT | Performed by: SPECIALIST

## 2021-10-14 PROCEDURE — 88342 IMHCHEM/IMCYTCHM 1ST ANTB: CPT | Performed by: SPECIALIST

## 2021-10-14 PROCEDURE — NC001 PR NO CHARGE: Performed by: PHYSICIAN ASSISTANT

## 2021-10-14 PROCEDURE — 82310 ASSAY OF CALCIUM: CPT | Performed by: SURGERY

## 2021-10-14 PROCEDURE — 99024 POSTOP FOLLOW-UP VISIT: CPT | Performed by: SURGERY

## 2021-10-14 PROCEDURE — 83970 ASSAY OF PARATHORMONE: CPT | Performed by: SURGERY

## 2021-10-14 RX ORDER — DEXAMETHASONE SODIUM PHOSPHATE 10 MG/ML
INJECTION, SOLUTION INTRAMUSCULAR; INTRAVENOUS AS NEEDED
Status: DISCONTINUED | OUTPATIENT
Start: 2021-10-14 | End: 2021-10-14

## 2021-10-14 RX ORDER — ONDANSETRON 2 MG/ML
4 INJECTION INTRAMUSCULAR; INTRAVENOUS ONCE AS NEEDED
Status: DISCONTINUED | OUTPATIENT
Start: 2021-10-14 | End: 2021-10-14 | Stop reason: HOSPADM

## 2021-10-14 RX ORDER — MIDAZOLAM HYDROCHLORIDE 2 MG/2ML
INJECTION, SOLUTION INTRAMUSCULAR; INTRAVENOUS AS NEEDED
Status: DISCONTINUED | OUTPATIENT
Start: 2021-10-14 | End: 2021-10-14

## 2021-10-14 RX ORDER — ALBUTEROL SULFATE 2.5 MG/3ML
2.5 SOLUTION RESPIRATORY (INHALATION) ONCE AS NEEDED
Status: DISCONTINUED | OUTPATIENT
Start: 2021-10-14 | End: 2021-10-14 | Stop reason: HOSPADM

## 2021-10-14 RX ORDER — GLYCOPYRROLATE 0.2 MG/ML
INJECTION INTRAMUSCULAR; INTRAVENOUS AS NEEDED
Status: DISCONTINUED | OUTPATIENT
Start: 2021-10-14 | End: 2021-10-14

## 2021-10-14 RX ORDER — NEOSTIGMINE METHYLSULFATE 1 MG/ML
INJECTION INTRAVENOUS AS NEEDED
Status: DISCONTINUED | OUTPATIENT
Start: 2021-10-14 | End: 2021-10-14

## 2021-10-14 RX ORDER — BUPIVACAINE HYDROCHLORIDE 2.5 MG/ML
INJECTION, SOLUTION EPIDURAL; INFILTRATION; INTRACAUDAL AS NEEDED
Status: DISCONTINUED | OUTPATIENT
Start: 2021-10-14 | End: 2021-10-14 | Stop reason: HOSPADM

## 2021-10-14 RX ORDER — LIDOCAINE HYDROCHLORIDE 10 MG/ML
0.5 INJECTION, SOLUTION EPIDURAL; INFILTRATION; INTRACAUDAL; PERINEURAL ONCE AS NEEDED
Status: DISCONTINUED | OUTPATIENT
Start: 2021-10-14 | End: 2021-10-14 | Stop reason: HOSPADM

## 2021-10-14 RX ORDER — ROCURONIUM BROMIDE 10 MG/ML
INJECTION, SOLUTION INTRAVENOUS AS NEEDED
Status: DISCONTINUED | OUTPATIENT
Start: 2021-10-14 | End: 2021-10-14

## 2021-10-14 RX ORDER — PROPOFOL 10 MG/ML
INJECTION, EMULSION INTRAVENOUS AS NEEDED
Status: DISCONTINUED | OUTPATIENT
Start: 2021-10-14 | End: 2021-10-14

## 2021-10-14 RX ORDER — TRAMADOL HYDROCHLORIDE 50 MG/1
50 TABLET ORAL EVERY 6 HOURS PRN
Status: DISCONTINUED | OUTPATIENT
Start: 2021-10-14 | End: 2021-10-14 | Stop reason: HOSPADM

## 2021-10-14 RX ORDER — ONDANSETRON 2 MG/ML
INJECTION INTRAMUSCULAR; INTRAVENOUS AS NEEDED
Status: DISCONTINUED | OUTPATIENT
Start: 2021-10-14 | End: 2021-10-14

## 2021-10-14 RX ORDER — PROMETHAZINE HYDROCHLORIDE 25 MG/ML
12.5 INJECTION, SOLUTION INTRAMUSCULAR; INTRAVENOUS ONCE AS NEEDED
Status: DISCONTINUED | OUTPATIENT
Start: 2021-10-14 | End: 2021-10-14 | Stop reason: HOSPADM

## 2021-10-14 RX ORDER — SODIUM CHLORIDE, SODIUM LACTATE, POTASSIUM CHLORIDE, CALCIUM CHLORIDE 600; 310; 30; 20 MG/100ML; MG/100ML; MG/100ML; MG/100ML
125 INJECTION, SOLUTION INTRAVENOUS CONTINUOUS
Status: DISCONTINUED | OUTPATIENT
Start: 2021-10-14 | End: 2021-10-14 | Stop reason: HOSPADM

## 2021-10-14 RX ORDER — FENTANYL CITRATE 50 UG/ML
INJECTION, SOLUTION INTRAMUSCULAR; INTRAVENOUS AS NEEDED
Status: DISCONTINUED | OUTPATIENT
Start: 2021-10-14 | End: 2021-10-14

## 2021-10-14 RX ORDER — FENTANYL CITRATE/PF 50 MCG/ML
25 SYRINGE (ML) INJECTION
Status: DISCONTINUED | OUTPATIENT
Start: 2021-10-14 | End: 2021-10-14 | Stop reason: HOSPADM

## 2021-10-14 RX ORDER — LIDOCAINE HYDROCHLORIDE 10 MG/ML
INJECTION, SOLUTION EPIDURAL; INFILTRATION; INTRACAUDAL; PERINEURAL AS NEEDED
Status: DISCONTINUED | OUTPATIENT
Start: 2021-10-14 | End: 2021-10-14

## 2021-10-14 RX ORDER — PROPOFOL 10 MG/ML
INJECTION, EMULSION INTRAVENOUS CONTINUOUS PRN
Status: DISCONTINUED | OUTPATIENT
Start: 2021-10-14 | End: 2021-10-14

## 2021-10-14 RX ORDER — KETOROLAC TROMETHAMINE 30 MG/ML
INJECTION, SOLUTION INTRAMUSCULAR; INTRAVENOUS AS NEEDED
Status: DISCONTINUED | OUTPATIENT
Start: 2021-10-14 | End: 2021-10-14

## 2021-10-14 RX ORDER — HYDROMORPHONE HCL/PF 1 MG/ML
0.5 SYRINGE (ML) INJECTION
Status: DISCONTINUED | OUTPATIENT
Start: 2021-10-14 | End: 2021-10-14 | Stop reason: HOSPADM

## 2021-10-14 RX ORDER — LABETALOL 20 MG/4 ML (5 MG/ML) INTRAVENOUS SYRINGE
5
Status: DISCONTINUED | OUTPATIENT
Start: 2021-10-14 | End: 2021-10-14 | Stop reason: HOSPADM

## 2021-10-14 RX ORDER — MEPERIDINE HYDROCHLORIDE 25 MG/ML
12.5 INJECTION INTRAMUSCULAR; INTRAVENOUS; SUBCUTANEOUS ONCE
Status: DISCONTINUED | OUTPATIENT
Start: 2021-10-14 | End: 2021-10-14 | Stop reason: HOSPADM

## 2021-10-14 RX ADMIN — FENTANYL CITRATE 100 MCG: 50 INJECTION INTRAMUSCULAR; INTRAVENOUS at 07:52

## 2021-10-14 RX ADMIN — PROPOFOL 30 MCG/KG/MIN: 10 INJECTION, EMULSION INTRAVENOUS at 08:20

## 2021-10-14 RX ADMIN — MIDAZOLAM 2 MG: 1 INJECTION INTRAMUSCULAR; INTRAVENOUS at 07:47

## 2021-10-14 RX ADMIN — KETOROLAC TROMETHAMINE 30 MG: 30 INJECTION, SOLUTION INTRAMUSCULAR at 09:10

## 2021-10-14 RX ADMIN — NEOSTIGMINE METHYLSULFATE 2 MG: 1 INJECTION INTRAVENOUS at 09:46

## 2021-10-14 RX ADMIN — SODIUM CHLORIDE, SODIUM LACTATE, POTASSIUM CHLORIDE, AND CALCIUM CHLORIDE: .6; .31; .03; .02 INJECTION, SOLUTION INTRAVENOUS at 07:47

## 2021-10-14 RX ADMIN — PHENYLEPHRINE HYDROCHLORIDE 30 MCG/MIN: 10 INJECTION INTRAVENOUS at 08:08

## 2021-10-14 RX ADMIN — DEXAMETHASONE SODIUM PHOSPHATE 10 MG: 10 INJECTION, SOLUTION INTRAMUSCULAR; INTRAVENOUS at 08:08

## 2021-10-14 RX ADMIN — GLYCOPYRROLATE 0.4 MG: 0.2 INJECTION, SOLUTION INTRAMUSCULAR; INTRAVENOUS at 09:46

## 2021-10-14 RX ADMIN — PROPOFOL 160 MG: 10 INJECTION, EMULSION INTRAVENOUS at 07:52

## 2021-10-14 RX ADMIN — ONDANSETRON 4 MG: 2 INJECTION INTRAMUSCULAR; INTRAVENOUS at 08:08

## 2021-10-14 RX ADMIN — SODIUM CHLORIDE, SODIUM LACTATE, POTASSIUM CHLORIDE, AND CALCIUM CHLORIDE: .6; .31; .03; .02 INJECTION, SOLUTION INTRAVENOUS at 10:02

## 2021-10-14 RX ADMIN — ROCURONIUM BROMIDE 50 MG: 50 INJECTION, SOLUTION INTRAVENOUS at 07:53

## 2021-10-14 RX ADMIN — LIDOCAINE HYDROCHLORIDE 50 MG: 10 INJECTION, SOLUTION EPIDURAL; INFILTRATION; INTRACAUDAL; PERINEURAL at 07:52

## 2021-10-15 DIAGNOSIS — K21.9 GASTROESOPHAGEAL REFLUX DISEASE WITHOUT ESOPHAGITIS: ICD-10-CM

## 2021-10-15 RX ORDER — FAMOTIDINE 20 MG/1
TABLET, FILM COATED ORAL
Qty: 60 TABLET | Refills: 0 | Status: SHIPPED | OUTPATIENT
Start: 2021-10-15 | End: 2022-02-14 | Stop reason: ALTCHOICE

## 2021-10-26 PROBLEM — Z98.890 STATUS POST PARATHYROIDECTOMY: Status: ACTIVE | Noted: 2021-10-26

## 2021-10-26 PROBLEM — Z90.89 STATUS POST PARATHYROIDECTOMY: Status: ACTIVE | Noted: 2021-10-26

## 2021-10-26 PROBLEM — E89.2 STATUS POST PARATHYROIDECTOMY (HCC): Status: ACTIVE | Noted: 2021-10-26

## 2021-10-27 ENCOUNTER — OFFICE VISIT (OUTPATIENT)
Dept: SURGICAL ONCOLOGY | Facility: CLINIC | Age: 51
End: 2021-10-27

## 2021-10-27 ENCOUNTER — TELEPHONE (OUTPATIENT)
Dept: SURGICAL ONCOLOGY | Facility: CLINIC | Age: 51
End: 2021-10-27

## 2021-10-27 VITALS
TEMPERATURE: 98 F | WEIGHT: 133 LBS | HEART RATE: 88 BPM | DIASTOLIC BLOOD PRESSURE: 70 MMHG | RESPIRATION RATE: 16 BRPM | OXYGEN SATURATION: 98 % | SYSTOLIC BLOOD PRESSURE: 114 MMHG | HEIGHT: 65 IN | BODY MASS INDEX: 22.16 KG/M2

## 2021-10-27 DIAGNOSIS — E89.2 STATUS POST PARATHYROIDECTOMY (HCC): Primary | ICD-10-CM

## 2021-10-27 PROCEDURE — 3008F BODY MASS INDEX DOCD: CPT | Performed by: SURGERY

## 2021-10-27 PROCEDURE — 99024 POSTOP FOLLOW-UP VISIT: CPT | Performed by: SURGERY

## 2021-11-01 DIAGNOSIS — G43.019 MIGRAINE WITHOUT AURA, INTRACTABLE: ICD-10-CM

## 2021-11-01 RX ORDER — RIZATRIPTAN BENZOATE 10 MG/1
TABLET, ORALLY DISINTEGRATING ORAL
Qty: 9 TABLET | Refills: 0 | Status: SHIPPED | OUTPATIENT
Start: 2021-11-01 | End: 2021-12-24

## 2021-11-17 ENCOUNTER — TELEPHONE (OUTPATIENT)
Dept: PAIN MEDICINE | Facility: CLINIC | Age: 51
End: 2021-11-17

## 2021-11-17 DIAGNOSIS — M96.1 POSTLAMINECTOMY SYNDROME OF LUMBAR REGION: Primary | ICD-10-CM

## 2021-11-17 DIAGNOSIS — G89.29 CHRONIC BILATERAL LOW BACK PAIN WITHOUT SCIATICA: ICD-10-CM

## 2021-11-17 DIAGNOSIS — M51.16 INTERVERTEBRAL DISC DISORDER WITH RADICULOPATHY OF LUMBAR REGION: ICD-10-CM

## 2021-11-17 DIAGNOSIS — M54.12 CERVICAL RADICULOPATHY: ICD-10-CM

## 2021-11-17 DIAGNOSIS — M54.50 CHRONIC BILATERAL LOW BACK PAIN WITHOUT SCIATICA: ICD-10-CM

## 2021-11-17 RX ORDER — METHYLPREDNISOLONE 4 MG/1
TABLET ORAL
Qty: 21 TABLET | Refills: 0 | Status: SHIPPED | OUTPATIENT
Start: 2021-11-17 | End: 2022-02-14 | Stop reason: ALTCHOICE

## 2021-11-17 RX ORDER — IBUPROFEN 600 MG/1
TABLET ORAL
Qty: 30 TABLET | Refills: 0 | Status: SHIPPED | OUTPATIENT
Start: 2021-11-17 | End: 2022-06-12

## 2021-12-06 ENCOUNTER — CONSULT (OUTPATIENT)
Dept: ENDOCRINOLOGY | Facility: CLINIC | Age: 51
End: 2021-12-06
Payer: COMMERCIAL

## 2021-12-06 VITALS
WEIGHT: 132 LBS | TEMPERATURE: 97.8 F | DIASTOLIC BLOOD PRESSURE: 82 MMHG | BODY MASS INDEX: 21.97 KG/M2 | SYSTOLIC BLOOD PRESSURE: 124 MMHG | HEART RATE: 78 BPM

## 2021-12-06 DIAGNOSIS — E21.0 PRIMARY HYPERPARATHYROIDISM (HCC): Primary | ICD-10-CM

## 2021-12-06 DIAGNOSIS — E21.3 HYPERPARATHYROIDISM (HCC): ICD-10-CM

## 2021-12-06 DIAGNOSIS — E55.9 VITAMIN D DEFICIENCY: ICD-10-CM

## 2021-12-06 PROCEDURE — 99244 OFF/OP CNSLTJ NEW/EST MOD 40: CPT | Performed by: INTERNAL MEDICINE

## 2021-12-07 ENCOUNTER — TELEPHONE (OUTPATIENT)
Dept: ENDOCRINOLOGY | Facility: CLINIC | Age: 51
End: 2021-12-07

## 2021-12-23 ENCOUNTER — LAB (OUTPATIENT)
Dept: LAB | Facility: CLINIC | Age: 51
End: 2021-12-23
Payer: COMMERCIAL

## 2021-12-23 DIAGNOSIS — E21.0 PRIMARY HYPERPARATHYROIDISM (HCC): ICD-10-CM

## 2021-12-23 DIAGNOSIS — E21.3 HYPERPARATHYROIDISM (HCC): ICD-10-CM

## 2021-12-23 DIAGNOSIS — G43.019 MIGRAINE WITHOUT AURA, INTRACTABLE: ICD-10-CM

## 2021-12-23 DIAGNOSIS — E55.9 VITAMIN D DEFICIENCY: ICD-10-CM

## 2021-12-23 LAB
25(OH)D3 SERPL-MCNC: 36 NG/ML (ref 30–100)
ANION GAP SERPL CALCULATED.3IONS-SCNC: 3 MMOL/L (ref 4–13)
BUN SERPL-MCNC: 14 MG/DL (ref 5–25)
CALCIUM SERPL-MCNC: 8.9 MG/DL (ref 8.3–10.1)
CHLORIDE SERPL-SCNC: 104 MMOL/L (ref 100–108)
CO2 SERPL-SCNC: 31 MMOL/L (ref 21–32)
CREAT SERPL-MCNC: 0.67 MG/DL (ref 0.6–1.3)
GFR SERPL CREATININE-BSD FRML MDRD: 101 ML/MIN/1.73SQ M
GLUCOSE P FAST SERPL-MCNC: 93 MG/DL (ref 65–99)
POTASSIUM SERPL-SCNC: 3.8 MMOL/L (ref 3.5–5.3)
PTH-INTACT SERPL-MCNC: 63.5 PG/ML (ref 18.4–80.1)
SODIUM SERPL-SCNC: 138 MMOL/L (ref 136–145)

## 2021-12-23 PROCEDURE — 83970 ASSAY OF PARATHORMONE: CPT

## 2021-12-23 PROCEDURE — 80048 BASIC METABOLIC PNL TOTAL CA: CPT

## 2021-12-23 PROCEDURE — 36415 COLL VENOUS BLD VENIPUNCTURE: CPT

## 2021-12-23 PROCEDURE — 82306 VITAMIN D 25 HYDROXY: CPT

## 2021-12-24 RX ORDER — RIZATRIPTAN BENZOATE 10 MG/1
TABLET, ORALLY DISINTEGRATING ORAL
Qty: 9 TABLET | Refills: 0 | Status: SHIPPED | OUTPATIENT
Start: 2021-12-24 | End: 2022-07-22

## 2021-12-28 ENCOUNTER — TELEMEDICINE (OUTPATIENT)
Dept: FAMILY MEDICINE CLINIC | Facility: CLINIC | Age: 51
End: 2021-12-28
Payer: COMMERCIAL

## 2021-12-28 VITALS — HEIGHT: 65 IN | WEIGHT: 133 LBS | BODY MASS INDEX: 22.16 KG/M2

## 2021-12-28 DIAGNOSIS — J02.9 SORE THROAT: ICD-10-CM

## 2021-12-28 DIAGNOSIS — J01.00 ACUTE NON-RECURRENT MAXILLARY SINUSITIS: Primary | ICD-10-CM

## 2021-12-28 PROCEDURE — 3725F SCREEN DEPRESSION PERFORMED: CPT | Performed by: FAMILY MEDICINE

## 2021-12-28 PROCEDURE — 99213 OFFICE O/P EST LOW 20 MIN: CPT | Performed by: FAMILY MEDICINE

## 2021-12-28 PROCEDURE — 1036F TOBACCO NON-USER: CPT | Performed by: FAMILY MEDICINE

## 2021-12-28 PROCEDURE — 3008F BODY MASS INDEX DOCD: CPT | Performed by: FAMILY MEDICINE

## 2021-12-28 RX ORDER — AZITHROMYCIN 250 MG/1
TABLET, FILM COATED ORAL
Qty: 6 TABLET | Refills: 0 | Status: SHIPPED | OUTPATIENT
Start: 2021-12-28 | End: 2022-01-01

## 2021-12-28 RX ORDER — FLUTICASONE PROPIONATE 50 MCG
2 SPRAY, SUSPENSION (ML) NASAL DAILY
Qty: 16 G | Refills: 0 | Status: SHIPPED | OUTPATIENT
Start: 2021-12-28

## 2022-01-03 DIAGNOSIS — J01.00 ACUTE NON-RECURRENT MAXILLARY SINUSITIS: Primary | ICD-10-CM

## 2022-01-03 RX ORDER — BENZONATATE 100 MG/1
CAPSULE ORAL
Qty: 20 CAPSULE | Refills: 0 | Status: SHIPPED | OUTPATIENT
Start: 2022-01-03 | End: 2022-02-14

## 2022-02-08 ENCOUNTER — RA CDI HCC (OUTPATIENT)
Dept: OTHER | Facility: HOSPITAL | Age: 52
End: 2022-02-08

## 2022-02-08 NOTE — PROGRESS NOTES
[facility]  coding opportunities       Number of diagnosis code(s) already on the problem list added to FYI fla            Patients insurance company: [employer] ([employer])           [employer]  coding opportunities          Number of diagnosis code(s) already on the problem list added to [employer] fla         Number of suggestions used: 2      Patients insurance company: [employer] ([employer])     Visit status: Patient arrived for their scheduled appointment        Appt on 22    Please review for current status and assess if applicable for 9172    2) G37 9: CNS demyelination ([facility] ) - refer to 3/12/2019 note when it was last assessed     2) E21 0: Primary hyperparathyroidism ([facility] ) - Refer to 2021 Endo note

## 2022-02-14 ENCOUNTER — OFFICE VISIT (OUTPATIENT)
Dept: FAMILY MEDICINE CLINIC | Facility: CLINIC | Age: 52
End: 2022-02-14
Payer: COMMERCIAL

## 2022-02-14 VITALS
BODY MASS INDEX: 22.92 KG/M2 | HEART RATE: 83 BPM | WEIGHT: 137.6 LBS | RESPIRATION RATE: 16 BRPM | OXYGEN SATURATION: 95 % | SYSTOLIC BLOOD PRESSURE: 114 MMHG | TEMPERATURE: 97.3 F | DIASTOLIC BLOOD PRESSURE: 72 MMHG | HEIGHT: 65 IN

## 2022-02-14 DIAGNOSIS — M50.120 CERVICAL DISC DISORDER WITH RADICULOPATHY OF MID-CERVICAL REGION: ICD-10-CM

## 2022-02-14 DIAGNOSIS — E89.2 STATUS POST PARATHYROIDECTOMY (HCC): ICD-10-CM

## 2022-02-14 DIAGNOSIS — G37.9 CNS DEMYELINATION (HCC): ICD-10-CM

## 2022-02-14 DIAGNOSIS — F51.01 PRIMARY INSOMNIA: ICD-10-CM

## 2022-02-14 DIAGNOSIS — E21.3 HYPERPARATHYROIDISM (HCC): Primary | ICD-10-CM

## 2022-02-14 DIAGNOSIS — G44.019 EPISODIC CLUSTER HEADACHE, NOT INTRACTABLE: ICD-10-CM

## 2022-02-14 PROBLEM — D64.9 ANEMIA: Status: RESOLVED | Noted: 2018-11-06 | Resolved: 2022-02-14

## 2022-02-14 PROBLEM — R20.8 ALLODYNIA: Status: RESOLVED | Noted: 2019-03-12 | Resolved: 2022-02-14

## 2022-02-14 PROBLEM — I95.1 ORTHOSTATIC HYPOTENSION: Status: RESOLVED | Noted: 2019-03-12 | Resolved: 2022-02-14

## 2022-02-14 PROBLEM — G47.00 INSOMNIA: Status: RESOLVED | Noted: 2018-06-25 | Resolved: 2022-02-14

## 2022-02-14 PROCEDURE — 1036F TOBACCO NON-USER: CPT | Performed by: FAMILY MEDICINE

## 2022-02-14 PROCEDURE — 3725F SCREEN DEPRESSION PERFORMED: CPT | Performed by: FAMILY MEDICINE

## 2022-02-14 PROCEDURE — 3008F BODY MASS INDEX DOCD: CPT | Performed by: FAMILY MEDICINE

## 2022-02-14 PROCEDURE — 99214 OFFICE O/P EST MOD 30 MIN: CPT | Performed by: FAMILY MEDICINE

## 2022-02-14 NOTE — PROGRESS NOTES
Assessment/Plan:    Hyperparathyroidism ([facility] )  Had surgery 10/2021  Recent PTH level was normal   Seeing surgical oncology 4/27/2022    Episodic cluster headache, not intractable  Improving after parathyroidectomy   Continue magnesium and B2     Primary insomnia  Sleeping better than before   Doing more exercise to help her sleep     Cervical disc disorder with radiculopathy of mid-cervical region  Stable on Advil PRN   Injection didn't help     CNS demyelination (HCC)  Stable  Migraines are better       Diagnoses and all orders for this visit:    Hyperparathyroidism ([facility] )    [facility]    Episodic cluster headache, not intractable    Cervical disc disorder with radiculopathy of mid-cervical region    C[facility] demyelination ([facility] )    Primary insomnia          Subjective:      Patient ID: Harvey Alpers is a 47 y o  female  HPI  Here for follow up  Feeling well overall   Less fatigue and tired         The following portions of the patient's history were reviewed and updated as appropriate: allergies, current medications, past family history, past medical history, past social history, past surgical history and problem list     Review of Systems   Constitutional: Negative  HENT: Negative  Respiratory: Negative  Cardiovascular: Negative  Endocrine: Negative  Genitourinary: Negative  Musculoskeletal: Negative  Allergic/Immunologic: Negative  Neurological: Negative  Hematological: Negative  Psychiatric/Behavioral: Negative  Objective:      /72 (BP Location: Left arm, Patient Position: Sitting, Cuff Size: Adult)   Pulse 83   Temp (!) 97 3 °F (36 3 °C) (Skin)   Resp 16   Ht 5' 5" (1 651 m)   Wt 62 4 kg (137 lb 9 6 oz)   LMP 09/10/2018   SpO2 95%   BMI 22 90 kg/m²          Physical Exam  Vitals reviewed  Constitutional:       Appearance: Normal appearance     HENT:      Mouth/Throat:      Mouth: Mucous membranes are moist    Cardiovascular: Rate and Rhythm: Normal rate and regular rhythm  Pulses: Normal pulses  Heart sounds: Normal heart sounds  Pulmonary:      Effort: Pulmonary effort is normal       Breath sounds: Normal breath sounds  Skin:     Capillary Refill: Capillary refill takes less than 2 seconds  Neurological:      General: No focal deficit present  Mental Status: She is alert and oriented to person, place, and time     Psychiatric:         Mood and Affect: Mood normal          Behavior: Behavior normal

## 2022-02-24 NOTE — TELEPHONE ENCOUNTER
Procedures   Pt called asking if it would be okay for her to start taking her multi vitamin and magnesium again since her hysterectomy  Please advise

## 2022-05-09 ENCOUNTER — TELEPHONE (OUTPATIENT)
Dept: SURGICAL ONCOLOGY | Facility: CLINIC | Age: 52
End: 2022-05-09

## 2022-05-09 NOTE — TELEPHONE ENCOUNTER
Called patient and left message to have labs done (calcium, PTH, vit D) prior to appt on 5/11/22 as soon as possible

## 2022-05-10 ENCOUNTER — TELEPHONE (OUTPATIENT)
Dept: HEMATOLOGY ONCOLOGY | Facility: CLINIC | Age: 52
End: 2022-05-10

## 2022-05-10 NOTE — TELEPHONE ENCOUNTER
Reschedule Appointment     Who is calling in Patient    Doctor Appointment Scheduled with Dr Rolan Daniel date and time 05/11 at 1:15pm   New date and time 06/08 at 1:00pm   [facility]   Patient verbalized understanding    yes

## 2022-06-06 ENCOUNTER — APPOINTMENT (OUTPATIENT)
Dept: LAB | Facility: CLINIC | Age: 52
End: 2022-06-06
Payer: COMMERCIAL

## 2022-06-06 DIAGNOSIS — E89.2 STATUS POST PARATHYROIDECTOMY (HCC): ICD-10-CM

## 2022-06-06 DIAGNOSIS — D50.9 IRON DEFICIENCY ANEMIA, UNSPECIFIED IRON DEFICIENCY ANEMIA TYPE: ICD-10-CM

## 2022-06-06 DIAGNOSIS — E21.3 HYPERPARATHYROIDISM (HCC): ICD-10-CM

## 2022-06-06 LAB
FERRITIN SERPL-MCNC: 52 NG/ML (ref 8–388)
IRON SATN MFR SERPL: 27 % (ref 15–50)
IRON SERPL-MCNC: 78 UG/DL (ref 50–170)
PTH-INTACT SERPL-MCNC: 47.4 PG/ML (ref 18.4–80.1)
TIBC SERPL-MCNC: 294 UG/DL (ref 250–450)
TSH SERPL DL<=0.05 MIU/L-ACNC: 1.78 UIU/ML (ref 0.45–4.5)

## 2022-06-06 PROCEDURE — 83540 ASSAY OF IRON: CPT

## 2022-06-06 PROCEDURE — 83970 ASSAY OF PARATHORMONE: CPT

## 2022-06-06 PROCEDURE — 82306 VITAMIN D 25 HYDROXY: CPT

## 2022-06-06 PROCEDURE — 82728 ASSAY OF FERRITIN: CPT

## 2022-06-06 PROCEDURE — 84443 ASSAY THYROID STIM HORMONE: CPT

## 2022-06-06 PROCEDURE — 83550 IRON BINDING TEST: CPT

## 2022-06-08 ENCOUNTER — OFFICE VISIT (OUTPATIENT)
Dept: SURGICAL ONCOLOGY | Facility: CLINIC | Age: 52
End: 2022-06-08
Payer: COMMERCIAL

## 2022-06-08 VITALS
HEART RATE: 76 BPM | DIASTOLIC BLOOD PRESSURE: 74 MMHG | WEIGHT: 142 LBS | SYSTOLIC BLOOD PRESSURE: 120 MMHG | HEIGHT: 65 IN | TEMPERATURE: 97.1 F | BODY MASS INDEX: 23.66 KG/M2 | OXYGEN SATURATION: 96 % | RESPIRATION RATE: 14 BRPM

## 2022-06-08 DIAGNOSIS — E89.2 STATUS POST PARATHYROIDECTOMY (HCC): Primary | ICD-10-CM

## 2022-06-08 PROCEDURE — 3008F BODY MASS INDEX DOCD: CPT | Performed by: SURGERY

## 2022-06-08 PROCEDURE — 99213 OFFICE O/P EST LOW 20 MIN: CPT | Performed by: SURGERY

## 2022-06-08 NOTE — PROGRESS NOTES
Surgical Oncology Follow Up       [address]  CANCER [facility] SURGICAL ONCOLOGY [facility]  [address] [facility] [address]    [place]  1970  [id number]  [address] [facility] SURGICAL ONCOLOGY [facility]  [address] [facility] [address]    Chief Complaint   Patient presents with    Follow-up       Assessment/Plan:    No problem-specific Assessment & Plan notes found for this encounter  Diagnoses and all orders for this visit:    Status post parathyroidectomy [facility])        Advance Care Planning/Advance Directives:  Discussed disease status, cancer treatment plans and/or cancer treatment goals with the patient  Oncology History    No history exists  History of Present Illness:  Patient is a 60-year-old woman here for follow-up status post parathyroidectomy 6 months ago   -Interval History:  She feels better since the operation  Review of Systems:  Review of Systems   Constitutional: Negative  Negative for fatigue  HENT: Negative  Eyes: Negative  Respiratory: Negative  Cardiovascular: Negative  Gastrointestinal: Negative  Endocrine: Negative  Genitourinary: Negative  Musculoskeletal: Negative  Skin: Negative  Allergic/Immunologic: Negative  Neurological: Negative  Hematological: Negative  Psychiatric/Behavioral: Negative          Patient Active Problem List   Diagnosis    Migraine without aura, intractable    Primary insomnia    CNS demyelination ([address] )    Episodic cluster headache, not intractable    Cervical disc disorder with radiculopathy of mid-cervical region    Hyperparathyroidism ([address] )    Status post parathyroidectomy [facility])     Past Medical History:   Diagnosis Date    Abnormal Pap smear of cervix     Anemia     Anxiety     Complete uterovaginal prolapse     Constipation     HPV (human papilloma virus) infection     Migraines     PONV (postoperative nausea and vomiting)     Seasonal allergies     Tinnitus     Varicella     Wears contact lenses      Past Surgical History:   Procedure Laterality Date    CATARACT EXTRACTION Bilateral      SECTION      COLONOSCOPY      CYSTOSCOPY N/A 2018    Procedure: CYSTOSCOPY;  Surgeon: Nito Gonzáles MD;  [facility];  Service: Gynecology    EYE SURGERY      HYSTERECTOMY  2018    FL EXPLORE PARATHYROID GLANDS N/A 10/14/2021    Procedure: PARATHYROIDECTOMY, MINIMALLY INVASIVE, LEFT INFERIOR WITH intraoperative PTH monitoring;  Surgeon: Ralph Grant MD;  [facility];  Service: Surgical Oncology    FL REVAGINAL PROLAPSE,SACROSP LIG Right 2018    Procedure: FIXATION LIGAMENT SACROSPINOUS, RIGHT;  Surgeon: Nito Gonzáles MD;  [facility];  Service: Gynecology    FL VAGINAL HYSTERECTOMY,UTERUS 250 GMS/< N/A 2018    Procedure: TOTAL VAG  HYSTERECTOMY;  Surgeon: Nito Gonzáles MD;  [facility];  Service: Gynecology    WISDOM TOOTH EXTRACTION       Family History   Problem Relation Age of Onset    Kidney failure Father         acute per allscripts    Diabetes type II Father     No Known Problems Maternal Grandmother     No Known Problems Maternal Grandfather     No Known Problems Paternal Grandmother     Colon cancer Paternal Grandfather 58    No Known Problems Brother     No Known Problems Son     No Known Problems Son     No Known Problems Son     No Known Problems Paternal Aunt      Social History     Socioeconomic History    Marital status: Single     Spouse name: Not on file    Number of children: 3    Years of education: Not on file    Highest education level: Not on file   Occupational History    Not on file   Tobacco Use    Smoking status: Never Smoker    Smokeless tobacco: Never Used   Vaping Use    Vaping Use: Not on file   Substance and Sexual Activity    Alcohol use: No    Drug use: No    Sexual activity: Not Currently   Other Topics Concern  Not on file   Social History Narrative    Caffeine use    Uses safety equipment seatbelts     Social Determinants of Health     Financial Resource Strain: Not on file   Food Insecurity: Not on file   Transportation Needs: Not on file   Physical Activity: Not on file   Stress: Not on file   Social Connections: Not on file   Intimate Partner Violence: Not on file   Housing Stability: Not on file       Current Outpatient Medications:     ALPRAZolam (XANAX) 0 25 mg tablet, TAKE 1 TABLET BY MOUTH AT BEDTIME AS NEEDED FOR ANXIETY, Disp: 30 tablet, Rfl: 0    fluticasone (FLONASE) 50 mcg/act nasal spray, 2 sprays into each nostril daily, Disp: 16 g, Rfl: 0    ibuprofen (MOTRIN) 600 mg tablet, TAKE ONE TABLET BY MOUTH EVERY 6 HOURS AS NEEDED FOR MILD PAIN, Disp: 30 tablet, Rfl: 0    Multiple Vitamin (MULTIVITAMIN) capsule, Take 1 capsule by mouth daily , Disp: , Rfl:     rizatriptan (MAXALT-MLT) 10 MG disintegrating tablet, DISSOLVE ONE TABLET BY MOUTH ONCE AS NEEDED FOR MIGRAINE FOR UP TO ONE DOSE MAY REPEAT IN IN TWO HOURS IF NEEDED, Disp: 9 tablet, Rfl: 0  Allergies   Allergen Reactions    Latex Rash    Sulfamethoxazole-Trimethoprim Rash     Vitals:    06/08/22 1258   BP: 120/74   Pulse: 76   Resp: 14   Temp: (!) 97 1 °F (36 2 °C)   SpO2: 96%       Physical Exam  Vitals reviewed  Constitutional:       Appearance: Normal appearance  HENT:      Head: Normocephalic and atraumatic  Right Ear: External ear normal       Left Ear: External ear normal       Mouth/Throat:      Mouth: Mucous membranes are dry  Eyes:      Extraocular Movements: Extraocular movements intact  Pupils: Pupils are equal, round, and reactive to light  Cardiovascular:      Rate and Rhythm: Normal rate and regular rhythm  Heart sounds: Normal heart sounds  Pulmonary:      Effort: Pulmonary effort is normal       Breath sounds: Normal breath sounds  Abdominal:      General: Abdomen is flat        Palpations: Abdomen is soft    Musculoskeletal:         General: Normal range of motion  Cervical back: Normal range of motion and neck supple  No rigidity or tenderness  Lymphadenopathy:      Cervical: No cervical adenopathy  Skin:     General: Skin is warm and dry  Neurological:      General: No focal deficit present  Mental Status: She is alert and oriented to person, place, and time  Psychiatric:         Mood and Affect: Mood normal          Behavior: Behavior normal          Thought Content: Thought content normal          Judgment: Judgment normal            Results:  Labs:  Lab Results   Component Value Date    PTH 47 4 06/06/2022    CALCIUM 9 2 06/06/2022         Imaging  No results found  I reviewed the above laboratory and imaging data  Discussion/Summary:  48-year-old woman status post parathyroidectomy  Calcium/PTH levels now normal   Will follow-up as needed  Results discussed with patient

## 2022-06-11 LAB
25(OH)D2 SERPL-MCNC: <1 NG/ML
25(OH)D3 SERPL-MCNC: 28 NG/ML
25(OH)D3+25(OH)D2 SERPL-MCNC: 28 NG/ML

## 2022-06-12 DIAGNOSIS — M54.50 CHRONIC BILATERAL LOW BACK PAIN WITHOUT SCIATICA: ICD-10-CM

## 2022-06-12 DIAGNOSIS — G89.29 CHRONIC BILATERAL LOW BACK PAIN WITHOUT SCIATICA: ICD-10-CM

## 2022-06-12 DIAGNOSIS — M54.12 CERVICAL RADICULOPATHY: ICD-10-CM

## 2022-06-12 RX ORDER — IBUPROFEN 600 MG/1
TABLET ORAL
Qty: 30 TABLET | Refills: 0 | Status: SHIPPED | OUTPATIENT
Start: 2022-06-12

## 2022-06-17 ENCOUNTER — VBI (OUTPATIENT)
Dept: ADMINISTRATIVE | Facility: OTHER | Age: 52
End: 2022-06-17

## 2022-07-09 ENCOUNTER — OFFICE VISIT (OUTPATIENT)
Dept: URGENT CARE | Age: 52
End: 2022-07-09
Payer: COMMERCIAL

## 2022-07-09 VITALS
HEIGHT: 65 IN | HEART RATE: 102 BPM | RESPIRATION RATE: 16 BRPM | DIASTOLIC BLOOD PRESSURE: 69 MMHG | TEMPERATURE: 97.7 F | WEIGHT: 144 LBS | BODY MASS INDEX: 23.99 KG/M2 | OXYGEN SATURATION: 97 % | SYSTOLIC BLOOD PRESSURE: 136 MMHG

## 2022-07-09 DIAGNOSIS — S39.012A STRAIN OF MUSCLE, FASCIA AND TENDON OF LOWER BACK, INITIAL ENCOUNTER: Primary | ICD-10-CM

## 2022-07-09 PROCEDURE — S9083 URGENT CARE CENTER GLOBAL: HCPCS

## 2022-07-09 PROCEDURE — G0382 LEV 3 HOSP TYPE B ED VISIT: HCPCS

## 2022-07-09 RX ORDER — METHYLPREDNISOLONE 4 MG/1
TABLET ORAL
Qty: 21 TABLET | Refills: 0 | Status: SHIPPED | OUTPATIENT
Start: 2022-07-09 | End: 2022-07-15

## 2022-07-09 NOTE — PROGRESS NOTES
[address]        NAME: Kasey Ashraf is a 47 y o  female  : 1970    MRN: [id number]  DATE: 2022  TIME: 1:36 PM    Assessment and Plan   Strain of muscle, fascia and tendon of lower back, initial encounter [H03 [id number]]  1  Strain of muscle, fascia and tendon of lower back, initial encounter  methylPREDNISolone 4 MG tablet therapy pack         Patient Instructions     Steroid taper as discussed  Ice/heat to affected area  Ibuprofen or Tylenol as needed for pain  Follow up with PCP in 3-5 days  Proceed to  ER if symptoms worsen  Chief Complaint     Chief Complaint   Patient presents with    Back Pain     Started yesterday   The patient left the office before the visit was finished  Mid low back    illicted by movement    had left abd pain the day before with aches and possible fever    that had resolved before the back pain began    History of Present Illness       Patient presenting for evaluation of right-sided low back pain  She states the pain started 1 day ago  She denies any direct trauma or injury to the area  She states that the pain worsens with bending and twisting  She denies any numbness or tingling, radiation of the pain, bowel or bladder dysfunction or saddle anesthesia  Patient denies any urinary symptoms including urinary frequency or urgency, or dysuria  Patient states that she has been taking Advil with mild improvement the pain  Review of Systems   Review of Systems   Constitutional: Negative for chills and fever  HENT: Negative for ear pain and sore throat  Eyes: Negative for pain and visual disturbance  Respiratory: Negative for cough and shortness of breath  Cardiovascular: Negative for chest pain and palpitations  Gastrointestinal: Negative for abdominal pain and vomiting  Genitourinary: Negative for dysuria and hematuria  Musculoskeletal: Positive for back pain  Negative for arthralgias     Skin: Negative for color change and rash    Neurological: Negative for seizures and syncope  All other systems reviewed and are negative  Current Medications       Current Outpatient Medications:     ibuprofen (MOTRIN) 600 mg tablet, TAKE ONE TABLET BY MOUTH EVERY 6 HOURS AS NEEDED FOR MILD PAIN, Disp: 30 tablet, Rfl: 0    methylPREDNISolone 4 MG tablet therapy pack, Take 6 tablets (24 mg total) by mouth daily for 1 day, THEN 5 tablets (20 mg total) daily for 1 day, THEN 4 tablets (16 mg total) daily for 1 day, THEN 3 tablets (12 mg total) daily for 1 day, THEN 2 tablets (8 mg total) daily for 1 day, THEN 1 tablet (4 mg total) daily for 1 day  Use as directed on package  , Disp: 21 tablet, Rfl: 0    Multiple Vitamin (MULTIVITAMIN) capsule, Take 1 capsule by mouth daily , Disp: , Rfl:     rizatriptan (MAXALT-MLT) 10 MG disintegrating tablet, DISSOLVE ONE TABLET BY MOUTH ONCE AS NEEDED FOR MIGRAINE FOR UP TO ONE DOSE MAY REPEAT IN IN TWO HOURS IF NEEDED, Disp: 9 tablet, Rfl: 0    ALPRAZolam (XANAX) 0 25 mg tablet, TAKE 1 TABLET BY MOUTH AT BEDTIME AS NEEDED FOR ANXIETY (Patient not taking: Reported on 7/9/2022), Disp: 30 tablet, Rfl: 0    fluticasone (FLONASE) 50 mcg/act nasal spray, 2 sprays into each nostril daily (Patient not taking: Reported on 7/9/2022), Disp: 16 g, Rfl: 0    Current Allergies     Allergies as of 07/09/2022 - Reviewed 07/09/2022   Allergen Reaction Noted    Latex Rash 09/20/2018    Sulfamethoxazole-trimethoprim Rash             The following portions of the patient's history were reviewed and updated as appropriate: allergies, current medications, past family history, past medical history, past social history, past surgical history and problem list      Past Medical History:   Diagnosis Date    Abnormal Pap smear of cervix     Anemia     Anxiety     Complete uterovaginal prolapse     Constipation     HPV (human papilloma virus) infection     Migraines     PONV (postoperative nausea and vomiting)     Seasonal allergies     Tinnitus     Varicella     Wears contact lenses        Past Surgical History:   Procedure Laterality Date    CATARACT EXTRACTION Bilateral      SECTION      COLONOSCOPY      CYSTOSCOPY N/A 2018    Procedure: CYSTOSCOPY;  Surgeon: Maye Cade MD;  [facility];  Service: Gynecology    EYE SURGERY      HYSTERECTOMY  2018    [place] EXPLORE PARATHYROID GLANDS N/A 10/14/2021    Procedure: PARATHYROIDECTOMY, MINIMALLY INVASIVE, LEFT INFERIOR WITH intraoperative PTH monitoring;  Surgeon: Baldemar Fernandez MD;  [facility];  Service: Surgical Oncology    MN REVAGINAL PROLAPSE,SACROSP LIG Right 2018    Procedure: FIXATION LIGAMENT SACROSPINOUS, RIGHT;  Surgeon: Maye Cade MD;  [facility];  Service: Gynecology    MN VAGINAL HYSTERECTOMY,UTERUS 250 GMS/< N/A 2018    Procedure: TOTAL VAG  HYSTERECTOMY;  Surgeon: Maye Cade MD;  [facility];  Service: Gynecology    WISDOM TOOTH EXTRACTION         Family History   Problem Relation Age of Onset    Kidney failure Father         acute per allscripts    Diabetes type II Father     No Known Problems Maternal Grandmother     No Known Problems Maternal Grandfather     No Known Problems Paternal Grandmother     Colon cancer Paternal Grandfather 58    No Known Problems Brother     No Known Problems Son     No Known Problems Son     No Known Problems Son     No Known Problems Paternal Aunt          Medications have been verified  Objective   /69   Pulse 102   Temp 97 7 °F (36 5 °C)   Resp 16   Ht 5' 5" (1 651 m)   Wt 65 3 kg (144 lb)   LMP 09/10/2018   SpO2 97%   BMI 23 96 kg/m²        Physical Exam     Physical Exam  Vitals and nursing note reviewed  Constitutional:       General: She is not in acute distress  Appearance: Normal appearance  She is normal weight  She is not ill-appearing, toxic-appearing or diaphoretic     HENT:      Head: Normocephalic and atraumatic  Right Ear: Tympanic membrane normal       Left Ear: Tympanic membrane normal       Nose: Nose normal  No congestion or rhinorrhea  Mouth/Throat:      Mouth: Mucous membranes are moist       Pharynx: Oropharynx is clear  No oropharyngeal exudate or posterior oropharyngeal erythema  Eyes:      General:         Right eye: No discharge  Left eye: No discharge  Extraocular Movements: Extraocular movements intact  Conjunctiva/sclera: Conjunctivae normal       Pupils: Pupils are equal, round, and reactive to light  Cardiovascular:      Rate and Rhythm: Normal rate and regular rhythm  Pulses: Normal pulses  Heart sounds: Normal heart sounds  No murmur heard  No friction rub  No gallop  Pulmonary:      Effort: No respiratory distress  Breath sounds: Normal breath sounds  No wheezing, rhonchi or rales  Abdominal:      General: Abdomen is flat  Bowel sounds are normal       Palpations: Abdomen is soft  Tenderness: There is no abdominal tenderness  There is no right CVA tenderness, left CVA tenderness, guarding or rebound  Musculoskeletal:         General: Tenderness present  No swelling, deformity or signs of injury  Cervical back: Normal range of motion and neck supple  Lumbar back: Tenderness present  No swelling, edema, deformity, signs of trauma, lacerations, spasms or bony tenderness  Decreased range of motion  Negative right straight leg raise test and negative left straight leg raise test  No scoliosis  Back:    Skin:     General: Skin is warm and dry  Capillary Refill: Capillary refill takes less than 2 seconds  Neurological:      General: No focal deficit present  Mental Status: She is alert and oriented to person, place, and time     Psychiatric:         Mood and Affect: Mood normal          Behavior: Behavior normal

## 2022-07-09 NOTE — PATIENT INSTRUCTIONS
Please take steroid as follows:  6 tabs day 1, 5 tabs day 2, 4 tabs on day 3, 3 tabs day for, 2 tabs day 5, 1 tab day 6  You may alternate ibuprofen and Tylenol as needed for pain  He may apply ice for the 1st 24 hours, followed by moist heat  You apply icy Hot or over-the-counter Lidoderm if needed

## 2022-07-22 DIAGNOSIS — G43.019 MIGRAINE WITHOUT AURA, INTRACTABLE: ICD-10-CM

## 2022-07-22 RX ORDER — RIZATRIPTAN BENZOATE 10 MG/1
TABLET, ORALLY DISINTEGRATING ORAL
Qty: 9 TABLET | Refills: 0 | Status: SHIPPED | OUTPATIENT
Start: 2022-07-22 | End: 2022-10-04 | Stop reason: SDUPTHER

## 2022-09-22 ENCOUNTER — VBI (OUTPATIENT)
Dept: ADMINISTRATIVE | Facility: OTHER | Age: 52
End: 2022-09-22

## 2022-10-04 ENCOUNTER — OFFICE VISIT (OUTPATIENT)
Dept: FAMILY MEDICINE CLINIC | Facility: CLINIC | Age: 52
End: 2022-10-04
Payer: COMMERCIAL

## 2022-10-04 VITALS
OXYGEN SATURATION: 100 % | DIASTOLIC BLOOD PRESSURE: 78 MMHG | HEIGHT: 65 IN | RESPIRATION RATE: 16 BRPM | WEIGHT: 147 LBS | TEMPERATURE: 98.7 F | SYSTOLIC BLOOD PRESSURE: 120 MMHG | BODY MASS INDEX: 24.49 KG/M2 | HEART RATE: 66 BPM

## 2022-10-04 DIAGNOSIS — Z00.00 ANNUAL PHYSICAL EXAM: Primary | ICD-10-CM

## 2022-10-04 DIAGNOSIS — E21.3 HYPERPARATHYROIDISM (HCC): ICD-10-CM

## 2022-10-04 DIAGNOSIS — G37.9 CNS DEMYELINATION (HCC): ICD-10-CM

## 2022-10-04 DIAGNOSIS — Z12.11 COLON CANCER SCREENING: ICD-10-CM

## 2022-10-04 DIAGNOSIS — E89.2 STATUS POST PARATHYROIDECTOMY (HCC): ICD-10-CM

## 2022-10-04 DIAGNOSIS — Z12.31 VISIT FOR SCREENING MAMMOGRAM: ICD-10-CM

## 2022-10-04 DIAGNOSIS — M50.120 CERVICAL DISC DISORDER WITH RADICULOPATHY OF MID-CERVICAL REGION: ICD-10-CM

## 2022-10-04 DIAGNOSIS — G43.019 MIGRAINE WITHOUT AURA, INTRACTABLE: ICD-10-CM

## 2022-10-04 PROCEDURE — 99396 PREV VISIT EST AGE 40-64: CPT | Performed by: FAMILY MEDICINE

## 2022-10-04 RX ORDER — RIZATRIPTAN BENZOATE 10 MG/1
TABLET, ORALLY DISINTEGRATING ORAL
Qty: 9 TABLET | Refills: 0 | Status: SHIPPED | OUTPATIENT
Start: 2022-10-04

## 2022-10-04 NOTE — PROGRESS NOTES
[address]    NAME: Ferny   AGE: 47 y o  SEX: female  : 1970     DATE: 10/4/2022     Assessment and Plan:     Problem List Items Addressed This Visit        Endocrine    Hyperparathyroidism ([address] )     Recent PTH was normal in 2022  Continue to monitor             Cardiovascular and Mediastinum    Migraine without aura, intractable     To restart Magnesium 500 mg at bedtime          Relevant Medications    rizatriptan (MAXALT-MLT) 10 mg disintegrating tablet       Nervous and Auditory    CNS demyelination (HCC)     Stable  Seen by neurology         Cervical disc disorder with radiculopathy of mid-cervical region     Currently in PT for this   advil as needed            Other    Status post parathyroidectomy [facility])     Doing well            Relevant Orders    Comprehensive metabolic panel      Other Visit Diagnoses     Annual physical exam    -  Primary    Relevant Orders    Lipid Panel with Direct LDL reflex    Visit for screening mammogram        Relevant Orders    Mammo screening bilateral w 3d & cad    Colon cancer screening        Relevant Orders    Cologuard          Immunizations and preventive care screenings were discussed with patient today  Appropriate education was printed on patient's after visit summary  Counseling:  Alcohol/drug use: discussed moderation in alcohol intake, the recommendations for healthy alcohol use, and avoidance of illicit drug use  Dental Health: discussed importance of regular tooth brushing, flossing, and dental visits  Injury prevention: discussed safety/seat belts, safety helmets, smoke detectors, carbon dioxide detectors, and smoking near bedding or upholstery  Sexual health: discussed sexually transmitted diseases, partner selection, use of condoms, avoidance of unintended pregnancy, and contraceptive alternatives    Exercise: the importance of regular exercise/physical activity was discussed  Recommend exercise 3-5 times per week for at least 30 minutes  BMI Counseling: Body mass index is 24 76 kg/m²  The BMI is above normal  Nutrition recommendations include decreasing portion sizes and encouraging healthy choices of fruits and vegetables  Exercise recommendations include moderate physical activity 150 minutes/week  No pharmacotherapy was ordered  Rationale for BMI follow-up plan is due to patient being overweight or obese  No follow-ups on file  Chief Complaint:     Chief Complaint   Patient presents with    Physical Exam     Patient is here today for an annual exam       History of Present Illness:     Adult Annual Physical   Patient here for a comprehensive physical exam  The patient reports no problems  Diet and Physical Activity  Diet/Nutrition: well balanced diet and consuming 3-5 servings of fruits/vegetables daily  Exercise: walking  Depression Screening  PHQ-2/9 Depression Screening         General Health  Sleep: sleeps well and gets 7-8 hours of sleep on average  Hearing: normal - bilateral   Vision: goes for regular eye exams  Dental: regular dental visits and brushes teeth twice daily  /GYN Health  Patient is: postmenopausal  Last menstrual period: hysterectomy 2017  Contraceptive method: none  Review of Systems:     Review of Systems   Constitutional: Negative  HENT: Negative  Eyes: Negative  Respiratory: Negative  Cardiovascular: Negative  Gastrointestinal: Negative  Endocrine: Negative  Genitourinary: Negative  Musculoskeletal: Negative  Skin: Negative  Allergic/Immunologic: Negative  Neurological: Negative  Hematological: Negative  Psychiatric/Behavioral: Negative         Past Medical History:     Past Medical History:   Diagnosis Date    Abnormal Pap smear of cervix     Anemia     Anxiety     Complete uterovaginal prolapse     Constipation     HPV (human papilloma virus) infection     Migraines     PONV (postoperative nausea and vomiting)     Seasonal allergies     Tinnitus     Varicella     Wears contact lenses       Past Surgical History:     Past Surgical History:   Procedure Laterality Date    CATARACT EXTRACTION Bilateral      SECTION      COLONOSCOPY      CYSTOSCOPY N/A 2018    Procedure: CYSTOSCOPY;  Surgeon: Sandy Shannon MD;  [facility];  Service: Gynecology    EYE SURGERY      HYSTERECTOMY  2018    [place] EXPLORE PARATHYROID GLANDS N/A 10/14/2021    Procedure: PARATHYROIDECTOMY, MINIMALLY INVASIVE, LEFT INFERIOR WITH intraoperative PTH monitoring;  Surgeon: Aviva Ontiveros MD;  [facility];  Service: Surgical Oncology    [place] REVAGINAL PROLAPSE,SACROSP LIG Right 2018    Procedure: FIXATION LIGAMENT SACROSPINOUS, RIGHT;  Surgeon: Sandy Shannon MD;  [facility];  Service: Gynecology    [place] VAGINAL HYSTERECTOMY,UTERUS 250 GMS/< N/A 2018    Procedure: TOTAL VAG  HYSTERECTOMY;  Surgeon: Sandy Shannon MD;  [facility];  Service: Gynecology    WISDOM TOOTH EXTRACTION        Social History:     Social History     Socioeconomic History    Marital status: Single     Spouse name: None    Number of children: 3    Years of education: None    Highest education level: None   Occupational History    None   Tobacco Use    Smoking status: Never Smoker    Smokeless tobacco: Never Used   Vaping Use    Vaping Use: None   Substance and Sexual Activity    Alcohol use: No    Drug use: No    Sexual activity: Not Currently   Other Topics Concern    None   Social History Narrative    Caffeine use    Uses safety equipment seatbelts     Social Determinants of Health     Financial Resource Strain: Not on file   Food Insecurity: Not on file   Transportation Needs: Not on file   Physical Activity: Not on file   Stress: Not on file   Social Connections: Not on file   Intimate Partner Violence: Not on file   Housing Stability: Not on file      Family History:     Family History   Problem Relation Age of Onset    Kidney failure Father         acute per allscripts    Diabetes type II Father     No Known Problems Maternal Grandmother     No Known Problems Maternal Grandfather     No Known Problems Paternal Grandmother     Colon cancer Paternal Grandfather 58    No Known Problems Brother     No Known Problems Son     No Known Problems Son     No Known Problems Son     No Known Problems Paternal Aunt       Current Medications:     Current Outpatient Medications   Medication Sig Dispense Refill    ibuprofen (MOTRIN) 600 mg tablet TAKE ONE TABLET BY MOUTH EVERY 6 HOURS AS NEEDED FOR MILD PAIN 30 tablet 0    Multiple Vitamin (MULTIVITAMIN) capsule Take 1 capsule by mouth daily       rizatriptan (MAXALT-MLT) 10 mg disintegrating tablet DISSOLVE 1 TABLET IN MOUTH ONCE AS NEEDED FOR MIGRAINE FOR UP TO ONE DOSE  MAY REPEAT IN TWO HOURS IF NEEDED 9 tablet 0     No current facility-administered medications for this visit  Allergies: Allergies   Allergen Reactions    Latex Rash    Sulfamethoxazole-Trimethoprim Rash      Physical Exam:     /78 (BP Location: Left arm, Patient Position: Sitting, Cuff Size: Adult)   Pulse 66   Temp 98 7 °F (37 1 °C) (Tympanic)   Resp 16   Ht 5' 4 61" (1 641 m)   Wt 66 7 kg (147 lb)   LMP 09/10/2018   SpO2 100%   BMI 24 76 kg/m²     Physical Exam  Constitutional:       Appearance: She is well-developed  HENT:      Head: Normocephalic and atraumatic  Right Ear: Tympanic membrane normal       Left Ear: Tympanic membrane normal       Nose: Nose normal       Mouth/Throat:      Mouth: Mucous membranes are moist    Eyes:      Pupils: Pupils are equal, round, and reactive to light  Cardiovascular:      Rate and Rhythm: Normal rate and regular rhythm  Pulses: Normal pulses  Heart sounds: Normal heart sounds     Pulmonary:      Effort: Pulmonary effort is normal  No respiratory distress  Breath sounds: Normal breath sounds  No wheezing  Abdominal:      General: Bowel sounds are normal       Palpations: Abdomen is soft  Musculoskeletal:         General: No deformity  Normal range of motion  Cervical back: Normal range of motion and neck supple  Skin:     General: Skin is warm  Capillary Refill: Capillary refill takes less than 2 seconds  Neurological:      Mental Status: She is alert and oriented to person, place, and time     Psychiatric:         Behavior: Behavior normal           Mary Harrington MD  [address] [facility]

## 2022-10-07 ENCOUNTER — APPOINTMENT (OUTPATIENT)
Dept: LAB | Facility: CLINIC | Age: 52
End: 2022-10-07
Payer: COMMERCIAL

## 2022-10-07 DIAGNOSIS — Z00.00 ANNUAL PHYSICAL EXAM: ICD-10-CM

## 2022-10-07 LAB
ALBUMIN SERPL BCP-MCNC: 3.8 G/DL (ref 3.5–5)
ALP SERPL-CCNC: 69 U/L (ref 46–116)
ALT SERPL W P-5'-P-CCNC: 34 U/L (ref 12–78)
ANION GAP SERPL CALCULATED.3IONS-SCNC: 4 MMOL/L (ref 4–13)
AST SERPL W P-5'-P-CCNC: 13 U/L (ref 5–45)
BILIRUB SERPL-MCNC: 0.56 MG/DL (ref 0.2–1)
BUN SERPL-MCNC: 12 MG/DL (ref 5–25)
CALCIUM SERPL-MCNC: 8.5 MG/DL (ref 8.3–10.1)
CHLORIDE SERPL-SCNC: 107 MMOL/L (ref 96–108)
CHOLEST SERPL-MCNC: 162 MG/DL
CO2 SERPL-SCNC: 28 MMOL/L (ref 21–32)
CREAT SERPL-MCNC: 0.68 MG/DL (ref 0.6–1.3)
GFR SERPL CREATININE-BSD FRML MDRD: 100 ML/MIN/1.73SQ M
GLUCOSE P FAST SERPL-MCNC: 104 MG/DL (ref 65–99)
HDLC SERPL-MCNC: 65 MG/DL
LDLC SERPL CALC-MCNC: 70 MG/DL (ref 0–100)
POTASSIUM SERPL-SCNC: 4 MMOL/L (ref 3.5–5.3)
PROT SERPL-MCNC: 7 G/DL (ref 6.4–8.4)
SODIUM SERPL-SCNC: 139 MMOL/L (ref 135–147)
TRIGL SERPL-MCNC: 137 MG/DL

## 2022-10-07 PROCEDURE — 80053 COMPREHEN METABOLIC PANEL: CPT | Performed by: FAMILY MEDICINE

## 2022-10-07 PROCEDURE — 80061 LIPID PANEL: CPT

## 2022-10-07 PROCEDURE — 36415 COLL VENOUS BLD VENIPUNCTURE: CPT | Performed by: FAMILY MEDICINE

## 2022-12-01 LAB — COLOGUARD RESULT REPORTABLE: NEGATIVE

## 2023-01-06 ENCOUNTER — OFFICE VISIT (OUTPATIENT)
Dept: FAMILY MEDICINE CLINIC | Facility: CLINIC | Age: 53
End: 2023-01-06

## 2023-01-06 ENCOUNTER — PATIENT MESSAGE (OUTPATIENT)
Dept: FAMILY MEDICINE CLINIC | Facility: CLINIC | Age: 53
End: 2023-01-06

## 2023-01-06 VITALS
SYSTOLIC BLOOD PRESSURE: 120 MMHG | BODY MASS INDEX: 24.93 KG/M2 | RESPIRATION RATE: 16 BRPM | WEIGHT: 149.6 LBS | HEIGHT: 65 IN | DIASTOLIC BLOOD PRESSURE: 82 MMHG | HEART RATE: 76 BPM | TEMPERATURE: 99 F | OXYGEN SATURATION: 100 %

## 2023-01-06 DIAGNOSIS — B35.1 ONYCHOMYCOSIS OF GREAT TOE: Primary | ICD-10-CM

## 2023-01-06 RX ORDER — TERBINAFINE HYDROCHLORIDE 0.84 G/125ML
1 LIQUID TOPICAL DAILY
Qty: 125 ML | Refills: 0 | Status: SHIPPED | OUTPATIENT
Start: 2023-01-06 | End: 2023-01-06

## 2023-01-06 NOTE — PROGRESS NOTES
Name: Shira Robb      : 1970      MRN: [id number]  Encounter Provider: Sydnie So MD  Encounter Date: 2023   Encounter department: [place] [address]  Onychomycosis of great toe  Comments:  Findings consistent with fungal infection  Reassurance provided  Did explain the most effective treatment is systemic  antifungal but does carry risk  Will start with Jublia once daily  May take up several months to heal  Suggest she file down the nail to allow better penetration  If not improved, will try a systemic med  Last LFT was normal   Orders:  -     Efinaconazole 10 % SOLN; Apply 1 application topically in the morning                   Subjective      Here with yellow discoloration of the nails x 6 months  Noticed bruising a few weeks ago  No pain or injuries  Discoloration is localized to the nail     Review of Systems    Current Outpatient Medications on File Prior to Visit   Medication Sig   • ibuprofen (MOTRIN) 600 mg tablet TAKE ONE TABLET BY MOUTH EVERY 6 HOURS AS NEEDED FOR MILD PAIN   • Multiple Vitamin (MULTIVITAMIN) capsule Take 1 capsule by mouth daily    • rizatriptan (MAXALT-MLT) 10 mg disintegrating tablet DISSOLVE 1 TABLET IN MOUTH ONCE AS NEEDED FOR MIGRAINE FOR UP TO ONE DOSE  MAY REPEAT IN TWO HOURS IF NEEDED       Objective     /82 (BP Location: Left arm, Patient Position: Sitting, Cuff Size: Adult)   Pulse 76   Temp 99 °F (37 2 °C) (Tympanic)   Resp 16   Ht 5' 4 61" (1 641 m)   Wt 67 9 kg (149 lb 9 6 oz)   LMP 09/10/2018   SpO2 100%   BMI 25 20 kg/m²     Physical Exam  Constitutional:       General: She is not in acute distress  Appearance: Normal appearance  She is not ill-appearing  HENT:      Head: Normocephalic and atraumatic  Pulmonary:      Effort: Pulmonary effort is normal  No respiratory distress  Skin:     General: Skin is warm        Comments: Great toe nail discolored    Neurological:      Mental Status: She is alert and oriented to person, place, and time         Prisca Crowell MD

## 2023-01-10 ENCOUNTER — TELEPHONE (OUTPATIENT)
Dept: FAMILY MEDICINE CLINIC | Facility: CLINIC | Age: 53
End: 2023-01-10

## 2023-01-10 DIAGNOSIS — B35.1 ONYCHOMYCOSIS OF GREAT TOE: Primary | ICD-10-CM

## 2023-01-10 NOTE — TELEPHONE ENCOUNTER
Patient saw you Friday and was prescribed Efinaconazole 10% solution - her insurance will not cover it   She is going to check out of pocket cost, but asking if there is an alternative that may be submitted as well    Please advise      Pharmacy:   [place] [facility]  [address] [facility] [place] [address]  Phone: [phone] Fax: [phone]

## 2023-01-11 ENCOUNTER — PATIENT MESSAGE (OUTPATIENT)
Dept: FAMILY MEDICINE CLINIC | Facility: CLINIC | Age: 53
End: 2023-01-11

## 2023-01-18 ENCOUNTER — OFFICE VISIT (OUTPATIENT)
Dept: FAMILY MEDICINE CLINIC | Facility: CLINIC | Age: 53
End: 2023-01-18

## 2023-01-18 VITALS
WEIGHT: 151.6 LBS | SYSTOLIC BLOOD PRESSURE: 110 MMHG | RESPIRATION RATE: 16 BRPM | TEMPERATURE: 99 F | HEIGHT: 65 IN | OXYGEN SATURATION: 97 % | BODY MASS INDEX: 25.26 KG/M2 | HEART RATE: 91 BPM | DIASTOLIC BLOOD PRESSURE: 80 MMHG

## 2023-01-18 DIAGNOSIS — J01.00 ACUTE NON-RECURRENT MAXILLARY SINUSITIS: Primary | ICD-10-CM

## 2023-01-18 DIAGNOSIS — Z12.31 ENCOUNTER FOR SCREENING MAMMOGRAM FOR BREAST CANCER: ICD-10-CM

## 2023-01-18 DIAGNOSIS — E89.2 STATUS POST PARATHYROIDECTOMY (HCC): ICD-10-CM

## 2023-01-18 DIAGNOSIS — Z13.820 OSTEOPOROSIS SCREENING: ICD-10-CM

## 2023-01-18 DIAGNOSIS — G37.9 CNS DEMYELINATION (HCC): ICD-10-CM

## 2023-01-18 DIAGNOSIS — E21.3 HYPERPARATHYROIDISM (HCC): ICD-10-CM

## 2023-01-18 DIAGNOSIS — Z13.220 LIPID SCREENING: ICD-10-CM

## 2023-01-18 RX ORDER — AZITHROMYCIN 250 MG/1
TABLET, FILM COATED ORAL
Qty: 6 TABLET | Refills: 0 | Status: SHIPPED | OUTPATIENT
Start: 2023-01-18 | End: 2023-01-22

## 2023-01-18 NOTE — PROGRESS NOTES
Name: Nae Counter      : 1970      MRN: [id number]  Encounter Provider: Adelita Ji MD  Encounter Date: 2023   Encounter department: [facility]     1  Acute non-recurrent maxillary sinusitis  -     azithromycin (ZITHROMAX) 250 mg tablet; Take 2 tablets today then 1 tablet daily x 4 days    2  Encounter for screening mammogram for breast cancer  -     Mammo screening bilateral w 3d & cad; Future; Expected date: 2023    3  Osteoporosis screening  -     DXA bone density spine hip and pelvis; Future; Expected date: 2023    4  Hyperparathyroidism ([facility] [address] )  -     PTH, intact; Future  -     Vitamin D 25 hydroxy; Future    5  Status post parathyroidectomy (HCC)  -     Comprehensive metabolic panel  -     CBC and differential    6  Lipid screening  -     Lipid Panel with Direct LDL reflex; Future    7  CNS demyelination [facility])  Assessment & Plan:  Cleared by neurology   Continue to monitor              Subjective      URI   This is a new problem  The current episode started in the past 7 days  The problem has been waxing and waning  There has been no fever  Associated symptoms include congestion, coughing, rhinorrhea and sinus pain  Pertinent negatives include no abdominal pain, chest pain, diarrhea, dysuria, ear pain, headaches, joint pain, joint swelling, nausea, neck pain, plugged ear sensation, rash, sneezing, sore throat, swollen glands, vomiting or wheezing  Review of Systems   HENT: Positive for congestion, rhinorrhea and sinus pain  Negative for ear pain, sneezing and sore throat  Respiratory: Positive for cough  Negative for wheezing  Cardiovascular: Negative for chest pain  Gastrointestinal: Negative for abdominal pain, diarrhea, nausea and vomiting  Genitourinary: Negative for dysuria  Musculoskeletal: Negative for joint pain and neck pain  Skin: Negative for rash  Neurological: Negative for headaches         Current Outpatient Medications on File Prior to Visit   Medication Sig   • ibuprofen (MOTRIN) 600 mg tablet TAKE ONE TABLET BY MOUTH EVERY 6 HOURS AS NEEDED FOR MILD PAIN   • Multiple Vitamin (MULTIVITAMIN) capsule Take 1 capsule by mouth daily    • rizatriptan (MAXALT-MLT) 10 mg disintegrating tablet DISSOLVE 1 TABLET IN MOUTH ONCE AS NEEDED FOR MIGRAINE FOR UP TO ONE DOSE  MAY REPEAT IN TWO HOURS IF NEEDED   • [DISCONTINUED] ciclopirox (PENLAC) 8 % solution Apply topically daily at bedtime (Patient not taking: Reported on 1/18/2023)       Objective     /80 (BP Location: Left arm, Patient Position: Sitting, Cuff Size: Adult)   Pulse 91   Temp 99 °F (37 2 °C) (Tympanic)   Resp 16   Ht 5' 4 61" (1 641 m)   Wt 68 8 kg (151 lb 9 6 oz)   LMP 09/10/2018   SpO2 97%   BMI 25 53 kg/m²     Physical Exam  Constitutional:       Appearance: Normal appearance  HENT:      Right Ear: There is no impacted cerumen  Left Ear: There is no impacted cerumen  Nose: Congestion and rhinorrhea present  Cardiovascular:      Rate and Rhythm: Normal rate and regular rhythm  Pulses: Normal pulses  Pulmonary:      Effort: Pulmonary effort is normal       Breath sounds: Normal breath sounds  Neurological:      General: No focal deficit present  Mental Status: She is alert and oriented to person, place, and time     Psychiatric:         Mood and Affect: Mood normal          Behavior: Behavior normal        Nate Almanza MD

## 2023-01-26 DIAGNOSIS — G43.019 MIGRAINE WITHOUT AURA, INTRACTABLE: ICD-10-CM

## 2023-01-26 RX ORDER — RIZATRIPTAN BENZOATE 10 MG/1
TABLET, ORALLY DISINTEGRATING ORAL
Qty: 9 TABLET | Refills: 0 | Status: SHIPPED | OUTPATIENT
Start: 2023-01-26

## 2023-06-05 ENCOUNTER — APPOINTMENT (OUTPATIENT)
Dept: LAB | Facility: CLINIC | Age: 53
End: 2023-06-05
Payer: COMMERCIAL

## 2023-06-05 DIAGNOSIS — Z13.220 LIPID SCREENING: ICD-10-CM

## 2023-06-05 DIAGNOSIS — E21.3 HYPERPARATHYROIDISM (HCC): ICD-10-CM

## 2023-06-05 LAB
25(OH)D3 SERPL-MCNC: 32.9 NG/ML (ref 30–100)
ALBUMIN SERPL BCP-MCNC: 3.8 G/DL (ref 3.5–5)
ALP SERPL-CCNC: 58 U/L (ref 46–116)
ALT SERPL W P-5'-P-CCNC: 34 U/L (ref 12–78)
ANION GAP SERPL CALCULATED.3IONS-SCNC: 3 MMOL/L (ref 4–13)
AST SERPL W P-5'-P-CCNC: 18 U/L (ref 5–45)
BASOPHILS # BLD AUTO: 0.02 THOUSANDS/ÂΜL (ref 0–0.1)
BASOPHILS NFR BLD AUTO: 0 % (ref 0–1)
BILIRUB SERPL-MCNC: 0.37 MG/DL (ref 0.2–1)
BUN SERPL-MCNC: 14 MG/DL (ref 5–25)
CALCIUM SERPL-MCNC: 8.7 MG/DL (ref 8.3–10.1)
CHLORIDE SERPL-SCNC: 106 MMOL/L (ref 96–108)
CHOLEST SERPL-MCNC: 188 MG/DL
CO2 SERPL-SCNC: 27 MMOL/L (ref 21–32)
CREAT SERPL-MCNC: 0.68 MG/DL (ref 0.6–1.3)
EOSINOPHIL # BLD AUTO: 0.13 THOUSAND/ÂΜL (ref 0–0.61)
EOSINOPHIL NFR BLD AUTO: 3 % (ref 0–6)
ERYTHROCYTE [DISTWIDTH] IN BLOOD BY AUTOMATED COUNT: 13.5 % (ref 11.6–15.1)
GFR SERPL CREATININE-BSD FRML MDRD: 99 ML/MIN/1.73SQ M
GLUCOSE P FAST SERPL-MCNC: 104 MG/DL (ref 65–99)
HCT VFR BLD AUTO: 42.4 % (ref 34.8–46.1)
HDLC SERPL-MCNC: 63 MG/DL
HGB BLD-MCNC: 13.5 G/DL (ref 11.5–15.4)
IMM GRANULOCYTES # BLD AUTO: 0.01 THOUSAND/UL (ref 0–0.2)
IMM GRANULOCYTES NFR BLD AUTO: 0 % (ref 0–2)
LDLC SERPL CALC-MCNC: 110 MG/DL (ref 0–100)
LYMPHOCYTES # BLD AUTO: 2.01 THOUSANDS/ÂΜL (ref 0.6–4.47)
LYMPHOCYTES NFR BLD AUTO: 42 % (ref 14–44)
MCH RBC QN AUTO: 28.4 PG (ref 26.8–34.3)
MCHC RBC AUTO-ENTMCNC: 31.8 G/DL (ref 31.4–37.4)
MCV RBC AUTO: 89 FL (ref 82–98)
MONOCYTES # BLD AUTO: 0.3 THOUSAND/ÂΜL (ref 0.17–1.22)
MONOCYTES NFR BLD AUTO: 6 % (ref 4–12)
NEUTROPHILS # BLD AUTO: 2.36 THOUSANDS/ÂΜL (ref 1.85–7.62)
NEUTS SEG NFR BLD AUTO: 49 % (ref 43–75)
NRBC BLD AUTO-RTO: 0 /100 WBCS
PLATELET # BLD AUTO: 226 THOUSANDS/UL (ref 149–390)
PMV BLD AUTO: 10.3 FL (ref 8.9–12.7)
POTASSIUM SERPL-SCNC: 4 MMOL/L (ref 3.5–5.3)
PROT SERPL-MCNC: 7.4 G/DL (ref 6.4–8.4)
PTH-INTACT SERPL-MCNC: 36.6 PG/ML (ref 12–88)
RBC # BLD AUTO: 4.76 MILLION/UL (ref 3.81–5.12)
SODIUM SERPL-SCNC: 136 MMOL/L (ref 135–147)
TRIGL SERPL-MCNC: 75 MG/DL
WBC # BLD AUTO: 4.83 THOUSAND/UL (ref 4.31–10.16)

## 2023-06-05 PROCEDURE — 83970 ASSAY OF PARATHORMONE: CPT

## 2023-06-05 PROCEDURE — 80061 LIPID PANEL: CPT

## 2023-06-05 PROCEDURE — 82306 VITAMIN D 25 HYDROXY: CPT

## 2023-08-02 DIAGNOSIS — G43.019 MIGRAINE WITHOUT AURA, INTRACTABLE: ICD-10-CM

## 2023-08-02 RX ORDER — RIZATRIPTAN BENZOATE 10 MG/1
TABLET, ORALLY DISINTEGRATING ORAL
Qty: 9 TABLET | Refills: 0 | Status: SHIPPED | OUTPATIENT
Start: 2023-08-02

## 2023-08-11 ENCOUNTER — VBI (OUTPATIENT)
Dept: ADMINISTRATIVE | Facility: OTHER | Age: 53
End: 2023-08-11

## 2023-09-20 ENCOUNTER — TELEPHONE (OUTPATIENT)
Age: 53
End: 2023-09-20

## 2023-09-20 NOTE — TELEPHONE ENCOUNTER
----- Message from Katrin Bach sent at 9/19/2023  9:08 AM EDT -----  Regarding: MELINDA  Contact: [phone]  Dr Vik Snowden Morning! I have been gaining weight eating alot. I am always wanting to eat and not feel full. I am not sure at my age if my hormones are messing with me. Do you have any suggestions.

## 2023-11-14 ENCOUNTER — OFFICE VISIT (OUTPATIENT)
Dept: FAMILY MEDICINE CLINIC | Facility: CLINIC | Age: 53
End: 2023-11-14
Payer: COMMERCIAL

## 2023-11-14 VITALS
HEIGHT: 65 IN | WEIGHT: 157.6 LBS | OXYGEN SATURATION: 98 % | RESPIRATION RATE: 17 BRPM | SYSTOLIC BLOOD PRESSURE: 130 MMHG | TEMPERATURE: 98.8 F | BODY MASS INDEX: 26.26 KG/M2 | HEART RATE: 94 BPM | DIASTOLIC BLOOD PRESSURE: 84 MMHG

## 2023-11-14 DIAGNOSIS — Z11.59 ENCOUNTER FOR HEPATITIS C SCREENING TEST FOR LOW RISK PATIENT: ICD-10-CM

## 2023-11-14 DIAGNOSIS — E89.2 STATUS POST PARATHYROIDECTOMY (HCC): ICD-10-CM

## 2023-11-14 DIAGNOSIS — Z12.31 VISIT FOR SCREENING MAMMOGRAM: ICD-10-CM

## 2023-11-14 DIAGNOSIS — Z00.00 ANNUAL PHYSICAL EXAM: Primary | ICD-10-CM

## 2023-11-14 DIAGNOSIS — G43.019 MIGRAINE WITHOUT AURA, INTRACTABLE: ICD-10-CM

## 2023-11-14 DIAGNOSIS — F51.01 PRIMARY INSOMNIA: ICD-10-CM

## 2023-11-14 DIAGNOSIS — R63.5 WEIGHT GAIN: ICD-10-CM

## 2023-11-14 DIAGNOSIS — Z11.4 ENCOUNTER FOR SCREENING FOR HIV: ICD-10-CM

## 2023-11-14 DIAGNOSIS — M50.120 CERVICAL DISC DISORDER WITH RADICULOPATHY OF MID-CERVICAL REGION: ICD-10-CM

## 2023-11-14 DIAGNOSIS — N95.9 MENOPAUSAL DISORDER: ICD-10-CM

## 2023-11-14 PROBLEM — E21.3 HYPERPARATHYROIDISM (HCC): Status: RESOLVED | Noted: 2021-07-12 | Resolved: 2023-11-14

## 2023-11-14 PROCEDURE — 99396 PREV VISIT EST AGE 40-64: CPT | Performed by: FAMILY MEDICINE

## 2023-11-14 NOTE — PROGRESS NOTES
[address] [facility]    NAME: Nieves Ya  AGE: 46 y.o. SEX: female  : 1970     DATE: 2023     Assessment and Plan:     Problem List Items Addressed This Visit        Cardiovascular and Mediastinum    Migraine without aura, intractable     Hasn't taken anything for this yet  Continue to monitor             Nervous and Auditory    Cervical disc disorder with radiculopathy of mid-cervical region     Wants to do PT again          Relevant Orders    Ambulatory Referral to Physical Therapy       Other    Primary insomnia     Tylenol PM at bedtime as needed         Status post parathyroidectomy [facility])   Other Visit Diagnoses     Annual physical exam    -  Primary    Weight gain        Relevant Orders    Comprehensive metabolic panel    TSH, 3rd generation with Free T4 reflex    Hemoglobin A1C    Ambulatory Referral to Obstetrics / Gynecology    Encounter for hepatitis C screening test for low risk patient        Relevant Orders    Hepatitis C antibody    Encounter for screening for HIV        Relevant Orders    HIV 1/2 AG/AB w Reflex [facility] for 2 yr old and above    Visit for screening mammogram        Relevant Orders    Mammo screening bilateral w 3d & cad    Menopausal disorder        Relevant Orders    Ambulatory Referral to Obstetrics / Gynecology          Immunizations and preventive care screenings were discussed with patient today. Appropriate education was printed on patient's after visit summary. Counseling:  Alcohol/drug use: discussed moderation in alcohol intake, the recommendations for healthy alcohol use, and avoidance of illicit drug use. Dental Health: discussed importance of regular tooth brushing, flossing, and dental visits. Injury prevention: discussed safety/seat belts, safety helmets, smoke detectors, carbon dioxide detectors, and smoking near bedding or upholstery.   Sexual health: discussed sexually transmitted diseases, partner selection, use of condoms, avoidance of unintended pregnancy, and contraceptive alternatives. Exercise: the importance of regular exercise/physical activity was discussed. Recommend exercise 3-5 times per week for at least 30 minutes. BMI Counseling: Body mass index is 26.51 kg/m². The BMI is above normal. Nutrition recommendations include decreasing portion sizes and encouraging healthy choices of fruits and vegetables. Exercise recommendations include moderate physical activity 150 minutes/week. No pharmacotherapy was ordered. Rationale for BMI follow-up plan is due to patient being overweight or obese. Depression Screening and Follow-up Plan: Patient was screened for depression during today's encounter. They screened negative with a PHQ-2 score of 0. No follow-ups on file. Chief Complaint:     Chief Complaint   Patient presents with   • Physical Exam     Patient being seen for Physical Exam      History of Present Illness:     Adult Annual Physical   Patient here for a comprehensive physical exam. The patient reports hard time losing weight, thinking about food all the time. 29 25, 15year old sons  Ongoing issues with Right shoulder and neck pain     Diet and Physical Activity  Diet/Nutrition: limited fruits/vegetables. Exercise: walking. Depression Screening  PHQ-2/9 Depression Screening    Little interest or pleasure in doing things: 0 - not at all  Feeling down, depressed, or hopeless: 0 - not at all  PHQ-2 Score: 0  PHQ-2 Interpretation: Negative depression screen       General Health  Sleep: sleeps well. Hearing: normal - bilateral.  Vision: goes for regular eye exams. Dental: regular dental visits and brushes teeth twice daily. /GYN Health  Follows with gynecology? no   Patient is: postmenopausal  Last menstrual period: hysterectomy 4-5 years ago   Contraceptive method: menopause.     Advanced Care Planning  Do you have an advanced directive? no  Do you have a durable medical power of ? no     Review of Systems:     Review of Systems   Constitutional: Negative. HENT: Negative. Eyes: Negative. Respiratory: Negative. Cardiovascular: Negative. Gastrointestinal: Negative. Endocrine: Negative. Genitourinary: Negative. Musculoskeletal: Negative. Skin: Negative. Allergic/Immunologic: Negative. Neurological: Negative. Hematological: Negative. Psychiatric/Behavioral: Negative. 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   • Complete uterovaginal prolapse    • Constipation    • Headache(784.0)    • HPV (human papilloma virus) infection    • Hyperparathyroidism ([address]) 2021   • Migraines    • PONV (postoperative nausea and vomiting)    • Seasonal allergies    • Tinnitus    • Varicella    • Wears contact lenses       Past Surgical History:     Past Surgical History:   Procedure Laterality Date   • CATARACT EXTRACTION Bilateral    •  SECTION     • COLONOSCOPY     • CYSTOSCOPY N/A 2018    Procedure: CYSTOSCOPY;  Surgeon: Tiajuana Moritz, MD;  [facility];  Service: Gynecology   • EYE SURGERY     • HYSTERECTOMY  2018   • WA COLPOPEXY VAGINAL EXTRAPERITONEAL APPROACH Right 2018    Procedure: FIXATION LIGAMENT SACROSPINOUS, RIGHT;  Surgeon: Tiajuana Moritz, MD;  [facility];  Service: Gynecology   • WA PARATHYROIDECTOMY/EXPLORATION PARATHYROIDS N/A 10/14/2021    Procedure: PARATHYROIDECTOMY, MINIMALLY INVASIVE, LEFT INFERIOR WITH intraoperative PTH monitoring;  Surgeon: Sera Feliciano MD;  [facility];  Service: Surgical Oncology   • WA VAGINAL HYSTERECTOMY UTERUS 250 GM/< N/A 2018    Procedure: TOTAL VAG. HYSTERECTOMY;  Surgeon: Tiajuana Moritz, MD;  [facility];  Service: Gynecology   • WISDOM TOOTH EXTRACTION        Social History:     Social History     Socioeconomic History   • Marital status: Single     Spouse name: None   • Number of children: 3   • Years of education: None   • Highest education level: None   Occupational History   • None   Tobacco Use   • Smoking status: Never   • Smokeless tobacco: Never   Vaping Use   • Vaping Use: None   Substance and Sexual Activity   • Alcohol use: No   • Drug use: No   • Sexual activity: Not Currently     Comment: NA   Other Topics Concern   • None   Social History Narrative    Caffeine use    Uses safety equipment seatbelts     Social Determinants of Health     Financial Resource Strain: Not on file   Food Insecurity: Not on file   Transportation Needs: Not on file   Physical Activity: Not on file   Stress: Not on file   Social Connections: Not on file   Intimate Partner Violence: Not on file   Housing Stability: Not on file      Family History:     Family History   Problem Relation Age of Onset   • Kidney failure Father         acute per allscripts   • Diabetes type II Father    • No Known Problems Maternal Grandmother    • No Known Problems Maternal Grandfather    • Diabetes Paternal Grandmother         Fathers mom   • Colon cancer Paternal Grandfather 58        fathers dad   • Cancer Paternal Grandfather         Fathers dad   • No Known Problems Brother    • No Known Problems Son    • No Known Problems Son    • No Known Problems Son    • No Known Problems Paternal Aunt       Current Medications:     Current Outpatient Medications   Medication Sig Dispense Refill   • Multiple Vitamin (MULTIVITAMIN) capsule Take 1 capsule by mouth daily      • rizatriptan (MAXALT-MLT) 10 mg disintegrating tablet DISSOLVE 1 TABLET BY MOUTH AS NEEDED FOR MIGRAINE FOR UP TO 1 DOSE, MAY REPEAT IN 2 HOURS IF NEEDED 9 tablet 0     No current facility-administered medications for this visit. Allergies:      Allergies   Allergen Reactions   • Latex Rash   • Sulfamethoxazole-Trimethoprim Rash      Physical Exam:     /84 (BP Location: Left arm, Patient Position: Sitting, Cuff Size: Standard)   Pulse 94   Temp 98.8 °F (37.1 °C) (Tympanic)   Resp 17   Ht 5' 4.65" (1.642 m)   Wt 71.5 kg (157 lb 9.6 oz)   LMP 09/10/2018   SpO2 98%   BMI 26.51 kg/m²     Physical Exam  Vitals and nursing note reviewed. Constitutional:       Appearance: Normal appearance. She is well-developed. HENT:      Head: Normocephalic and atraumatic. Right Ear: Tympanic membrane normal.      Left Ear: Tympanic membrane normal.      Nose: Nose normal.      Mouth/Throat:      Mouth: Mucous membranes are moist.   Eyes:      Pupils: Pupils are equal, round, and reactive to light. Cardiovascular:      Rate and Rhythm: Normal rate and regular rhythm. Pulses: Normal pulses. Heart sounds: Normal heart sounds. Pulmonary:      Effort: Pulmonary effort is normal. No respiratory distress. Breath sounds: Normal breath sounds. No wheezing. Abdominal:      General: Bowel sounds are normal.      Palpations: Abdomen is soft. Musculoskeletal:         General: No deformity. Normal range of motion. Cervical back: Normal range of motion and neck supple. Skin:     General: Skin is warm. Neurological:      General: No focal deficit present. Mental Status: She is alert and oriented to person, place, and time.    Psychiatric:         Mood and Affect: Mood normal.         Behavior: Behavior normal.          Jocelynn Kwon MD  [address] [place] [address]

## 2024-01-01 DIAGNOSIS — G43.019 MIGRAINE WITHOUT AURA, INTRACTABLE: ICD-10-CM

## 2024-01-02 RX ORDER — RIZATRIPTAN BENZOATE 10 MG/1
TABLET, ORALLY DISINTEGRATING ORAL
Qty: 9 TABLET | Refills: 0 | Status: SHIPPED | OUTPATIENT
Start: 2024-01-02

## 2024-02-16 ENCOUNTER — HOSPITAL ENCOUNTER (OUTPATIENT)
Dept: MAMMOGRAPHY | Facility: CLINIC | Age: 54
Discharge: HOME | End: 2024-02-16
Payer: COMMERCIAL

## 2024-02-16 VITALS — HEIGHT: 64 IN | BODY MASS INDEX: 26.8 KG/M2 | WEIGHT: 157 LBS

## 2024-02-16 DIAGNOSIS — Z12.31 VISIT FOR SCREENING MAMMOGRAM: ICD-10-CM

## 2024-02-16 PROCEDURE — 77067 SCR MAMMO BI INCL CAD: CPT

## 2024-02-16 PROCEDURE — 77063 BREAST TOMOSYNTHESIS BI: CPT

## 2024-04-15 DIAGNOSIS — G43.019 MIGRAINE WITHOUT AURA, INTRACTABLE: ICD-10-CM

## 2024-04-15 RX ORDER — RIZATRIPTAN BENZOATE 10 MG/1
TABLET, ORALLY DISINTEGRATING ORAL
Qty: 9 TABLET | Refills: 5 | Status: SHIPPED | OUTPATIENT
Start: 2024-04-15

## 2024-06-13 ENCOUNTER — VBI (OUTPATIENT)
Dept: ADMINISTRATIVE | Facility: OTHER | Age: 54
End: 2024-06-13

## 2024-06-13 NOTE — TELEPHONE ENCOUNTER
06/13/24 1:48 PM     Chart reviewed for Pap Smear (HPV) aka Cervical Cancer Screening ; nothing is submitted to the patient's insurance at this time.     Halima Salazar   [facility]

## 2024-07-16 ENCOUNTER — PATIENT MESSAGE (OUTPATIENT)
Dept: FAMILY MEDICINE CLINIC | Facility: CLINIC | Age: 54
End: 2024-07-16

## 2024-08-28 ENCOUNTER — APPOINTMENT (OUTPATIENT)
Dept: LAB | Facility: CLINIC | Age: 54
End: 2024-08-28
Payer: COMMERCIAL

## 2024-08-28 DIAGNOSIS — R63.5 WEIGHT GAIN: ICD-10-CM

## 2024-08-28 DIAGNOSIS — Z11.4 ENCOUNTER FOR SCREENING FOR HIV: ICD-10-CM

## 2024-08-28 DIAGNOSIS — Z11.59 ENCOUNTER FOR HEPATITIS C SCREENING TEST FOR LOW RISK PATIENT: ICD-10-CM

## 2024-08-28 LAB
ALBUMIN SERPL BCG-MCNC: 4.4 G/DL (ref 3.5–5)
ALP SERPL-CCNC: 71 U/L (ref 34–104)
ALT SERPL W P-5'-P-CCNC: 33 U/L (ref 7–52)
ANION GAP SERPL CALCULATED.3IONS-SCNC: 10 MMOL/L (ref 4–13)
AST SERPL W P-5'-P-CCNC: 21 U/L (ref 13–39)
BILIRUB SERPL-MCNC: 0.4 MG/DL (ref 0.2–1)
BUN SERPL-MCNC: 14 MG/DL (ref 5–25)
CALCIUM SERPL-MCNC: 9.2 MG/DL (ref 8.4–10.2)
CHLORIDE SERPL-SCNC: 102 MMOL/L (ref 96–108)
CO2 SERPL-SCNC: 28 MMOL/L (ref 21–32)
CREAT SERPL-MCNC: 0.64 MG/DL (ref 0.6–1.3)
EST. AVERAGE GLUCOSE BLD GHB EST-MCNC: 123 MG/DL
GFR SERPL CREATININE-BSD FRML MDRD: 100 ML/MIN/1.73SQ M
GLUCOSE P FAST SERPL-MCNC: 100 MG/DL (ref 65–99)
HBA1C MFR BLD: 5.9 %
HCV AB SER QL: NORMAL
HIV 1+2 AB+HIV1 P24 AG SERPL QL IA: NORMAL
HIV 2 AB SERPL QL IA: NORMAL
HIV1 AB SERPL QL IA: NORMAL
HIV1 P24 AG SERPL QL IA: NORMAL
POTASSIUM SERPL-SCNC: 4.5 MMOL/L (ref 3.5–5.3)
PROT SERPL-MCNC: 7.1 G/DL (ref 6.4–8.4)
SODIUM SERPL-SCNC: 140 MMOL/L (ref 135–147)
TSH SERPL DL<=0.05 MIU/L-ACNC: 2.62 UIU/ML (ref 0.45–4.5)

## 2024-08-28 PROCEDURE — 84443 ASSAY THYROID STIM HORMONE: CPT

## 2024-08-28 PROCEDURE — 86803 HEPATITIS C AB TEST: CPT

## 2024-08-28 PROCEDURE — 36415 COLL VENOUS BLD VENIPUNCTURE: CPT

## 2024-08-28 PROCEDURE — 87389 HIV-1 AG W/HIV-1&-2 AB AG IA: CPT

## 2024-09-12 ENCOUNTER — VBI (OUTPATIENT)
Dept: ADMINISTRATIVE | Facility: OTHER | Age: 54
End: 2024-09-12

## 2024-09-12 NOTE — TELEPHONE ENCOUNTER
09/12/24 11:35 AM     Chart reviewed for Pap Smear (HPV) aka Cervical Cancer Screening ; nothing is submitted to the patient's insurance at this time.     Halima Salazar MA   [facility]

## 2024-11-11 DIAGNOSIS — G43.019 MIGRAINE WITHOUT AURA, INTRACTABLE: ICD-10-CM

## 2024-11-12 RX ORDER — RIZATRIPTAN BENZOATE 10 MG/1
TABLET, ORALLY DISINTEGRATING ORAL
Qty: 9 TABLET | Refills: 0 | Status: SHIPPED | OUTPATIENT
Start: 2024-11-12

## 2024-11-15 ENCOUNTER — OFFICE VISIT (OUTPATIENT)
Dept: FAMILY MEDICINE CLINIC | Facility: CLINIC | Age: 54
End: 2024-11-15

## 2024-11-15 VITALS
HEART RATE: 79 BPM | SYSTOLIC BLOOD PRESSURE: 120 MMHG | HEIGHT: 65 IN | RESPIRATION RATE: 17 BRPM | DIASTOLIC BLOOD PRESSURE: 80 MMHG | WEIGHT: 160 LBS | BODY MASS INDEX: 26.66 KG/M2 | TEMPERATURE: 97.9 F | OXYGEN SATURATION: 98 %

## 2024-11-15 DIAGNOSIS — M54.2 CHRONIC NECK AND BACK PAIN: ICD-10-CM

## 2024-11-15 DIAGNOSIS — Z00.00 ANNUAL PHYSICAL EXAM: Primary | ICD-10-CM

## 2024-11-15 DIAGNOSIS — G89.29 CHRONIC NECK AND BACK PAIN: ICD-10-CM

## 2024-11-15 DIAGNOSIS — G43.019 MIGRAINE WITHOUT AURA, INTRACTABLE: ICD-10-CM

## 2024-11-15 DIAGNOSIS — M54.9 CHRONIC NECK AND BACK PAIN: ICD-10-CM

## 2024-11-15 RX ORDER — METHYLPREDNISOLONE 4 MG/1
TABLET ORAL
Qty: 21 EACH | Refills: 0 | Status: SHIPPED | OUTPATIENT
Start: 2024-11-15

## 2024-11-15 NOTE — PROGRESS NOTES
Adult Annual Physical  Name: Lynda Bach      : 1970      MRN: [id number]  Encounter Provider: Leilani De La O MD  Encounter Date: 11/15/2024   Encounter department: [facility]    Assessment & Plan  Annual physical exam    Orders:    Lipid panel    Hemoglobin A1C    Comprehensive metabolic panel    CBC and differential    Migraine without aura, intractable  Headaches 3-4 times per month   To start magnesium oxide 400 mg at bedtime and b2 400 mg daily          Chronic neck and back pain  Referral to Chiropractor  Use medro dos pack as needed   Orders:    Ambulatory Referral to Chiropractic; Future    methylPREDNISolone 4 MG tablet therapy pack; Use as directed on package    Immunizations and preventive care screenings were discussed with patient today. Appropriate education was printed on patient's after visit summary.    Counseling:  Alcohol/drug use: discussed moderation in alcohol intake, the recommendations for healthy alcohol use, and avoidance of illicit drug use.  Dental Health: discussed importance of regular tooth brushing, flossing, and dental visits.  Injury prevention: discussed safety/seat belts, safety helmets, smoke detectors, carbon monoxide detectors, and smoking near bedding or upholstery.  Sexual health: discussed sexually transmitted diseases, partner selection, use of condoms, avoidance of unintended pregnancy, and contraceptive alternatives.  Exercise: the importance of regular exercise/physical activity was discussed. Recommend exercise 3-5 times per week for at least 30 minutes.          History of Present Illness     Here for physical exam   Continued to have headaches 3-4 times a week   Continued to have neck and back pain     Adult Annual Physical:  Patient presents for annual physical.     Diet and Physical Activity:  - Diet/Nutrition: consuming 3-5 servings of fruits/vegetables daily.  - Exercise: walking.    Depression Screening:  - PHQ-2 Score:  0    General Health:  - Sleep: sleeps well and 7-8 hours of sleep on average.  - Hearing: normal hearing bilateral ears.  - Vision: goes for regular eye exams.  - Dental: regular dental visits and brushes teeth twice daily.    Review of Systems   Constitutional: Negative.  Negative for chills and fever.   HENT: Negative.  Negative for ear pain and sore throat.    Eyes: Negative.  Negative for pain and visual disturbance.   Respiratory: Negative.  Negative for cough and shortness of breath.    Cardiovascular: Negative.  Negative for chest pain and palpitations.   Gastrointestinal: Negative.  Negative for abdominal pain and vomiting.   Endocrine: Negative.    Genitourinary: Negative.  Negative for dysuria and hematuria.   Musculoskeletal: Negative.  Negative for arthralgias and back pain.   Skin:  Negative for color change and rash.   Allergic/Immunologic: Negative.    Neurological:  Positive for headaches. Negative for seizures and syncope.   Hematological: Negative.    Psychiatric/Behavioral: Negative.     All other systems reviewed and are negative.    Medical History Reviewed by provider this encounter:  Tobacco  Allergies  Meds  Problems  Med Hx  Surg Hx  Fam Hx     .  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    Complete uterovaginal prolapse     Constipation     Headache(784.0)     HPV (human papilloma virus) infection     Hyperparathyroidism (HCC) 2021    Migraines     PONV (postoperative nausea and vomiting)     Seasonal allergies     Tinnitus     Varicella     Wears contact lenses      Past Surgical History:   Procedure Laterality Date    CATARACT EXTRACTION Bilateral      SECTION      COLONOSCOPY      CYSTOSCOPY N/A 2018    Procedure: CYSTOSCOPY;  Surgeon: Leo Leyva MD;  Location: [facility] OR;  Service: Gynecology    EYE SURGERY      HYSTERECTOMY      WA COLPOPEXY VAGINAL  EXTRAPERITONEAL APPROACH Right 9/25/2018    Procedure: FIXATION LIGAMENT SACROSPINOUS, RIGHT;  Surgeon: Leo eLyva MD;  [facility];  Service: Gynecology    MS PARATHYROIDECTOMY/EXPLORATION PARATHYROIDS N/A 10/14/2021    Procedure: PARATHYROIDECTOMY, MINIMALLY INVASIVE, LEFT INFERIOR WITH intraoperative PTH monitoring;  Surgeon: Chepe Salguero MD;  [facility];  Service: Surgical Oncology    MS VAGINAL HYSTERECTOMY UTERUS 250 GM/< N/A 9/25/2018    Procedure: TOTAL VAG.HYSTERECTOMY;  Surgeon: Leo Leyva MD;  [facility];  Service: Gynecology    WISDOM TOOTH EXTRACTION       Family History   Problem Relation Age of Onset    Kidney failure Father         acute per allscripts    Diabetes type II Father     No Known Problems Maternal Grandmother     No Known Problems Maternal Grandfather     Diabetes Paternal Grandmother         Fathers mom    Colon cancer Paternal Grandfather 62        fathers dad    Cancer Paternal Grandfather         Fathers dad    No Known Problems Brother     No Known Problems Son     No Known Problems Son     No Known Problems Son     No Known Problems Paternal Aunt       reports that she has never smoked. She has never been exposed to tobacco smoke. She has never used smokeless tobacco. She reports that she does not drink alcohol and does not use drugs.  Current Outpatient Medications on File Prior to Visit   Medication Sig Dispense Refill    Multiple Vitamin (MULTIVITAMIN) capsule Take 1 capsule by mouth daily       rizatriptan (MAXALT-MLT) 10 mg disintegrating tablet DISSOLVE 1 TABLET IN MOUTH ONCE AS NEEDED FOR MIGRAINE FOR UP TO ONE DOSE. MAY REPEAT IN TWO HOURS IF NEEDED 9 tablet 0     No current facility-administered medications on file prior to visit.     Allergies   Allergen Reactions    Latex Rash    Sulfamethoxazole-Trimethoprim Rash      Current Outpatient Medications on File Prior to Visit   Medication Sig Dispense Refill    Multiple Vitamin  "(MULTIVITAMIN) capsule Take 1 capsule by mouth daily       rizatriptan (MAXALT-MLT) 10 mg disintegrating tablet DISSOLVE 1 TABLET IN MOUTH ONCE AS NEEDED FOR MIGRAINE FOR UP TO ONE DOSE. MAY REPEAT IN TWO HOURS IF NEEDED 9 tablet 0     No current facility-administered medications on file prior to visit.      Social History     Tobacco Use    Smoking status: Never     Passive exposure: Never    Smokeless tobacco: Never   Vaping Use    Vaping status: Never Used   Substance and Sexual Activity    Alcohol use: No    Drug use: No    Sexual activity: Not Currently     Comment: NA       Objective   /80 (BP Location: Left arm, Patient Position: Sitting, Cuff Size: Standard)   Pulse 79   Temp 97.9 °F (36.6 °C) (Tympanic)   Resp 17   Ht 5' 4.61\" (1.641 m)   Wt 72.6 kg (160 lb)   LMP 09/10/2018   SpO2 98%   BMI 26.95 kg/m²     Physical Exam  Vitals and nursing note reviewed.   Constitutional:       Appearance: Normal appearance. She is well-developed.   HENT:      Head: Normocephalic and atraumatic.      Right Ear: Tympanic membrane normal.      Left Ear: Tympanic membrane normal.      Nose: Nose normal.      Mouth/Throat:      Mouth: Mucous membranes are moist.   Eyes:      Pupils: Pupils are equal, round, and reactive to light.   Neck:      Thyroid: No thyromegaly.   Cardiovascular:      Rate and Rhythm: Normal rate and regular rhythm.      Pulses: Normal pulses.      Heart sounds: Normal heart sounds. No murmur heard.  Pulmonary:      Effort: Pulmonary effort is normal.      Breath sounds: Normal breath sounds.   Abdominal:      General: Bowel sounds are normal.      Palpations: Abdomen is soft.   Musculoskeletal:         General: No deformity. Normal range of motion.      Cervical back: Normal range of motion and neck supple.   Skin:     General: Skin is warm.      Capillary Refill: Capillary refill takes less than 2 seconds.      Findings: No erythema or rash.   Neurological:      General: No focal deficit " present.      Mental Status: She is alert and oriented to person, place, and time.      Deep Tendon Reflexes: Reflexes are normal and symmetric.   Psychiatric:         Mood and Affect: Mood normal.         Behavior: Behavior normal.

## 2024-11-15 NOTE — ASSESSMENT & PLAN NOTE
Headaches 3-4 times per month   To start magnesium oxide 400 mg at bedtime and b2 400 mg daily

## 2024-11-15 NOTE — ASSESSMENT & PLAN NOTE
Referral to Chiropractor  Use medro dos pack as needed   Orders:    Ambulatory Referral to Chiropractic; Future    methylPREDNISolone 4 MG tablet therapy pack; Use as directed on package

## 2024-12-05 NOTE — TELEPHONE ENCOUNTER
I refilled it PHYSICAL THERAPY EVALUATION  Type of Visit: Evaluation        Fall Risk Screen:  Fall screen completed by: PT  Have you fallen 2 or more times in the past year?: No  Have you fallen and had an injury in the past year?: No  Is patient a fall risk?: No    Subjective         Presenting condition or subjective complaint: (Patient-Rptd) lumbar, right hip,right knee  Date of onset:      Relevant medical history: (Patient-Rptd) Migraines or headaches; Osteoarthritis; Overweight; Pain at night or rest   Dates & types of surgery:      Prior diagnostic imaging/testing results: (Patient-Rptd) X-ray     Prior therapy history for the same diagnosis, illness or injury: (Patient-Rptd) No      Living Environment  Social support: (Patient-Rptd) With family members   Type of home: (Patient-Rptd) House; 2-story   Stairs to enter the home: (Patient-Rptd) Yes       Ramp: (Patient-Rptd) No   Stairs inside the home: (Patient-Rptd) No       Help at home: (Patient-Rptd) None  Equipment owned:       Employment:      Hobbies/Interests:      Patient goals for therapy: (Patient-Rptd) sleep without psin,drive car without pain    Pain assessment: pt reports back pain, sharp shooting pain to the R knee and R groin. Pt reports has been seeing a chiropractor 1x per week for the last month for pain relief. Pt reports also has a lot of Left sided low back tightness per the doctor. Pt reports had a flare up of pain this past Saturday when bending and twisting with the laundry basket in an awkward space, pt reports was in pain for days but it has since calmed down some. Pt reports this back pain has been on going for 10 years, worse this past summer. Feels better with walking, worse with sitting.      Objective      PELVIC EVALUATION  ADDITIONAL HISTORY:  Sex assigned at birth: (Patient-Rptd) Female  Gender identity: (Patient-Rptd) Female    Pronouns:        Bladder History:  Feels bladder filling: (Patient-Rptd) No  Triggers for feeling of inability to  wait to go to the bathroom: (Patient-Rptd) No    How long can you wait to urinate: (Patient-Rptd) 1 hour  Gets up at night to urinate: (Patient-Rptd) No    Can stop the flow of urine when urinating: (Patient-Rptd) Sometimes  Volume of urine usually released: (Patient-Rptd) Medium   Other issues: (Patient-Rptd) Trouble emptying bladder completely; Dribbling after urinating  Number of bladder infections in last 12 months:    Fluid intake per day:        Medications taken for bladder: (Patient-Rptd) No     Activities causing urine leak: (Patient-Rptd) Cough; Sneeze; Jump; Run    Amount of urine typically leaked:    Pads used to help with leaking: (Patient-Rptd) Yes        Bowel History:  Frequency of bowel movement:    Consistency of stool: (Patient-Rptd) Soft-formed    Ignores the urge to defecate: (Patient-Rptd) No  Other bowel issues:    Length of time spent trying to have a bowel movement:      Sexual Function History:  Sexual orientation: (Patient-Rptd) Straight    Sexually active:    Lubrication used:      Pelvic pain:      Pain or difficulty with orgasms/erection/ejaculation: (Patient-Rptd) No    State of menopause:    Hormone medications:        Are you currently pregnant: (Patient-Rptd) No  If you have delivered before, did you have any of these issues during delivery: (Patient-Rptd) Tearing  Have you been diagnosed with pelvic prolapse or abdominal separation: (Patient-Rptd) No  Do you get regular exercise: (Patient-Rptd) Yes  Have you tried pelvic floor strengthening exercises for 4 weeks: (Patient-Rptd) No  Do you have any history of trauma that is relevant to your care that you d like to share: (Patient-Rptd) No      Discussed reason for referral regarding pelvic health needs and external/internal pelvic floor muscle examination with patient/guardian.  Opportunity provided to ask questions and verbal consent for assessment and intervention was given.    PAIN: see above in subjective section.  POSTURE:  Sitting Posture: Lordosis decreased  LUMBAR SCREEN:  nil loss flexion, mod to severe loss extension, nil loss side glide B.  Gait: normal gait  Special Tests: + slump R, - slump L  Palpation: hypertonic muscles L erector spinae, normal R sided  Segmental mobility: hypomobility in lumbar spine with replication of symptoms with PA mobes to L1-L5; reduced pain and better with improved ROM after mobilizations to the lumbar spine.      Deferred pelvic floor exam secondary to acute exacerbation of radicular symptoms from lumbar spine, strengthening contraindicated due to acute pain.    Assessment & Plan   CLINICAL IMPRESSIONS  Medical Diagnosis:      Treatment Diagnosis:     Impression/Assessment: Patient is a 38 year old female with Lumbar radiculopathy, stress incontinence complaints.  The following significant findings have been identified: Pain, Decreased ROM/flexibility, Decreased joint mobility, Impaired muscle performance, and Decreased activity tolerance. These impairments interfere with their ability to perform self care tasks, recreational activities, household chores, household mobility, and community mobility as compared to previous level of function.     Clinical Decision Making (Complexity):  Clinical Presentation: Stable/Uncomplicated  Clinical Presentation Rationale: based on medical and personal factors listed in PT evaluation  Clinical Decision Making (Complexity): Low complexity    PLAN OF CARE  Treatment Interventions:  Modalities:  cupping  Interventions: Manual Therapy, Neuromuscular Re-education, Therapeutic Activity, Therapeutic Exercise    Long Term Goals            Frequency of Treatment:    Duration of Treatment:      Recommended Referrals to Other Professionals:   Education Assessment:        Risks and benefits of evaluation/treatment have been explained.   Patient/Family/caregiver agrees with Plan of Care.     Evaluation Time:             Signing Clinician: Reanna Boggs, PT

## 2024-12-16 ENCOUNTER — VBI (OUTPATIENT)
Dept: ADMINISTRATIVE | Facility: OTHER | Age: 54
End: 2024-12-16

## 2024-12-17 NOTE — TELEPHONE ENCOUNTER
12/16/24 8:43 PM     Chart reviewed for Total Abdominal Hysterectomy was/were submitted to the patient's insurance.     LOULOU TANNER MA   [facility]

## 2025-01-10 NOTE — TELEPHONE ENCOUNTER
Med rec is complete per Patient at bedside and MAR from [facility] [phone].     Allergies reviewed.    Has patient had any outside antibiotics in the last 30 days? Y    Any Anticoagulants (rivaroxaban, apixaban, edoxaban, dabigatran, warfarin, enoxaparin) taken in the last 14 days? N           Pharmacy/Pharmacies Pt utilizes : [facility] [phone].          S/w pt, informed her of below  Will follow up next week

## (undated) DEVICE — SCD SEQUENTIAL COMPRESSION COMFORT SLEEVE MEDIUM KNEE LENGTH: Brand: KENDALL SCD

## (undated) DEVICE — SUT VICRYL 0 CT-1 36 IN J946H

## (undated) DEVICE — GLOVE INDICATOR PI UNDERGLOVE SZ 7 BLUE

## (undated) DEVICE — MEDI-VAC YANKAUER SUCTION HANDLE W/BULBOUS AND CONTROL VENT: Brand: CARDINAL HEALTH

## (undated) DEVICE — INTENDED FOR TISSUE SEPARATION, AND OTHER PROCEDURES THAT REQUIRE A SHARP SURGICAL BLADE TO PUNCTURE OR CUT.: Brand: BARD-PARKER SAFETY BLADES SIZE 15, STERILE

## (undated) DEVICE — LIGACLIP MCA MULTIPLE CLIP APPLIERS, 20 SMALL CLIPS: Brand: LIGACLIP

## (undated) DEVICE — SUT VICRYL 3-0 SH 27 IN J416H

## (undated) DEVICE — PENCIL ELECTROSURG E-Z CLEAN -0035H

## (undated) DEVICE — PACKING VAGINAL 2 IN

## (undated) DEVICE — 3000CC GUARDIAN II: Brand: GUARDIAN

## (undated) DEVICE — 3M™ STERI-STRIP™ REINFORCED ADHESIVE SKIN CLOSURES, R1547, 1/2 IN X 4 IN (12 MM X 100 MM), 6 STRIPS/ENVELOPE: Brand: 3M™ STERI-STRIP™

## (undated) DEVICE — SUT VICRYL 0 CT-1 CR/8 27 IN JJ41G

## (undated) DEVICE — SPONGE LAP 18 X 4 IN

## (undated) DEVICE — 3M™ STERI-STRIP™ COMPOUND BENZOIN TINCTURE 40 BAGS/CARTON 4 CARTONS/CASE C1544: Brand: 3M™ STERI-STRIP™

## (undated) DEVICE — SURGICEL 4 X 8

## (undated) DEVICE — CURITY PLAIN PACKING STRIP: Brand: CURITY

## (undated) DEVICE — Device

## (undated) DEVICE — BETHLEHEM UNIVERSAL MINOR VAG: Brand: CARDINAL HEALTH

## (undated) DEVICE — SYRINGE 50ML LL

## (undated) DEVICE — INTENT TO BE USED WITH SUTURE MATERIAL FOR TISSUE CLOSURE: Brand: RICHARD-ALLAN® NEEDLE 1/2 CIRCLE TAPER

## (undated) DEVICE — PREMIUM DRY TRAY LF: Brand: MEDLINE INDUSTRIES, INC.

## (undated) DEVICE — TUBING SUCTION 5MM X 12 FT

## (undated) DEVICE — SUT VICRYL 4-0 PS-2 27 IN J426H

## (undated) DEVICE — IV EXTENSION TUBING 33 IN

## (undated) DEVICE — SYRINGE 10ML LL

## (undated) DEVICE — NEEDLE COUNTER LG W/RULER

## (undated) DEVICE — SPINAL NEEDLE 22 G X 2 1/2

## (undated) DEVICE — TRAY FOLEY 16FR URIMETER SILICONE SURESTEP

## (undated) DEVICE — GLOVE INDICATOR PI UNDERGLOVE SZ 6.5 BLUE

## (undated) DEVICE — PLUMEPEN PRO 10FT

## (undated) DEVICE — MINOR PROCEDURE DRAPE: Brand: CONVERTORS

## (undated) DEVICE — GLOVE PI ULTRA TOUCH SZ.8.0

## (undated) DEVICE — SUT MONOCRYL 5-0 P-3 18 IN Y493G

## (undated) DEVICE — GLOVE PI ULTRA TOUCH SZ.7.5

## (undated) DEVICE — POOLE SUCTION HANDLE: Brand: CARDINAL HEALTH

## (undated) DEVICE — GLOVE PI ULTRA TOUCH SZ.6.5

## (undated) DEVICE — PACK UNIVERSAL NECK

## (undated) DEVICE — SUTURE CAPTURING DEVICE: Brand: CAPIO SLIM

## (undated) DEVICE — DRAPE SURGIKIT SADDLE BAG

## (undated) DEVICE — 2000CC GUARDIAN II: Brand: GUARDIAN

## (undated) DEVICE — SUT VICRYL 0 CT-1 18 IN J740D